# Patient Record
Sex: FEMALE | Race: BLACK OR AFRICAN AMERICAN | NOT HISPANIC OR LATINO | Employment: FULL TIME | URBAN - METROPOLITAN AREA
[De-identification: names, ages, dates, MRNs, and addresses within clinical notes are randomized per-mention and may not be internally consistent; named-entity substitution may affect disease eponyms.]

---
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
## 2019-12-02 ENCOUNTER — OFFICE VISIT (OUTPATIENT)
Dept: FAMILY MEDICINE | Facility: CLINIC | Age: 19
End: 2019-12-02
Payer: COMMERCIAL

## 2019-12-02 VITALS
TEMPERATURE: 97.9 F | WEIGHT: 165.8 LBS | RESPIRATION RATE: 16 BRPM | SYSTOLIC BLOOD PRESSURE: 136 MMHG | HEART RATE: 68 BPM | BODY MASS INDEX: 25.13 KG/M2 | OXYGEN SATURATION: 100 % | DIASTOLIC BLOOD PRESSURE: 81 MMHG | HEIGHT: 68 IN

## 2019-12-02 DIAGNOSIS — N92.6 IRREGULAR PERIODS: ICD-10-CM

## 2019-12-02 DIAGNOSIS — G43.009 MIGRAINE WITHOUT AURA AND WITHOUT STATUS MIGRAINOSUS, NOT INTRACTABLE: ICD-10-CM

## 2019-12-02 DIAGNOSIS — Z00.00 ROUTINE GENERAL MEDICAL EXAMINATION AT A HEALTH CARE FACILITY: Primary | ICD-10-CM

## 2019-12-02 PROCEDURE — 99213 OFFICE O/P EST LOW 20 MIN: CPT | Mod: 25 | Performed by: FAMILY MEDICINE

## 2019-12-02 PROCEDURE — 90472 IMMUNIZATION ADMIN EACH ADD: CPT | Performed by: FAMILY MEDICINE

## 2019-12-02 PROCEDURE — 58301 REMOVE INTRAUTERINE DEVICE: CPT | Performed by: FAMILY MEDICINE

## 2019-12-02 PROCEDURE — 99385 PREV VISIT NEW AGE 18-39: CPT | Mod: 25 | Performed by: FAMILY MEDICINE

## 2019-12-02 PROCEDURE — 90734 MENACWYD/MENACWYCRM VACC IM: CPT | Performed by: FAMILY MEDICINE

## 2019-12-02 PROCEDURE — 90686 IIV4 VACC NO PRSV 0.5 ML IM: CPT | Performed by: FAMILY MEDICINE

## 2019-12-02 PROCEDURE — 90471 IMMUNIZATION ADMIN: CPT | Performed by: FAMILY MEDICINE

## 2019-12-02 RX ORDER — SUMATRIPTAN 50 MG/1
50-100 TABLET, FILM COATED ORAL
Qty: 18 TABLET | Refills: 3 | Status: SHIPPED | OUTPATIENT
Start: 2019-12-02 | End: 2021-02-26

## 2019-12-02 ASSESSMENT — PATIENT HEALTH QUESTIONNAIRE - PHQ9
SUM OF ALL RESPONSES TO PHQ QUESTIONS 1-9: 10
SUM OF ALL RESPONSES TO PHQ QUESTIONS 1-9: 10
10. IF YOU CHECKED OFF ANY PROBLEMS, HOW DIFFICULT HAVE THESE PROBLEMS MADE IT FOR YOU TO DO YOUR WORK, TAKE CARE OF THINGS AT HOME, OR GET ALONG WITH OTHER PEOPLE: SOMEWHAT DIFFICULT

## 2019-12-02 ASSESSMENT — MIFFLIN-ST. JEOR: SCORE: 1576.91

## 2019-12-02 NOTE — PATIENT INSTRUCTIONS
See your dentist for possible bite guard  Try amitriptyline at night to help avoid daily weekly headaches  Use Sumatriptan for rescue  Avoid regular use of rescue medications  See the headache clinic  Eat and drink water regularly and sleep at least 8-9 hours each night  Use your glasses!          Preventive Health Recommendations  Female Ages 18 to 20     Yearly exam:     See your health care provider every year in order to  o Review health changes.   o Discuss preventive care.    o Review your medicines if your doctor has prescribed any.      You should be tested each year for STDs (sexually transmitted diseases).       After age 20, talk to your provider about how often you should have cholesterol testing.      If you are at risk for diabetes, you should have a diabetes test (fasting glucose).     Shots:     Get a flu shot each year.     Get a tetanus shot every 10 years.     Consider getting the shot (vaccine) that prevents cervical cancer (Gardasil).    Nutrition:     Eat at least 5 servings of fruits and vegetables each day.    Eat whole-grain bread, whole-wheat pasta and brown rice instead of white grains and rice.    Get adequate Calcium and Vitamin D.     Lifestyle    Exercise at least 150 minutes a week each week (30 minutes a day, 5 days a week). This will help you control your weight and prevent disease.    No smoking.     Wear sunscreen to prevent skin cancer.    See your dentist every six months for an exam and cleaning.

## 2019-12-02 NOTE — PROGRESS NOTES
SUBJECTIVE:   CC: Maria Alejandra Betts is an 19 year old woman who presents for preventive health visit.     Healthy Habits:     Getting at least 3 servings of Calcium per day:  Yes    Bi-annual eye exam:  Yes    Dental care twice a year:  Yes    Sleep apnea or symptoms of sleep apnea:  None    Diet:  Regular (no restrictions)    Frequency of exercise:  2-3 days/week    Taking medications regularly:  Not Applicable    Barriers to taking medications:  Not applicable    Medication side effects:  Not applicable    PHQ-2 Total Score: 4    Additional concerns today:  No     Answers for HPI/ROS submitted by the patient on 12/2/2019   Annual Exam:  If you checked off any problems, how difficult have these problems made it for you to do your work, take care of things at home, or get along with other people?: Somewhat difficult  PHQ9 TOTAL SCORE: 10          (Z00.00) Routine general medical examination at a health care facility  (primary encounter diagnosis)  Comment:   Plan:     (G43.009) Migraine without aura and without status migrainosus, not intractable  Comment: ongoing headaches dissiculty with near daily ones  Taking a lot of nsaids  Is aware of some triggers  Does feel there is some grinding clenching causing some headaches  Plan: NEUROLOGY ADULT REFERRAL, SUMAtriptan (IMITREX)        50 MG tablet, amitriptyline (ELAVIL) 25 MG         tablet            (N92.6) Irregular periods  Comment: would like IUD removed  Plan: REMOVE INTRAUTERINE DEVICE        Plans to use condoms for now       Today's PHQ-2 Score:   PHQ-2 ( 1999 Pfizer) 12/2/2019   Q1: Little interest or pleasure in doing things 2   Q2: Feeling down, depressed or hopeless 2   PHQ-2 Score 4   Q1: Little interest or pleasure in doing things More than half the days   Q2: Feeling down, depressed or hopeless More than half the days   PHQ-2 Score 4       Abuse: Current or Past(Physical, Sexual or Emotional)- No  Do you feel safe in your environment? Yes        Social  History     Tobacco Use     Smoking status: Not on file   Substance Use Topics     Alcohol use: Not on file     If you drink alcohol do you typically have >3 drinks per day or >7 drinks per week? No    Alcohol Use 12/2/2019   Prescreen: >3 drinks/day or >7 drinks/week? No   No flowsheet data found.    Reviewed orders with patient.  Reviewed health maintenance and updated orders accordingly - Yes  Lab work is in process  Labs reviewed in EPIC  There is no problem list on file for this patient.    No past surgical history on file.    Social History     Tobacco Use     Smoking status: Never Smoker     Smokeless tobacco: Never Used   Substance Use Topics     Alcohol use: Not on file     Family History   Problem Relation Age of Onset     Allergies Mother      Back Pain Mother      Asthma Mother      Heart block Maternal Grandmother      Heart Disease Maternal Grandmother      Allergies Maternal Grandmother      Asthma Maternal Grandmother      Heart Disease Paternal Grandmother      Diabetes Paternal Grandmother      Hypertension Paternal Grandmother      Allergies Sister          Current Outpatient Medications   Medication Sig Dispense Refill     amitriptyline (ELAVIL) 25 MG tablet Take 1 tablet (25 mg) by mouth At Bedtime For avoiding clenching and headache prevention (Patient not taking: Reported on 12/4/2019) 30 tablet 1     SUMAtriptan (IMITREX) 50 MG tablet Take 1-2 tablets ( mg) by mouth at onset of headache for migraine May repeat in 2 hours. Max 4 tablets/24 hours. 18 tablet 3       Mammogram not appropriate for this patient based on age.    Pertinent mammograms are reviewed under the imaging tab.  History of abnormal Pap smear: NO - age 21-29 PAP every 3 years recommended     Reviewed and updated as needed this visit by clinical staff         Reviewed and updated as needed this visit by Provider        No past medical history on file.   No past surgical history on file.    Review of  "Systems  CONSTITUTIONAL: NEGATIVE for fever, chills, change in weight  INTEGUMENTARU/SKIN: NEGATIVE for worrisome rashes, moles or lesions  EYES: NEGATIVE for vision changes or irritation  ENT: NEGATIVE for ear, mouth and throat problems  RESP: NEGATIVE for significant cough or SOB  BREAST: NEGATIVE for masses, tenderness or discharge  CV: NEGATIVE for chest pain, palpitations or peripheral edema  GI: NEGATIVE for nausea, abdominal pain, heartburn, or change in bowel habits  : NEGATIVE for unusual urinary or vaginal symptoms. Periods are regular.  MUSCULOSKELETAL: NEGATIVE for significant arthralgias or myalgia  NEURO: NEGATIVE for weakness, dizziness or paresthesias  PSYCHIATRIC: NEGATIVE for changes in mood or affect     OBJECTIVE:   /81   Pulse 68   Temp 97.9  F (36.6  C) (Oral)   Resp 16   Ht 1.737 m (5' 8.4\")   Wt 75.2 kg (165 lb 12.8 oz)   LMP 11/27/2019   SpO2 100%   BMI 24.92 kg/m    Physical Exam  GENERAL: healthy, alert and no distress  EYES: Eyes grossly normal to inspection, PERRL and conjunctivae and sclerae normal  HENT: ear canals and TM's normal, nose and mouth without ulcers or lesions  NECK: no adenopathy, no asymmetry, masses, or scars and thyroid normal to palpation  RESP: lungs clear to auscultation - no rales, rhonchi or wheezes  CV: regular rate and rhythm, normal S1 S2, no S3 or S4, no murmur, click or rub, no peripheral edema and peripheral pulses strong  ABDOMEN: soft, nontender, no hepatosplenomegaly, no masses and bowel sounds normal  MS: no gross musculoskeletal defects noted, no edema  SKIN: no suspicious lesions or rashes  NEURO: Normal strength and tone, mentation intact and speech normal  PSYCH: mentation appears normal, affect normal/bright  Diagnostic Test Results:  Labs reviewed in Epic    ASSESSMENT/PLAN:   1. Routine general medical examination at a health care facility   See your dentist for possible bite guard  Try amitriptyline at night to help avoid daily " "weekly headaches  Use Sumatriptan for rescue  Avoid regular use of rescue medications  See the headache clinic  Eat and drink water regularly and sleep at least 8-9 hours each night  Use your glasses!      2. Migraine without aura and without status migrainosus, not intractable    - NEUROLOGY ADULT REFERRAL  - SUMAtriptan (IMITREX) 50 MG tablet; Take 1-2 tablets ( mg) by mouth at onset of headache for migraine May repeat in 2 hours. Max 4 tablets/24 hours.  Dispense: 18 tablet; Refill: 3  - amitriptyline (ELAVIL) 25 MG tablet; Take 1 tablet (25 mg) by mouth At Bedtime For avoiding clenching and headache prevention (Patient not taking: Reported on 12/4/2019)  Dispense: 30 tablet; Refill: 1    3. Irregular periods    - REMOVE INTRAUTERINE DEVICE    COUNSELING:  Reviewed preventive health counseling, as reflected in patient instructions       Regular exercise       Healthy diet/nutrition       Safe sex practices/STD prevention    Estimated body mass index is 20.67 kg/m  as calculated from the following:    Height as of 4/27/17: 1.753 m (5' 9\").    Weight as of 4/27/17: 63.5 kg (140 lb).         has no history on file for tobacco.      Counseling Resources:  ATP IV Guidelines  Pooled Cohorts Equation Calculator  Breast Cancer Risk Calculator  FRAX Risk Assessment  ICSI Preventive Guidelines  Dietary Guidelines for Americans, 2010  USDA's MyPlate  ASA Prophylaxis  Lung CA Screening    Kenzie Walker MD  [facility]  "

## 2019-12-02 NOTE — Clinical Note
I asked her to call back to review her symptoms since her last visitPlease let me know if she calls backSavanna

## 2019-12-02 NOTE — NURSING NOTE
"Chief Complaint   Patient presents with     Physical     /81   Pulse 68   Temp 97.9  F (36.6  C) (Oral)   Resp 16   Ht 1.737 m (5' 8.4\")   Wt 75.2 kg (165 lb 12.8 oz)   LMP 11/27/2019   SpO2 100%   BMI 24.92 kg/m   Estimated body mass index is 24.92 kg/m  as calculated from the following:    Height as of this encounter: 1.737 m (5' 8.4\").    Weight as of this encounter: 75.2 kg (165 lb 12.8 oz).  bp completed using cuff size: regular      Health Maintenance addressed:  NONE    n/a    Cassi Piedra MA    "

## 2019-12-04 ENCOUNTER — OFFICE VISIT (OUTPATIENT)
Dept: FAMILY MEDICINE | Facility: CLINIC | Age: 19
End: 2019-12-04
Payer: COMMERCIAL

## 2019-12-04 VITALS — SYSTOLIC BLOOD PRESSURE: 127 MMHG | DIASTOLIC BLOOD PRESSURE: 81 MMHG | TEMPERATURE: 98.9 F

## 2019-12-04 DIAGNOSIS — Z71.84 TRAVEL ADVICE ENCOUNTER: Primary | ICD-10-CM

## 2019-12-04 PROCEDURE — 90472 IMMUNIZATION ADMIN EACH ADD: CPT | Mod: GA | Performed by: NURSE PRACTITIONER

## 2019-12-04 PROCEDURE — 90471 IMMUNIZATION ADMIN: CPT | Mod: GA | Performed by: NURSE PRACTITIONER

## 2019-12-04 PROCEDURE — 90691 TYPHOID VACCINE IM: CPT | Mod: GA | Performed by: NURSE PRACTITIONER

## 2019-12-04 PROCEDURE — 90715 TDAP VACCINE 7 YRS/> IM: CPT | Mod: GA | Performed by: NURSE PRACTITIONER

## 2019-12-04 PROCEDURE — 99402 PREV MED CNSL INDIV APPRX 30: CPT | Mod: 25 | Performed by: NURSE PRACTITIONER

## 2019-12-04 RX ORDER — AZITHROMYCIN 500 MG/1
500 TABLET, FILM COATED ORAL DAILY
Qty: 3 TABLET | Refills: 0 | Status: SHIPPED | OUTPATIENT
Start: 2019-12-04 | End: 2019-12-07

## 2019-12-04 NOTE — NURSING NOTE
"Chief Complaint   Patient presents with     [facility]     initial /81 (BP Location: Left arm, Cuff Size: Adult Regular)   Temp 98.9  F (37.2  C) (Oral)   LMP 11/27/2019  Estimated body mass index is 24.92 kg/m  as calculated from the following:    Height as of 12/2/19: 1.737 m (5' 8.4\").    Weight as of 12/2/19: 75.2 kg (165 lb 12.8 oz).  BP completed using cuff size: regular.  L   arm      Health Maintenance that is potentially due pending provider review:  NONE    n/a    Miles Hoyos ma  "

## 2019-12-04 NOTE — PATIENT INSTRUCTIONS
Today December 4, 2019 you received the    Tetanus (Tdap) Vaccine    Typhoid - injectable. This vaccine is valid for two years.   .    These appointments can be made as a NURSE ONLY visit.    **It is very important for the vaccinations to be given on the scheduled day(s), this helps ensure you receive the full effectiveness of the vaccine.**    Please call [facility] with any questions [phone]    Thank you for visiting [facility]

## 2019-12-04 NOTE — PROGRESS NOTES
Nurse Note      Itinerary:  [place]      Departure Date: 1/10/20      Return Date: 1/25/20      Length of Trip 2 weeks      Reason for Travel: Study abroad           Urban or rural: both      Accommodations: Private dwelling        IMMUNIZATION HISTORY  Have you received any immunizations within the past 4 weeks?  No  Have you ever fainted from having your blood drawn or from an injection?  No  Have you ever had a fever reaction to vaccination?  No  Have you ever had any bad reaction or side effect from any vaccination?  No  Have you ever had hepatitis A or B vaccine?  Yes  Do you live (or work closely) with anyone who has AIDS, an AIDS-like condition, any other immune disorder or who is on chemotherapy for cancer?  No  Do you have a family history of immunodeficiency?  No  Have you received any injection of immune globulin or any blood products during the past 12 months?  No    Patient roomed by Miles Pop  Maria Alejandra Betts is a 19 year old female seen today with friends  for counsultation for international travel to the stated countries.   Patient will be departing in  5weeks    Patient itinerary :  will be in the [facility] [place] which presents risk for Dengue Fever and food borne illnesses. exposure.      Patient's activities will include vision trip with contact with animals.    Patient's country of birth is [place]    Special medical concerns: migrains  Pre-travel questionnaire was completed by patient and reviewed by provider.     Vitals: /81 (BP Location: Left arm, Cuff Size: Adult Regular)   Temp 98.9  F (37.2  C) (Oral)   LMP 11/27/2019   BMI= There is no height or weight on file to calculate BMI.    EXAM:  General:  Well-nourished, well-developed in no acute distress.  Appears to be stated age, interacts appropriately and expresses understanding of information given to patient.    Current Outpatient Medications   Medication Sig Dispense Refill     SUMAtriptan  (IMITREX) 50 MG tablet Take 1-2 tablets ( mg) by mouth at onset of headache for migraine May repeat in 2 hours. Max 4 tablets/24 hours. 18 tablet 3     amitriptyline (ELAVIL) 25 MG tablet Take 1 tablet (25 mg) by mouth At Bedtime For avoiding clenching and headache prevention (Patient not taking: Reported on 12/4/2019) 30 tablet 1     There is no problem list on file for this patient.    Allergies   Allergen Reactions     Seasonal Allergies          Immunizations discussed include:   Hepatitis A:  Up to date  Hepatitis B: Up to date  Influenza: Up to date  Typhoid: Ordered/given today, risks, benefits and side effects reviewed  Rabies: Declined  Not concerned about risk of disease  Patient states that school contacted the facility and they state that animals are vaccinated  Yellow Fever: Not indicated  Japanese Encephalitis: Not indicated  Meningococcus: Up to date  Tetanus/Diphtheria: Ordered/given today, risks, benefits and side effects reviewed  Measles/Mumps/Rubella: Up to date  Cholera: Not needed  Polio: Up to date  Pneumococcal: Under age of 65  Varicella: Up to date  Zostavax:  Not indicated  Shingrix: Not indicated  HPV:  deferred  TB:  na    Altitude Exposure on this trip: no  Past tolerance to Altitude: na    ASSESSMENT/PLAN:    ICD-10-CM    1. Travel advice encounter Z71.84 azithromycin (ZITHROMAX) 500 MG tablet     I have reviewed general recommendations for safe travel   including: food/water precautions, insect precautions, safer sex   practices given high prevalence of Zika, HIV and other STDs,   roadway safety. Educational materials and [employer] report provided.    Malaraia prophylaxis recommended: none  Symptomatic treatment for traveler's diarrhea: azithromycin  Altitude illness prevention and treatment: na      Evacuation insurance advised and resources were provided to patient.    Total visit time 30 minutes  with over 50% of time spent counseling patient as detailed above.    Luz Maria Chilel  CNP

## 2019-12-12 ENCOUNTER — TELEPHONE (OUTPATIENT)
Dept: FAMILY MEDICINE | Facility: CLINIC | Age: 19
End: 2019-12-12

## 2019-12-12 NOTE — TELEPHONE ENCOUNTER
Message from SN in CC'd chart:    Kenzie Walker MD  [facility] T[facility]             I asked her to call back to review her symptoms since her last visit     Please let me know if she calls back   Thanks     SN

## 2021-03-26 ENCOUNTER — OFFICE VISIT (OUTPATIENT)
Dept: URGENT CARE | Facility: URGENT CARE | Age: 21
End: 2021-03-26
Payer: COMMERCIAL

## 2021-03-26 ENCOUNTER — TELEPHONE (OUTPATIENT)
Dept: FAMILY MEDICINE | Facility: CLINIC | Age: 21
End: 2021-03-26

## 2021-03-26 VITALS
DIASTOLIC BLOOD PRESSURE: 79 MMHG | WEIGHT: 160 LBS | BODY MASS INDEX: 24.04 KG/M2 | OXYGEN SATURATION: 100 % | HEART RATE: 73 BPM | SYSTOLIC BLOOD PRESSURE: 118 MMHG | TEMPERATURE: 97.7 F

## 2021-03-26 DIAGNOSIS — H60.501 ACUTE OTITIS EXTERNA OF RIGHT EAR, UNSPECIFIED TYPE: Primary | ICD-10-CM

## 2021-03-26 PROCEDURE — 99213 OFFICE O/P EST LOW 20 MIN: CPT | Performed by: FAMILY MEDICINE

## 2021-03-26 RX ORDER — CITALOPRAM HYDROBROMIDE 20 MG/1
TABLET ORAL
COMMUNITY
Start: 2020-12-28 | End: 2021-11-05

## 2021-03-26 RX ORDER — CIPROFLOXACIN AND DEXAMETHASONE 3; 1 MG/ML; MG/ML
4 SUSPENSION/ DROPS AURICULAR (OTIC) 2 TIMES DAILY
Qty: 2.8 ML | Refills: 0 | Status: SHIPPED | OUTPATIENT
Start: 2021-03-26 | End: 2021-04-02

## 2021-03-26 NOTE — TELEPHONE ENCOUNTER
Patient states she has intermittent tingly sensation to right ear  Had concussion to right side of head a couple years ago   Can hear out of ear just fine   Uses Qtips in ears - tried to clean ears last night - that didn't help   No respiratory s/s  No fever   Has headache as well   Offered appt - pt works until 6pm  Advised TI CARMICHAEL RN

## 2021-03-27 NOTE — PATIENT INSTRUCTIONS
Patient Education     External Ear Infection (Adult)    External otitis (also called  swimmer s ear ) is an infection in the ear canal. It's often caused by bacteria or fungus. It can occur a few days after water gets trapped in the ear canal (from swimming or bathing). It can also occur after cleaning too deeply in the ear canal with a cotton swab or other object. Sometimes, hair care products get into the ear canal and cause this problem.   Symptoms can include pain, fever, itching, redness, drainage, or swelling of the ear canal. Temporary hearing loss may also occur.   Home care    Don't try to clean the ear canal. This can push pus and bacteria deeper into the canal.    Use prescribed ear drops as directed. These help reduce swelling and fight the infection. If an ear wick was placed in the ear canal, apply drops right onto the end of the wick. The wick will draw the medicine into the ear canal even if it's swollen closed.    A cotton ball may be loosely placed in the outer ear to absorb any drainage.    You may use over-the-counter medicines to control pain as directed by the healthcare provider, unless another medicine was prescribed. Talk with your provider before using these medicines if you have chronic liver or kidney disease or ever had a stomach ulcer or digestive tract bleeding.    Don't allow water to get into your ear when bathing. Also don't swim until the infection has cleared.    Prevention    Keep your ears dry. This helps lower the risk of infection. Dry your ears with a towel or hair dryer after getting wet. Also, use ear plugs when swimming.    Don't stick any objects in the ear to remove wax.    Talk with your provider about using ear drops to prevent swimmer's ear in case you feel water trapped in your ear canal. You can get these drops over the counter at most drugstores. They work by removing water from the ear canal.    Follow-up care  Follow up with your healthcare provider in 1 week,  or as advised.   When to seek medical advice  Call your healthcare provider right away if any of these occur:     Ear pain becomes worse or doesn t improve after 3 days of treatment    Redness or swelling of the outer ear occurs or gets worse    Headache    Fever of 100.4 F (38 C) or higher, or as directed by your healthcare provider  Call 911  Call 911 or get immediate medical care if any of the following occur:     Seizure    Unusual drowsiness or confusion    Unusual painful or stiff neck    Radha last reviewed this educational content on 8/1/2020 2000-2020 [employer]. All rights reserved. This information is not intended as a substitute for professional medical care. Always follow your healthcare professional's instructions.

## 2021-03-28 NOTE — PROGRESS NOTES
SUBJECTIVE:  Maria Alejandra Betts, a 21 year old female scheduled an appointment to discuss the following issues:  Acute otitis externa of right ear, unspecified type    Medical, social, surgical, and family histories reviewed.     Urgent Care   Ear Problem (c/o ear pain for 1 day)  Pt felt right ear tingling and irritated for the last 1-2 days.  No drainage or actual pain.  Mild headache.  No hx of ear infection.  No eye or sinus symptoms.  Slight runny nose this week.  No sore throat.  No COVID-19 exposure; no loss of sense of smell or taste.     ROS:  See HPI.  No nausea/vomiting.  No fever/chills.  No chest pain/SOB.  No BM/urine problems.  Mild lightheadedness but no syncope.      OBJECTIVE:  /79   Pulse 73   Temp 97.7  F (36.5  C) (Tympanic)   Wt 72.6 kg (160 lb)   LMP 03/12/2021   SpO2 100%   BMI 24.04 kg/m    EXAM:  GENERAL APPEARANCE: alert and midl distress; afebrile; no cyanosis or accessory muscle use, no meningeal signs  EYES: Eyes grossly normal to inspection, PERRL and conjunctivae and sclerae normal  HENT: left ear canal and TM normal; right ear canal showed waxy debris and erythema at inferior aspect of the canal wall; normal right TM; nose and mouth without ulcers or lesions; no sinus or mastoid tenderness  NECK: no adenopathy, no asymmetry, masses, or scars and neck normal to palpation  RESP: lungs clear to auscultation - no rales, rhonchi or wheezes  CV: regular rates and rhythm, normal S1 S2, no S3 or S4 and no murmur, click or rub  LYMPHATICS: no cervical, preauricular or post auricular adenopathy  MS: extremities normal- no gross deformities noted  SKIN: no suspicious lesions or rashes  NEURO: Normal strength and tone, mentation intact and speech normal      ASSESSMENT/PLAN:  (H60.501) Acute otitis externa of right ear, unspecified type  (primary encounter diagnosis)  Comment: right ear irrigation attempted but pt did not tolerate it; small amount of wet waxy debris removed with curette;  no bleeding  Plan: ciprofloxacin-dexamethasone (CIPRODEX) 0.3-0.1         % otic suspension  Care instructions given; avoid Q-tips.     Pt to f/up PCP within 1 week if no improvement or worsening.  Warning signs and symptoms explained.

## 2021-07-08 NOTE — PROGRESS NOTES
SUBJECTIVE:   CC: Maria Alejandra Betts is an 21 year old woman who presents for preventive health visit.       Patient has been advised of split billing requirements and indicates understanding: Yes  Healthy Habits:     Getting at least 3 servings of Calcium per day:  NO    Bi-annual eye exam:  NO    Dental care twice a year:  Yes    Sleep apnea or symptoms of sleep apnea:  None    Diet:  Regular (no restrictions)    Frequency of exercise:  None    Taking medications regularly:  Yes    Medication side effects:  None    PHQ-2 Total Score: 0    Additional concerns today:  No      (Z00.00) Routine general medical examination at a health care facility  (primary encounter diagnosis)  Comment:   Plan: NEISSERIA GONORRHOEA PCR, CHLAMYDIA TRACHOMATIS        PCR            (G43.009) Migraine without aura and without status migrainosus, not intractable  Comment:   Plan: SUMAtriptan (IMITREX) 50 MG tablet            (Z12.4) Cervical cancer screening  Comment:   Plan: CANCELED: Pap imaged thin layer screen with HPV        - recommended age 30 - 65 years (select HPV         order below)            (Z12.4) Screening for cervical cancer  Comment:   Plan: Pap imaged thin layer screen only - recommended        age 21 - 24 years            (G43.109) Migraine with aura and without status migrainosus, not intractable  Comment:   Plan:     (R55) Syncope, unspecified syncope type  Comment:   Plan: EKG 12-lead complete w/read - Clinics, TSH with        free T4 reflex, Comprehensive metabolic panel         (BMP + Alb, Alk Phos, ALT, AST, Total. Bili,         TP), CBC with platelets               Answers for HPI/ROS submitted by the patient on 7/12/2021  If you checked off any problems, how difficult have these problems made it for you to do your work, take care of things at home, or get along with other people?: Not difficult at all  PHQ9 TOTAL SCORE: 1        Today's PHQ-2 Score:   PHQ-2 ( 1999 Pf[employer]) 12/2/2019   Q1: Little interest or  pleasure in doing things 2   Q2: Feeling down, depressed or hopeless 2   PHQ-2 Score 4   Q1: Little interest or pleasure in doing things More than half the days   Q2: Feeling down, depressed or hopeless More than half the days   PHQ-2 Score 4       Abuse: Current or Past (Physical, Sexual or Emotional) - No  Do you feel safe in your environment? Yes    Have you ever done Advance Care Planning? (For example, a Health Directive, POLST, or a discussion with a medical provider or your loved ones about your wishes): No, advance care planning information given to patient to review.  Advanced care planning was discussed at today's visit.    Social History     Tobacco Use     Smoking status: Never Smoker     Smokeless tobacco: Never Used   Substance Use Topics     Alcohol use: Not on file     If you drink alcohol do you typically have >3 drinks per day or >7 drinks per week? No    Alcohol Use 12/2/2019   Prescreen: >3 drinks/day or >7 drinks/week? No   Prescreen: >3 drinks/day or >7 drinks/week? -   No flowsheet data found.    Reviewed orders with patient.  Reviewed health maintenance and updated orders accordingly - Yes  Lab work is in process    Breast Cancer Screening:        History of abnormal Pap smear: NO - age 21-29 PAP every 3 years recommended     Reviewed and updated as needed this visit by clinical staff                 Reviewed and updated as needed this visit by Provider                No past medical history on file.   No past surgical history on file.    Review of Systems   Constitutional: Negative for chills and fever.   HENT: Negative for congestion, ear pain, hearing loss and sore throat.    Eyes: Negative for pain and visual disturbance.   Respiratory: Negative for cough and shortness of breath.    Cardiovascular: Negative for chest pain, palpitations and peripheral edema.   Gastrointestinal: Negative for abdominal pain, constipation, diarrhea, heartburn, hematochezia and nausea.   Genitourinary:  Negative for dysuria, frequency, genital sores, hematuria and urgency.   Musculoskeletal: Negative for arthralgias, joint swelling and myalgias.   Skin: Negative for rash.   Neurological: Positive for headaches. Negative for dizziness, weakness and paresthesias.   Psychiatric/Behavioral: Negative for mood changes. The patient is not nervous/anxious.           OBJECTIVE:   There were no vitals taken for this visit.  Physical Exam  GENERAL: healthy, alert and no distress  EYES: Eyes grossly normal to inspection, PERRL and conjunctivae and sclerae normal  HENT: ear canals and TM's normal, nose and mouth without ulcers or lesions  NECK: no adenopathy, no asymmetry, masses, or scars and thyroid normal to palpation  RESP: lungs clear to auscultation - no rales, rhonchi or wheezes  BREAST: normal without masses, tenderness or nipple discharge and no palpable axillary masses or adenopathy  CV: regular rate and rhythm, normal S1 S2, no S3 or S4, no murmur, click or rub, no peripheral edema and peripheral pulses strong  ABDOMEN: soft, nontender, no hepatosplenomegaly, no masses and bowel sounds normal  MS: no gross musculoskeletal defects noted, no edema  SKIN: no suspicious lesions or rashes  NEURO: Normal strength and tone, mentation intact and speech normal  PSYCH: mentation appears normal, affect normal/bright    Diagnostic Test Results:  Labs reviewed in Epic    ASSESSMENT/PLAN:   1. Routine general medical examination at a health care facility  See avs  - NEISSERIA GONORRHOEA PCR  - CHLAMYDIA TRACHOMATIS PCR    2. Migraine without aura and without status migrainosus, not intractable  reviewed triggers and control measures  discussed meds for rescue and keeping headache diary  - SUMAtriptan (IMITREX) 50 MG tablet; TAKE 1-2 TABLETS BY MOUTH AT ONSET OF HEADACHE FOR MIGRAINE MAY REPEAT IN 2 HOURS. MAX 4 TABS/24 HOURS.  Dispense: 18 tablet; Refill: 11    3. Screening for cervical cancer    - Pap imaged thin layer screen  "only - recommended age 21 - 24 years    4. Syncope, unspecified syncope type   EKG showed no prolonged QT or P wave abnormality  Additional eval as noted below  ECHO ordered  - EKG 12-lead complete w/read - Clinics  - TSH with free T4 reflex; Future  - Comprehensive metabolic panel (BMP + Alb, Alk Phos, ALT, AST, Total. Bili, TP); Future  - CBC with platelets; Future  - CBC with platelets  - Comprehensive metabolic panel (BMP + Alb, Alk Phos, ALT, AST, Total. Bili, TP)  - TSH with free T4 reflex    Patient has been advised of split billing requirements and indicates understanding: Yes  COUNSELING:  Reviewed preventive health counseling, as reflected in patient instructions       Regular exercise       Healthy diet/nutrition    Estimated body mass index is 24.04 kg/m  as calculated from the following:    Height as of 12/2/19: 1.737 m (5' 8.4\").    Weight as of 3/26/21: 72.6 kg (160 lb).  In addition to the preventive visit, 25 minutes was spent face to face with (>50%) counseling and/or coordination of care regarding addition concerns and symptoms.      She reports that she has never smoked. She has never used smokeless tobacco.      Counseling Resources:  ATP IV Guidelines  Pooled Cohorts Equation Calculator  Breast Cancer Risk Calculator  BRCA-Related Cancer Risk Assessment: FHS-7 Tool  FRAX Risk Assessment  ICSI Preventive Guidelines  Dietary Guidelines for Americans, 2010  USDA's MyPlate  ASA Prophylaxis  Lung CA Screening    Kenzie Walker MD  [facility]  "

## 2021-07-12 ENCOUNTER — OFFICE VISIT (OUTPATIENT)
Dept: FAMILY MEDICINE | Facility: CLINIC | Age: 21
End: 2021-07-12
Payer: COMMERCIAL

## 2021-07-12 VITALS
HEIGHT: 68 IN | TEMPERATURE: 97.3 F | SYSTOLIC BLOOD PRESSURE: 127 MMHG | DIASTOLIC BLOOD PRESSURE: 79 MMHG | RESPIRATION RATE: 16 BRPM | HEART RATE: 69 BPM | BODY MASS INDEX: 23.19 KG/M2 | OXYGEN SATURATION: 100 % | WEIGHT: 153 LBS

## 2021-07-12 DIAGNOSIS — Z12.4 CERVICAL CANCER SCREENING: ICD-10-CM

## 2021-07-12 DIAGNOSIS — R55 SYNCOPE, UNSPECIFIED SYNCOPE TYPE: ICD-10-CM

## 2021-07-12 DIAGNOSIS — G43.009 MIGRAINE WITHOUT AURA AND WITHOUT STATUS MIGRAINOSUS, NOT INTRACTABLE: ICD-10-CM

## 2021-07-12 DIAGNOSIS — Z00.00 ROUTINE GENERAL MEDICAL EXAMINATION AT A HEALTH CARE FACILITY: Primary | ICD-10-CM

## 2021-07-12 PROBLEM — G43.109 MIGRAINE WITH AURA AND WITHOUT STATUS MIGRAINOSUS, NOT INTRACTABLE: Status: ACTIVE | Noted: 2021-07-12

## 2021-07-12 LAB
ALBUMIN SERPL-MCNC: 4 G/DL (ref 3.4–5)
ALP SERPL-CCNC: 89 U/L (ref 40–150)
ALT SERPL W P-5'-P-CCNC: 34 U/L (ref 0–50)
ANION GAP SERPL CALCULATED.3IONS-SCNC: 3 MMOL/L (ref 3–14)
AST SERPL W P-5'-P-CCNC: 20 U/L (ref 0–45)
BILIRUB SERPL-MCNC: 0.3 MG/DL (ref 0.2–1.3)
BUN SERPL-MCNC: 9 MG/DL (ref 7–30)
CALCIUM SERPL-MCNC: 8.7 MG/DL (ref 8.5–10.1)
CHLORIDE BLD-SCNC: 108 MMOL/L (ref 94–109)
CO2 SERPL-SCNC: 27 MMOL/L (ref 20–32)
CREAT SERPL-MCNC: 0.76 MG/DL (ref 0.52–1.04)
ERYTHROCYTE [DISTWIDTH] IN BLOOD BY AUTOMATED COUNT: 13.6 % (ref 10–15)
GFR SERPL CREATININE-BSD FRML MDRD: >90 ML/MIN/1.73M2
GLUCOSE BLD-MCNC: 85 MG/DL (ref 70–99)
HCT VFR BLD AUTO: 42.1 % (ref 35–47)
HGB BLD-MCNC: 13.3 G/DL (ref 11.7–15.7)
MCH RBC QN AUTO: 27.9 PG (ref 26.5–33)
MCHC RBC AUTO-ENTMCNC: 31.6 G/DL (ref 31.5–36.5)
MCV RBC AUTO: 88 FL (ref 78–100)
PLATELET # BLD AUTO: 360 10E3/UL (ref 150–450)
POTASSIUM BLD-SCNC: 4.3 MMOL/L (ref 3.4–5.3)
PROT SERPL-MCNC: 7.2 G/DL (ref 6.8–8.8)
RBC # BLD AUTO: 4.77 10E6/UL (ref 3.8–5.2)
SODIUM SERPL-SCNC: 138 MMOL/L (ref 133–144)
TSH SERPL DL<=0.005 MIU/L-ACNC: 0.42 MU/L (ref 0.4–4)
WBC # BLD AUTO: 5.4 10E3/UL (ref 4–11)

## 2021-07-12 PROCEDURE — G0145 SCR C/V CYTO,THINLAYER,RESCR: HCPCS | Performed by: FAMILY MEDICINE

## 2021-07-12 PROCEDURE — 87491 CHLMYD TRACH DNA AMP PROBE: CPT | Performed by: FAMILY MEDICINE

## 2021-07-12 PROCEDURE — 36415 COLL VENOUS BLD VENIPUNCTURE: CPT | Performed by: FAMILY MEDICINE

## 2021-07-12 PROCEDURE — 84443 ASSAY THYROID STIM HORMONE: CPT | Performed by: FAMILY MEDICINE

## 2021-07-12 PROCEDURE — 93000 ELECTROCARDIOGRAM COMPLETE: CPT | Performed by: FAMILY MEDICINE

## 2021-07-12 PROCEDURE — 99214 OFFICE O/P EST MOD 30 MIN: CPT | Mod: 25 | Performed by: FAMILY MEDICINE

## 2021-07-12 PROCEDURE — 87591 N.GONORRHOEAE DNA AMP PROB: CPT | Performed by: FAMILY MEDICINE

## 2021-07-12 PROCEDURE — 85027 COMPLETE CBC AUTOMATED: CPT | Performed by: FAMILY MEDICINE

## 2021-07-12 PROCEDURE — 99395 PREV VISIT EST AGE 18-39: CPT | Performed by: FAMILY MEDICINE

## 2021-07-12 PROCEDURE — 80053 COMPREHEN METABOLIC PANEL: CPT | Performed by: FAMILY MEDICINE

## 2021-07-12 RX ORDER — SUMATRIPTAN 50 MG/1
TABLET, FILM COATED ORAL
Qty: 18 TABLET | Refills: 11 | Status: SHIPPED | OUTPATIENT
Start: 2021-07-12 | End: 2023-01-26

## 2021-07-12 ASSESSMENT — ENCOUNTER SYMPTOMS
CONSTIPATION: 0
PALPITATIONS: 0
NAUSEA: 0
DIZZINESS: 0
SORE THROAT: 0
HEARTBURN: 0
JOINT SWELLING: 0
PARESTHESIAS: 0
NERVOUS/ANXIOUS: 0
FREQUENCY: 0
FEVER: 0
EYE PAIN: 0
ARTHRALGIAS: 0
COUGH: 0
MYALGIAS: 0
ABDOMINAL PAIN: 0
CHILLS: 0
WEAKNESS: 0
DIARRHEA: 0
SHORTNESS OF BREATH: 0
HEADACHES: 1
HEMATURIA: 0
HEMATOCHEZIA: 0
DYSURIA: 0

## 2021-07-12 ASSESSMENT — PATIENT HEALTH QUESTIONNAIRE - PHQ9
SUM OF ALL RESPONSES TO PHQ QUESTIONS 1-9: 1
10. IF YOU CHECKED OFF ANY PROBLEMS, HOW DIFFICULT HAVE THESE PROBLEMS MADE IT FOR YOU TO DO YOUR WORK, TAKE CARE OF THINGS AT HOME, OR GET ALONG WITH OTHER PEOPLE: NOT DIFFICULT AT ALL
SUM OF ALL RESPONSES TO PHQ QUESTIONS 1-9: 1

## 2021-07-12 ASSESSMENT — MIFFLIN-ST. JEOR: SCORE: 1502.5

## 2021-07-12 NOTE — LETTER
July 20, 2021      Maria Alejandra Betts  [address] [place] [id number]        Dear ,    We are writing to inform you of your test results.    Your test results fall within the expected range(s) or remain unchanged from previous results.  Please continue with current treatment plan.    Resulted Orders   NEISSERIA GONORRHOEA PCR   Result Value Ref Range    Neisseria gonorrhoeae Negative Negative      Comment:      Negative for N. gonorrhoeae rRNA by transcription mediated amplification. A negative result by transcription mediated amplification does not preclude the presence of C. trachomatis infection because results are dependent on proper and adequate collection, absence of inhibitors and sufficient rRNA to be detected.    Narrative    The method performance specifications of this test have not been established or approved by the FDA for this specimen type. The test result must be integrated into clinical context for interpretation. This lab is regulated under CLIA as qualified to perform high-complexity clinical testing.   CHLAMYDIA TRACHOMATIS PCR   Result Value Ref Range    Chlamydia trachomatis Negative Negative    Narrative    The method performance specifications of this test have not been established or approved by the FDA for this specimen type. The test result must be integrated into clinical context for interpretation. This lab is regulated under CLIA as qualified to perform high-complexity clinical testing.   CBC with platelets   Result Value Ref Range    WBC Count 5.4 4.0 - 11.0 10e3/uL    RBC Count 4.77 3.80 - 5.20 10e6/uL    Hemoglobin 13.3 11.7 - 15.7 g/dL    Hematocrit 42.1 35.0 - 47.0 %    MCV 88 78 - 100 fL    MCH 27.9 26.5 - 33.0 pg    MCHC 31.6 31.5 - 36.5 g/dL    RDW 13.6 10.0 - 15.0 %    Platelet Count 360 150 - 450 10e3/uL   Comprehensive metabolic panel (BMP + Alb, Alk Phos, ALT, AST, Total. Bili, TP)   Result Value Ref Range    Sodium 138 133 - 144 mmol/L    Potassium 4.3 3.4 -  5.3 mmol/L    Chloride 108 94 - 109 mmol/L    Carbon Dioxide (CO2) 27 20 - 32 mmol/L    Anion Gap 3 3 - 14 mmol/L    Urea Nitrogen 9 7 - 30 mg/dL    Creatinine 0.76 0.52 - 1.04 mg/dL    Calcium 8.7 8.5 - 10.1 mg/dL    Glucose 85 70 - 99 mg/dL    Alkaline Phosphatase 89 40 - 150 U/L    AST 20 0 - 45 U/L    ALT 34 0 - 50 U/L    Protein Total 7.2 6.8 - 8.8 g/dL    Albumin 4.0 3.4 - 5.0 g/dL    Bilirubin Total 0.3 0.2 - 1.3 mg/dL    GFR Estimate >90 >60 mL/min/1.73m2      Comment:      As of July 11, 2021, eGFR is calculated by the CKD-EPI creatinine equation, without race adjustment. eGFR can be influenced by muscle mass, exercise, and diet. The reported eGFR is an estimation only and is only applicable if the renal function is stable.   TSH with free T4 reflex   Result Value Ref Range    TSH 0.42 0.40 - 4.00 mU/L       If you have any questions or concerns, please call the clinic at the number listed above.       Sincerely,      Kenzie Walker MD/ms

## 2021-07-13 LAB
C TRACH DNA SPEC QL NAA+PROBE: NEGATIVE
N GONORRHOEA DNA SPEC QL NAA+PROBE: NEGATIVE

## 2021-07-15 LAB
BKR LAB AP GYN ADEQUACY: NORMAL
BKR LAB AP GYN INTERPRETATION: NORMAL
BKR LAB AP HPV REFLEX: NO
BKR LAB AP LMP: NORMAL
BKR LAB AP PREVIOUS ABNORMAL: NORMAL
PATH REPORT.COMMENTS IMP SPEC: NORMAL
PATH REPORT.COMMENTS IMP SPEC: NORMAL
PATH REPORT.RELEVANT HX SPEC: NORMAL

## 2021-07-25 PROBLEM — G43.009 MIGRAINE WITHOUT AURA AND WITHOUT STATUS MIGRAINOSUS, NOT INTRACTABLE: Status: ACTIVE | Noted: 2021-07-25

## 2021-07-26 ENCOUNTER — TELEPHONE (OUTPATIENT)
Dept: FAMILY MEDICINE | Facility: CLINIC | Age: 21
End: 2021-07-26

## 2021-07-26 NOTE — TELEPHONE ENCOUNTER
----- Message from Kenzie Walker MD sent at 7/25/2021  6:54 PM CDT -----  Regarding: needs follow up  Please let her know I ordered an ECHO to evaluate the fainting episodes she had.  Please give her the number to set this up    Thanks    SN

## 2021-07-26 NOTE — TELEPHONE ENCOUNTER
Left message for pt to call.    She can call [facility] [phone] to get Echo scheduled.    Guadalupe Douglas RN

## 2021-11-05 DIAGNOSIS — F32.A DEPRESSION, UNSPECIFIED DEPRESSION TYPE: Primary | ICD-10-CM

## 2021-11-05 NOTE — TELEPHONE ENCOUNTER
Reason for Call:  Medication or medication refill:    Do you use a [facility]?  Name of the pharmacy and phone number for the current request:  [facility] [address] [facility]     Name of the medication requested: citalopram (CELEXA) 20 MG tablet    Other request: NA    Can we leave a detailed message on this number? YES    Phone number patient can be reached at: Home number on file [phone] (home)    Best Time: any    Call taken on 11/5/2021 at 2:37 PM by Li Serrato

## 2021-11-08 RX ORDER — CITALOPRAM HYDROBROMIDE 20 MG/1
TABLET ORAL
Qty: 90 TABLET | Refills: 3 | Status: SHIPPED | OUTPATIENT
Start: 2021-11-08 | End: 2023-01-26

## 2021-11-08 NOTE — TELEPHONE ENCOUNTER
Routing refill request to provider for review/approval because:  Medication is reported/historical  Ngoc CARMICHAEL RN

## 2022-01-31 ENCOUNTER — HOSPITAL ENCOUNTER (EMERGENCY)
Facility: CLINIC | Age: 22
Discharge: HOME | End: 2022-01-31
Attending: PSYCHIATRY & NEUROLOGY | Admitting: PSYCHIATRY & NEUROLOGY
Payer: COMMERCIAL

## 2022-01-31 VITALS
OXYGEN SATURATION: 100 % | TEMPERATURE: 99.1 F | RESPIRATION RATE: 16 BRPM | DIASTOLIC BLOOD PRESSURE: 77 MMHG | SYSTOLIC BLOOD PRESSURE: 122 MMHG | HEART RATE: 55 BPM

## 2022-01-31 DIAGNOSIS — F43.25 ADJUSTMENT DISORDER WITH MIXED DISTURBANCE OF EMOTIONS AND CONDUCT: ICD-10-CM

## 2022-01-31 DIAGNOSIS — Z72.89 SELF-INJURIOUS BEHAVIOR: ICD-10-CM

## 2022-01-31 LAB
AMPHETAMINES UR QL SCN: ABNORMAL
BARBITURATES UR QL: ABNORMAL
BENZODIAZ UR QL: ABNORMAL
CANNABINOIDS UR QL SCN: ABNORMAL
COCAINE UR QL: ABNORMAL
HCG UR QL: NEGATIVE
OPIATES UR QL SCN: ABNORMAL

## 2022-01-31 PROCEDURE — 90791 PSYCH DIAGNOSTIC EVALUATION: CPT

## 2022-01-31 PROCEDURE — 99284 EMERGENCY DEPT VISIT MOD MDM: CPT | Performed by: PSYCHIATRY & NEUROLOGY

## 2022-01-31 PROCEDURE — 81025 URINE PREGNANCY TEST: CPT | Performed by: PSYCHIATRY & NEUROLOGY

## 2022-01-31 PROCEDURE — 99285 EMERGENCY DEPT VISIT HI MDM: CPT | Mod: 25 | Performed by: PSYCHIATRY & NEUROLOGY

## 2022-01-31 PROCEDURE — 80307 DRUG TEST PRSMV CHEM ANLYZR: CPT | Performed by: PSYCHIATRY & NEUROLOGY

## 2022-01-31 ASSESSMENT — ENCOUNTER SYMPTOMS
HALLUCINATIONS: 0
CONSTITUTIONAL NEGATIVE: 1
MUSCULOSKELETAL NEGATIVE: 1
DECREASED CONCENTRATION: 1
NEUROLOGICAL NEGATIVE: 1
ENDOCRINE NEGATIVE: 1
HYPERACTIVE: 0
RESPIRATORY NEGATIVE: 1
GASTROINTESTINAL NEGATIVE: 1
CARDIOVASCULAR NEGATIVE: 1
EYES NEGATIVE: 1

## 2022-02-01 NOTE — ED PROVIDER NOTES
ED Provider Note  [facility]      History     Chief Complaint   Patient presents with     Suicidal     increasing SI over the past 3 days, reports intrusive thoughts but does not want to act on them     HPI  Maria Alejandra Betts is a 21 year old female who is here accompanied by partner due to feeling overwhelmed and stress in her life. Patient has been more irritable and tends to injure herself by hitting her face and head. She has been leaving bruises. She currently is studying at [employer] and sees a therapist there. She is prescribed citalopram which she is taking. She has no history of psychiatric hospitalization. She admits to passive SI but has been too stressed to feel she can be in control of herself. She did not feel she can be safe being in the community. She is open to being admitted. Patient denies acute medical concerns. She has been vaccinated. She admits to occasional alcohol and THC use.    Please see DEC Crisis Assessment on 1/31/22 in Epic for further details.    PERSONAL MEDICAL HISTORY  History reviewed. No pertinent past medical history.  PAST SURGICAL HISTORY  History reviewed. No pertinent surgical history.  FAMILY HISTORY  Family History   Problem Relation Age of Onset     Allergies Mother      Back Pain Mother      Asthma Mother      Heart block Maternal Grandmother      Heart Disease Maternal Grandmother      Allergies Maternal Grandmother      Asthma Maternal Grandmother      Heart Disease Paternal Grandmother      Diabetes Paternal Grandmother      Hypertension Paternal Grandmother      Allergies Sister      SOCIAL HISTORY  Social History     Tobacco Use     Smoking status: Current Every Day Smoker     Types: Vaping Device     Smokeless tobacco: Never Used   Substance Use Topics     Alcohol use: Yes     Comment: rarely     MEDICATIONS  No current facility-administered medications for this encounter.     Current Outpatient Medications   Medication      citalopram (CELEXA) 20 MG tablet     SUMAtriptan (IMITREX) 50 MG tablet     ALLERGIES  Allergies   Allergen Reactions     Seasonal Allergies           Review of Systems   Constitutional: Negative.    HENT: Negative.    Eyes: Negative.    Respiratory: Negative.    Cardiovascular: Negative.    Gastrointestinal: Negative.    Endocrine: Negative.    Genitourinary: Negative.    Musculoskeletal: Negative.    Skin: Negative.    Neurological: Negative.    Psychiatric/Behavioral: Positive for decreased concentration, self-injury and suicidal ideas. Negative for hallucinations. The patient is not hyperactive.    All other systems reviewed and are negative.        Physical Exam   BP: 126/86  Pulse: 74  Temp: 98.6  F (37  C)  Resp: 14  SpO2: 95 %  Physical Exam  Vitals and nursing note reviewed.   HENT:      Head: Normocephalic.   Eyes:      Pupils: Pupils are equal, round, and reactive to light.   Pulmonary:      Effort: Pulmonary effort is normal.   Musculoskeletal:         General: Normal range of motion.      Cervical back: Normal range of motion.   Neurological:      General: No focal deficit present.      Mental Status: She is alert.   Psychiatric:         Attention and Perception: Attention and perception normal. She does not perceive auditory or visual hallucinations.         Mood and Affect: Affect normal. Mood is anxious and depressed.         Speech: Speech normal.         Behavior: Behavior normal. Behavior is not agitated, aggressive, hyperactive or combative. Behavior is cooperative.         Thought Content: Thought content is not paranoid or delusional. Thought content does not include homicidal ideation.         Cognition and Memory: Cognition and memory normal.         Judgment: Judgment is impulsive.         ED Course      Procedures       Mental Health Risk Assessment      PSS-3    Date and Time Over the past 2 weeks have you felt down, depressed, or hopeless? Over the past 2 weeks have you had thoughts of  killing yourself? Have you ever attempted to kill yourself? When did this last happen? User   01/31/22 1827 yes yes no -- GABINO      C-SSRS ([place])    Date and Time Q1 Wished to be Dead (Past Month) Q2 Suicidal Thoughts (Past Month) Q3 Suicidal Thought Method Q4 Suicidal Intent without Specific Plan Q5 Suicide Intent with Specific Plan Q6 Suicide Behavior (Lifetime) Within the Past 3 Months? RETIRED: Level of Risk per Screen Screening Not Complete User   01/31/22 1827 yes yes no no no no -- -- -- [facility] [place] [facility]              Suicide assessment completed by mental health (D.E.C., LCSW, etc.)       Results for orders placed or performed during the hospital encounter of 01/31/22   HCG qualitative urine     Status: Normal   Result Value Ref Range    hCG Urine Qualitative Negative Negative   Drug abuse screen 1 urine (ED)     Status: Abnormal   Result Value Ref Range    Amphetamines Urine Screen Negative Screen Negative    Barbiturates Urine Screen Negative Screen Negative    Benzodiazepines Urine Screen Negative Screen Negative    Cannabinoids Urine Screen Positive (A) Screen Negative    Cocaine Urine Screen Negative Screen Negative    Opiates Urine Screen Negative Screen Negative   Urine Drugs of Abuse Screen     Status: Abnormal    Narrative    The following orders were created for panel order Urine Drugs of Abuse Screen.  Procedure                               Abnormality         Status                     ---------                               -----------         ------                     Drug abuse screen 1 urin...[[id number]]  Abnormal            Final result                 Please view results for these tests on the individual orders.     Medications - No data to display     Assessments & Plan (with Medical Decision Making)   Patient with history of depression and anxiety who has been feeling overwhelmed and resorting to hitting herself in the face. She came in as she felt too impulsive and unsafe to be in the community.  She was offered an admission which she initially accepted. She later changed her mind and wanted to go home, citing that she can be safe. She is open to an outpatient PHP. [facility] made this referral. I do not feel she needs to be held. She can be discharged.    I have reviewed the nursing notes. I have reviewed the findings, diagnosis, plan and need for follow up with the patient.    Discharge Medication List as of 1/31/2022  9:24 PM          Final diagnoses:   Self-injurious behavior   Adjustment disorder with mixed disturbance of emotions and conduct       --  Gregg Callahan MD  [facility] EMERGENCY DEPARTMENT  1/31/2022     Gregg Callahan MD  01/31/22 2130

## 2022-02-01 NOTE — DISCHARGE INSTRUCTIONS
Follow-up [facility]-referred Adult Partial Hospitalization for intensive support and programming and treatment. A consultant psychiatrist through the program will look into further medication adjustment consideration.  Follow-up established care and services.

## 2022-02-01 NOTE — ED NOTES
Maria Alejandra Betts  January 31, 2022  SAFE Note    Critical Safety Issues:      Current Suicidal Ideation/Self-Injurious Concerns/Methods: Hitting/Punching Self      Current or Historical Inappropriate Sexual Behavior: No      Current or Historical Aggression/Homicidal Ideation: Impaired Self-Control      Triggers: unknown    Updated care team: Yes:     For additional details see full LMHP assessment.       Nirali Brown MSW
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
## 2022-02-04 ENCOUNTER — TELEPHONE (OUTPATIENT)
Dept: BEHAVIORAL HEALTH | Facility: CLINIC | Age: 22
End: 2022-02-04
Payer: COMMERCIAL

## 2022-02-09 ENCOUNTER — DOCUMENTATION ONLY (OUTPATIENT)
Dept: BEHAVIORAL HEALTH | Facility: CLINIC | Age: 22
End: 2022-02-09
Payer: COMMERCIAL

## 2022-02-09 NOTE — PROGRESS NOTES
[facility] reviewed chart per BPA protocol. Pt has been in regular contact with providers and has a safety plan in place. [facility] messaged provider to inquire about any further assistance she can offer.     Karissa ONEILL [facility]

## 2022-03-01 ENCOUNTER — E-VISIT (OUTPATIENT)
Dept: FAMILY MEDICINE | Facility: CLINIC | Age: 22
End: 2022-03-01
Payer: COMMERCIAL

## 2022-03-01 DIAGNOSIS — K90.49 FOOD INTOLERANCE: Primary | ICD-10-CM

## 2022-03-01 PROCEDURE — 99207 PR NO CHARGE LOS: CPT | Performed by: FAMILY MEDICINE

## 2022-03-13 ENCOUNTER — HEALTH MAINTENANCE LETTER (OUTPATIENT)
Age: 22
End: 2022-03-13

## 2022-08-28 ENCOUNTER — HEALTH MAINTENANCE LETTER (OUTPATIENT)
Age: 22
End: 2022-08-28

## 2022-09-27 ENCOUNTER — OFFICE VISIT (OUTPATIENT)
Dept: URGENT CARE | Facility: URGENT CARE | Age: 22
End: 2022-09-27
Payer: COMMERCIAL

## 2022-09-27 VITALS
WEIGHT: 153 LBS | BODY MASS INDEX: 23.26 KG/M2 | TEMPERATURE: 98.1 F | DIASTOLIC BLOOD PRESSURE: 77 MMHG | HEART RATE: 73 BPM | SYSTOLIC BLOOD PRESSURE: 125 MMHG | OXYGEN SATURATION: 100 %

## 2022-09-27 DIAGNOSIS — B97.89 SORE THROAT (VIRAL): Primary | ICD-10-CM

## 2022-09-27 DIAGNOSIS — J02.8 SORE THROAT (VIRAL): Primary | ICD-10-CM

## 2022-09-27 LAB
DEPRECATED S PYO AG THROAT QL EIA: NEGATIVE
GROUP A STREP BY PCR: NOT DETECTED

## 2022-09-27 PROCEDURE — 87651 STREP A DNA AMP PROBE: CPT | Performed by: PHYSICIAN ASSISTANT

## 2022-09-27 PROCEDURE — 99213 OFFICE O/P EST LOW 20 MIN: CPT | Performed by: PHYSICIAN ASSISTANT

## 2022-09-27 RX ORDER — IBUPROFEN 600 MG/1
600 TABLET, FILM COATED ORAL EVERY 6 HOURS PRN
Qty: 30 TABLET | Refills: 0 | Status: SHIPPED | OUTPATIENT
Start: 2022-09-27 | End: 2024-06-13

## 2022-09-27 NOTE — PROGRESS NOTES
Assessment & Plan     Sore throat (viral)    You or your child have a sore throat (pharyngitis). This infection is caused by a virus. It can cause throat pain that is worse when swallowing, aching all over, headache, and fever. The infection may be spread by coughing, kissing, or touching others after touching your mouth or nose. Antibiotic medicines don't work against viruses. They are not used for treating this illness.       Motrin for sore throat  Magic mouthwash prn  - Streptococcus A Rapid Screen w/Reflex to PCR - Clinic Collect  - Group A Streptococcus PCR Throat Swab  - ibuprofen (ADVIL/MOTRIN) 600 MG tablet; Take 1 tablet (600 mg) by mouth every 6 hours as needed for moderate pain  - magic mouthwash (ENTER INGREDIENTS IN COMMENTS) suspension; Swish and spit 5-10 mLs in mouth every 6 hours as needed 30 ml of Benadryl (12.5 mg/5 ml), 60 ml Maalox, 30 ml Viscous Lidocaine     Nicotine/Tobacco Cessation:  She reports that she has been smoking vaping device. She has never used smokeless tobacco.  Nicotine/Tobacco Cessation Plan:         At today's visit with Maria Alejandra Betts , we discussed results, diagnosis, medications and formulated a plan.  We also discussed red flags for immediate return to clinic/ER, as well as indications for follow up with PCP if not improved in 3 days. Patient understood and agreed to plan. Maria Alejandra Betts was discharged with stable vitals and has no further questions.       No follow-ups on file.    Joo Vasquez, [facility], PA-C  [facility] [place]    Kiesha Bess is a 22 year old presenting for the following health issues:  Urgent Care and Abnormal Cardiac Function Study (C//O sore throat and congestion for 2 days)      HPI Review of Systems   Constitutional, HEENT, cardiovascular, pulmonary, gi and gu systems are negative, except as otherwise noted.      Objective    /77   Pulse 73   Temp 98.1  F (36.7  C) (Tympanic)   Wt 69.4 kg (153 lb)   LMP  09/13/2022   SpO2 100%   BMI 23.26 kg/m    Body mass index is 23.26 kg/m .  Physical Exam   GENERAL: healthy, alert and no distress  EYES: Eyes grossly normal to inspection, PERRL and conjunctivae and sclerae normal  HENT: ear canals and TM's normal, nose and mouth without ulcers or lesions  NECK: no adenopathy, no asymmetry, masses, or scars and thyroid normal to palpation  RESP: lungs clear to auscultation - no rales, rhonchi or wheezes  CV: regular rate and rhythm, normal S1 S2, no S3 or S4, no murmur, click or rub, no peripheral edema and peripheral pulses strong  MS: no gross musculoskeletal defects noted, no edema  SKIN: no suspicious lesions or rashes  NEURO: Normal strength and tone, mentation intact and speech normal  PSYCH: mentation appears normal, affect normal/bright      Results for orders placed or performed in visit on 09/27/22   Streptococcus A Rapid Screen w/Reflex to PCR - Clinic Collect     Status: Normal    Specimen: Throat; Swab   Result Value Ref Range    Group A Strep antigen Negative Negative

## 2023-01-26 ENCOUNTER — OFFICE VISIT (OUTPATIENT)
Dept: FAMILY MEDICINE | Facility: CLINIC | Age: 23
End: 2023-01-26
Payer: COMMERCIAL

## 2023-01-26 VITALS
HEIGHT: 68 IN | SYSTOLIC BLOOD PRESSURE: 125 MMHG | TEMPERATURE: 98.6 F | WEIGHT: 162.2 LBS | HEART RATE: 72 BPM | OXYGEN SATURATION: 100 % | BODY MASS INDEX: 24.58 KG/M2 | RESPIRATION RATE: 16 BRPM | DIASTOLIC BLOOD PRESSURE: 86 MMHG

## 2023-01-26 DIAGNOSIS — E55.9 VITAMIN D DEFICIENCY: ICD-10-CM

## 2023-01-26 DIAGNOSIS — F32.A DEPRESSION, UNSPECIFIED DEPRESSION TYPE: ICD-10-CM

## 2023-01-26 DIAGNOSIS — K29.50 CHRONIC GASTRITIS WITHOUT BLEEDING, UNSPECIFIED GASTRITIS TYPE: ICD-10-CM

## 2023-01-26 DIAGNOSIS — G43.009 MIGRAINE WITHOUT AURA AND WITHOUT STATUS MIGRAINOSUS, NOT INTRACTABLE: ICD-10-CM

## 2023-01-26 DIAGNOSIS — R41.840 CONCENTRATION DEFICIT: ICD-10-CM

## 2023-01-26 DIAGNOSIS — F41.9 ANXIETY: ICD-10-CM

## 2023-01-26 DIAGNOSIS — R19.5 LOOSE STOOLS: ICD-10-CM

## 2023-01-26 DIAGNOSIS — Z11.3 SCREENING FOR STDS (SEXUALLY TRANSMITTED DISEASES): ICD-10-CM

## 2023-01-26 DIAGNOSIS — N92.1 MENORRHAGIA WITH IRREGULAR CYCLE: ICD-10-CM

## 2023-01-26 DIAGNOSIS — Z00.00 ROUTINE GENERAL MEDICAL EXAMINATION AT A HEALTH CARE FACILITY: Primary | ICD-10-CM

## 2023-01-26 DIAGNOSIS — Z71.6 ENCOUNTER FOR SMOKING CESSATION COUNSELING: ICD-10-CM

## 2023-01-26 DIAGNOSIS — F60.3 BORDERLINE PERSONALITY DISORDER (H): ICD-10-CM

## 2023-01-26 DIAGNOSIS — S06.9X9S TRAUMATIC BRAIN INJURY WITH LOSS OF CONSCIOUSNESS, SEQUELA (H): Primary | ICD-10-CM

## 2023-01-26 LAB
ERYTHROCYTE [DISTWIDTH] IN BLOOD BY AUTOMATED COUNT: 13 % (ref 10–15)
HCT VFR BLD AUTO: 42.9 % (ref 35–47)
HGB BLD-MCNC: 13.6 G/DL (ref 11.7–15.7)
MCH RBC QN AUTO: 28 PG (ref 26.5–33)
MCHC RBC AUTO-ENTMCNC: 31.7 G/DL (ref 31.5–36.5)
MCV RBC AUTO: 88 FL (ref 78–100)
PLATELET # BLD AUTO: 329 10E3/UL (ref 150–450)
RBC # BLD AUTO: 4.86 10E6/UL (ref 3.8–5.2)
WBC # BLD AUTO: 6.2 10E3/UL (ref 4–11)

## 2023-01-26 PROCEDURE — 99214 OFFICE O/P EST MOD 30 MIN: CPT | Mod: 25 | Performed by: FAMILY MEDICINE

## 2023-01-26 PROCEDURE — 0134A COVID-19 VACCINE BIVALENT BOOSTER 18+ (MODERNA): CPT | Performed by: FAMILY MEDICINE

## 2023-01-26 PROCEDURE — 87389 HIV-1 AG W/HIV-1&-2 AB AG IA: CPT | Performed by: FAMILY MEDICINE

## 2023-01-26 PROCEDURE — 90472 IMMUNIZATION ADMIN EACH ADD: CPT | Performed by: FAMILY MEDICINE

## 2023-01-26 PROCEDURE — 82728 ASSAY OF FERRITIN: CPT | Performed by: FAMILY MEDICINE

## 2023-01-26 PROCEDURE — 90471 IMMUNIZATION ADMIN: CPT | Performed by: FAMILY MEDICINE

## 2023-01-26 PROCEDURE — 86780 TREPONEMA PALLIDUM: CPT | Performed by: FAMILY MEDICINE

## 2023-01-26 PROCEDURE — 83540 ASSAY OF IRON: CPT | Performed by: FAMILY MEDICINE

## 2023-01-26 PROCEDURE — 91313 COVID-19 VACCINE BIVALENT BOOSTER 18+ (MODERNA): CPT | Performed by: FAMILY MEDICINE

## 2023-01-26 PROCEDURE — 85027 COMPLETE CBC AUTOMATED: CPT | Performed by: FAMILY MEDICINE

## 2023-01-26 PROCEDURE — 87491 CHLMYD TRACH DNA AMP PROBE: CPT | Performed by: FAMILY MEDICINE

## 2023-01-26 PROCEDURE — 84443 ASSAY THYROID STIM HORMONE: CPT | Performed by: FAMILY MEDICINE

## 2023-01-26 PROCEDURE — 87591 N.GONORRHOEAE DNA AMP PROB: CPT | Performed by: FAMILY MEDICINE

## 2023-01-26 PROCEDURE — 83550 IRON BINDING TEST: CPT | Performed by: FAMILY MEDICINE

## 2023-01-26 PROCEDURE — 90686 IIV4 VACC NO PRSV 0.5 ML IM: CPT | Performed by: FAMILY MEDICINE

## 2023-01-26 PROCEDURE — 90677 PCV20 VACCINE IM: CPT | Performed by: FAMILY MEDICINE

## 2023-01-26 PROCEDURE — 82306 VITAMIN D 25 HYDROXY: CPT | Performed by: FAMILY MEDICINE

## 2023-01-26 PROCEDURE — 90651 9VHPV VACCINE 2/3 DOSE IM: CPT | Performed by: FAMILY MEDICINE

## 2023-01-26 PROCEDURE — 99395 PREV VISIT EST AGE 18-39: CPT | Mod: 25 | Performed by: FAMILY MEDICINE

## 2023-01-26 PROCEDURE — 36415 COLL VENOUS BLD VENIPUNCTURE: CPT | Performed by: FAMILY MEDICINE

## 2023-01-26 RX ORDER — SUMATRIPTAN 50 MG/1
TABLET, FILM COATED ORAL
Qty: 18 TABLET | Refills: 11 | Status: SHIPPED | OUTPATIENT
Start: 2023-01-26 | End: 2023-11-27

## 2023-01-26 RX ORDER — CITALOPRAM HYDROBROMIDE 20 MG/1
20 TABLET ORAL DAILY
Qty: 90 TABLET | Refills: 1 | Status: SHIPPED | OUTPATIENT
Start: 2023-01-26 | End: 2023-12-20

## 2023-01-26 ASSESSMENT — ENCOUNTER SYMPTOMS
HEMATURIA: 0
PALPITATIONS: 1
DIZZINESS: 0
FEVER: 0
CONSTIPATION: 1
SHORTNESS OF BREATH: 1
ARTHRALGIAS: 0
PARESTHESIAS: 1
SORE THROAT: 0
NERVOUS/ANXIOUS: 1
DYSURIA: 0
HEADACHES: 1
HEARTBURN: 0
DIARRHEA: 0
EYE PAIN: 0
FREQUENCY: 0
CHILLS: 0
JOINT SWELLING: 0
HEMATOCHEZIA: 0
ABDOMINAL PAIN: 0
NAUSEA: 0
MYALGIAS: 1
WEAKNESS: 0
COUGH: 0

## 2023-01-26 ASSESSMENT — PATIENT HEALTH QUESTIONNAIRE - PHQ9
SUM OF ALL RESPONSES TO PHQ QUESTIONS 1-9: 16
SUM OF ALL RESPONSES TO PHQ QUESTIONS 1-9: 16
10. IF YOU CHECKED OFF ANY PROBLEMS, HOW DIFFICULT HAVE THESE PROBLEMS MADE IT FOR YOU TO DO YOUR WORK, TAKE CARE OF THINGS AT HOME, OR GET ALONG WITH OTHER PEOPLE: VERY DIFFICULT

## 2023-01-26 ASSESSMENT — PAIN SCALES - GENERAL: PAINLEVEL: EXTREME PAIN (8)

## 2023-01-26 NOTE — PROGRESS NOTES
SUBJECTIVE:   CC: Maria Alejandra is an 22 year old who presents for preventive health visit.   Patient has been advised of split billing requirements and indicates understanding: Yes  Healthy Habits:     Getting at least 3 servings of Calcium per day:  NO    Bi-annual eye exam:  NO    Dental care twice a year:  NO    Sleep apnea or symptoms of sleep apnea:  Daytime drowsiness    Diet:  Regular (no restrictions)    Frequency of exercise:  None    Taking medications regularly:  Yes    Medication side effects:  None    PHQ-2 Total Score: 4    Additional concerns today:  Yes    Answers for HPI/ROS submitted by the patient on 1/26/2023  If you checked off any problems, how difficult have these problems made it for you to do your work, take care of things at home, or get along with other people?: Very difficult  PHQ9 TOTAL SCORE: 16    1)  Well adult  UTD on vaccines, pap smear UTD    2)  vaping since 2023  Has cravings  Has tried to quit  Lasted 2 months      3)  Borderline personality  Was seen with an former therapist    4) upset stomach:  Several months  Wonders about intolearnce  No rash  Loose stools noted  They avoid dairy  Family history of this is not clear  Sometimes heartburn    Chest tightness  Heavy pounding heart  Hair loss - not noted  some weight gain  Noted at rest  Resolves on it's own  Ice is calming  Has had anxiety  celexa worked for a long time - stopped using this regularly then it stopped working  They felt not moooy more energy and more excitement  Felt cold like symptoms. Initially   Has tried prozac - only took for a few days - felt bad      Help regulating emotions    Is in school - resumed this fall  Has noted  Sounds have been irritating  Sensitive to sounds and frustration and affecting sleep  Has sound machine - this helps  Marrero had this in the past but not as bad as now  fami hs is not noted that they know of      Heavy periods  Regular cycles but heavy  Has Ibuprifen about 15 times per  day  Used for migraines    Migraines:  Has had headaches since TBI  Had them prior but worsened about injury  Worse with stress bright lights  Has not tried   Not very helpful  Headaches are noted in mom and younger sister              Today's PHQ-2 Score:   PHQ-2 ( 2000 [employer]) 1/26/2023   Q1: Little interest or pleasure in doing things 2   Q2: Feeling down, depressed or hopeless 2   PHQ-2 Score 4   PHQ-2 Total Score (12-17 Years)- Positive if 3 or more points; Administer PHQ-A if positive -   Q1: Little interest or pleasure in doing things More than half the days   Q2: Feeling down, depressed or hopeless More than half the days   PHQ-2 Score 4   Some encounter information is confidential and restricted. Go to Review Flowsheets activity to see all data.           Social History     Tobacco Use     Smoking status: Every Day     Types: Vaping Device     Smokeless tobacco: Never   Substance Use Topics     Alcohol use: Yes     Comment: rarely     If you drink alcohol do you typically have >3 drinks per day or >7 drinks per week? No    Alcohol Use 1/26/2023   Prescreen: >3 drinks/day or >7 drinks/week? No   Prescreen: >3 drinks/day or >7 drinks/week? -   No flowsheet data found.    Reviewed orders with patient.  Reviewed health maintenance and updated orders accordingly - Yes  Lab work is in process    Breast Cancer Screening:        History of abnormal Pap smear: NO - age 21-29 PAP every 3 years recommended  PAP / HPV Latest Ref Rng & Units 7/12/2021   PAP   Negative for Intraepithelial Lesion or Malignancy (NILM)     Reviewed and updated as needed this visit by clinical staff                  Reviewed and updated as needed this visit by Provider                 No past medical history on file.   No past surgical history on file.    Review of Systems   Constitutional: Negative for chills and fever.   HENT: Negative for congestion, ear pain, hearing loss and sore throat.    Eyes: Negative for pain and visual  "disturbance.   Respiratory: Positive for shortness of breath. Negative for cough.    Cardiovascular: Positive for chest pain and palpitations. Negative for peripheral edema.   Gastrointestinal: Positive for constipation. Negative for abdominal pain, diarrhea, heartburn, hematochezia and nausea.   Genitourinary: Negative for dysuria, frequency, genital sores, hematuria and urgency.   Musculoskeletal: Positive for myalgias. Negative for arthralgias and joint swelling.   Skin: Negative for rash.   Neurological: Positive for headaches and paresthesias. Negative for dizziness and weakness.   Psychiatric/Behavioral: Negative for mood changes. The patient is nervous/anxious.           OBJECTIVE:   /86   Pulse 72   Temp 98.6  F (37  C) (Temporal)   Resp 16   Ht 1.727 m (5' 8\")   Wt 73.6 kg (162 lb 3.2 oz)   LMP 01/14/2023 (Approximate)   SpO2 100%   BMI 24.66 kg/m    Physical Exam  GENERAL: healthy, alert and no distress  EYES: Eyes grossly normal to inspection, PERRL and conjunctivae and sclerae normal  HENT: ear canals and TM's normal, nose and mouth without ulcers or lesions  NECK: no adenopathy, no asymmetry, masses, or scars and thyroid normal to palpation  RESP: lungs clear to auscultation - no rales, rhonchi or wheezes  CV: regular rate and rhythm, normal S1 S2, no S3 or S4, no murmur, click or rub, no peripheral edema and peripheral pulses strong  ABDOMEN: soft, nontender, no hepatosplenomegaly, no masses and bowel sounds normal  MS: no gross musculoskeletal defects noted, no edema  SKIN: no suspicious lesions or rashes  NEURO: Normal strength and tone, mentation intact and speech normal  PSYCH: mentation appears normal, affect normal/bright    Diagnostic Test Results:  Labs reviewed in Epic    ASSESSMENT/PLAN:   (Z00.00) Routine general medical examination at a health care facility  (primary encounter diagnosis)  Comment:   Plan:     (F60.3) Borderline personality disorder (H)  Comment: referrals for " therapist, DBT?  Restarting medication  Plan: Adult Mental Health  Referral,         citalopram (CELEXA) 20 MG tablet, Adult Mental         Health  Referral            (F41.9) Anxiety  Comment:   Plan: citalopram (CELEXA) 20 MG tablet        Recheck in 4-6 weeks    (F32.A) Depression, unspecified depression type  Comment: as noted above  Plan: citalopram (CELEXA) 20 MG tablet, TSH with free        T4 reflex            (N92.1) Menorrhagia with irregular cycle  Comment: discussed options for heavy periods - IUD, nexplanon, etc  Plan: TSH with free T4 reflex, CBC with platelets,         Iron and iron binding capacity, Ferritin,         Vitamin D Deficiency            (R19.5) Loose stools  Comment: labs to evaluate - possible IBS  Plan: Helicobacter pylori Antigen Stool        Consider elimination diet    (K29.50) Chronic gastritis without bleeding, unspecified gastritis type  Comment: discussed foods to avoid  Plan:     (Z71.6) Encounter for smoking cessation counseling  Comment:   Plan: Med Therapy Management Referral        vaping has caused some dyspnea    (R41.840) Concentration deficit  Comment: concern for possible ADHD.  Difficulty organizing  Plan: Adult Mental Health  Referral, Adult         Mental Health  Referral            (G43.009) Migraine without aura and without status migrainosus, not intractable  Comment: discussed trying emgality if affordable  Plan: SUMAtriptan (IMITREX) 50 MG tablet,         DISCONTINUED: galcanezumab-gnlm (EMGALITY) 120         MG/ML injection            (E55.9) Vitamin D deficiency  Comment:   Plan: vitamin D2 (ERGOCALCIFEROL) 00501 units (1250         mcg) capsule, Vitamin D Deficiency            (Z11.3) Screening for STDs (sexually transmitted diseases)  Comment:   Plan: NEISSERIA GONORRHOEA PCR, CHLAMYDIA TRACHOMATIS        PCR, HIV Antigen Antibody Combo, Treponema Abs         w Reflex to RPR and Titer              Patient has been advised  of split billing requirements and indicates understanding: Yes      COUNSELING:  Reviewed preventive health counseling, as reflected in patient instructions        She reports that she has been smoking vaping device. She has never used smokeless tobacco.  Nicotine/Tobacco Cessation Plan:   Medication Therapy Management  (Referral to MTM)      Kenzie Walker MD  [facility]

## 2023-01-26 NOTE — LETTER
January 26, 2023      Maria Alejandra Betts  [address] [place] [id number]              To Whom it May Concern,    This patient was seen for acute and chronic medical issues and might need to miss work for up to 3 days per month for the next few months to attend follow up visits and referrals.  Please contact me with any concerns      Sincerely,      Kenzie Walker MD     18-Nov-2021 00:50

## 2023-01-26 NOTE — PATIENT INSTRUCTIONS
"Stomach Issues:  Can try Omeprazole/Prilosec 20 mg OTC for this 2 week course    Elimination diets    The Whole 30 - book library      FODMAP diet  F Fermentable   O Oligosaccharides Fructans, galacto-oligosaccharides Wheat, barley, rye, onion, carmen, white part of spring onion, garlic, shallots, artichokes, beetroot, fennel, peas, chicory, pistachio, cashews, legumes, lentils, and chickpeas   D Disaccharides Lactose Milk, custard, ice cream, and yogurt   M Monosaccharides \"Free fructose\" (fructose in excess of glucose) Apples, pears, mangoes, cherries, watermelon, asparagus, sugar snap peas, honey, high-fructose corn syrup   A And   P Polyols Sorbitol, mannitol, maltitol, and xylitol Apples, pears, apricots, cherries, nectarines, peaches, plums, watermelon, mushrooms, cauliflower, artificially sweetened chewing gum and confectionery           I think counseling would be very helpful for you.  The best way to find out if a counselor is covered under your insurance is to call the mental health line on the back of your card and see where they cover.  Here are some other good people or clinics that I've worked with:    There is the [facility], which is free,  It's run by volunteers - both people who are fully licensed and people who are still under supervision.    [place] [email or web address]  [address] [place] [address]  [phone]    [email or web address] - this is a search engine to look for bios on therapists    [facility]  [phone]  Daron Lowe  Love both sides consulting POC focus    [facility]  [email or web address]   and POC talk therapy  Several sites  [phone]    Janlele Schofield:  Roe Teran:  Teens  [employer]      [facility] consulting      Tiarra Garcia  Transracial and international adoption    Bibi Bonner:  POC interest    [facility] -  " "therapist group    [facility]  African American therapist      Centered  Care:  [facility]    Dr. Bharat Yeager  \"Chemistry of Laisha\"  [facility]  Does not take insurance    Nella Gaines:  [phone] - no insurance  [phone]    Janelle Mendoza -- Art of Counseling:  [facility]  Takes insurance  Sees teens    Julissa Teo:  Psych recovery  [address]  [address]  [place], [place]  [phone]      Santa Angulo PhD, LP  Licensed Psychologist  [phone]  [email or web address]    Luz Maria Ordonez MA  [facility] Therapist  [address]  [place] [address] [phone]    [facility]  [phone]  Guillermina Dixon or others    [facility]  [address]  [place]   [phone] (answered 24 hours)      Priscilla Arriaga  Specializes in Eating Disorders  [address] [facility]  [phone]    Cheryl Edwards   EMDR provider      Mark Davila  [place] [facility]  [address] [place] [address] [phone]    [facility]  Alexus Mcdonough  OR Carly [facility])  [phone]  They are very good and have therapists who focus on grief, anxiety and adjustment    Ada Kelly M.A., OSMAN  Specialties:  Adjustment Problems/Stress, Anxiety, Death and Dying, Depression, Grief and Loss, Physical/Chronic Health Issues, Posttraumatic Stress Disorder, Self Esteem, Trauma, Work Issues  Services:  Couples Psychotherapy, EMDR, Group Therapy, Individual psychotherapy  Ages Served:  Adolescent (age 13-17), Adult (age 18-64), Elder (age 65+)    Private Practice  [address] [address]  [place] [facility]  [address] [address]    Marilin CHRIS  Individual and Group Psychotherapy  Dialectic Behavior Therapy  Teens, Adults  [facility]  [phone]  [email or web address]  People " Incorporated  [facility]  [place] [phone]    [place]  [email or web address]  [phone]  Dr. Jesi Holland  Therapist  [facility]  [phone]    Ada Lewiselmer DiazDevin: [phone]    Camila Holguin: [phone]    Rachel Hogan:  [phone]    [facility] psychology:  [phone]    Dr. Deya Aaron  Child psychologist (0-8+)  [phone]  [address] [place] [address]    Rolando Gaffney , [place]  [phone]  Peds Psychiatry     [facility]/Eating disorders  Lelo Espinal  [phone]  [email or web address]  General number  [phone] SLP    Mental health emergency:    [facility] mobile crisis teams:  Adults - COPE [phone]    CHildren 17 and under - [phone]    Preventive Health Recommendations  Female Ages 21 to 25     Yearly exam:   See your health care provider every year in order to  Review health changes.   Discuss preventive care.    Review your medicines if your doctor has prescribed any.    You should be tested each year for STDs (sexually transmitted diseases).     Talk to your provider about how often you should have cholesterol testing.    Get a Pap test every three years. If you have an abnormal result, your doctor may have you test more often.    If you are at risk for diabetes, you should have a diabetes test (fasting glucose).     Shots:   Get a flu shot each year.   Get a tetanus shot every 10 years.   Consider getting the shot (vaccine) that prevents cervical cancer (Gardasil).    Nutrition:   Eat at least 5 servings of fruits and vegetables each day.  Eat whole-grain bread, whole-wheat pasta and brown rice instead of white grains and rice.  Get adequate Calcium and Vitamin D.     Lifestyle  Exercise at least 150 minutes a week each week (30 minutes a day, 5 days a week). This will help you control your weight and prevent disease.  Limit alcohol to one drink per day.  No smoking.   Wear sunscreen to prevent  skin cancer.  See your dentist every six months for an exam and cleaning.

## 2023-01-26 NOTE — LETTER
January 26, 2023      Maria Alejandra Betts  [address] [place] [id number]        To Whom It May Concern:    Maria Alejandra Betts was seen in our clinic. She may need 3-4 days off of work for follow medical appointments.       If you have any questions please feel free to reach out to me. [phone]  Sincerely,      Kenzie Walker MD

## 2023-01-27 ENCOUNTER — TELEPHONE (OUTPATIENT)
Dept: FAMILY MEDICINE | Facility: CLINIC | Age: 23
End: 2023-01-27

## 2023-01-27 LAB
DEPRECATED CALCIDIOL+CALCIFEROL SERPL-MC: 6 UG/L (ref 20–75)
FERRITIN SERPL-MCNC: 54 NG/ML (ref 6–175)
HIV 1+2 AB+HIV1 P24 AG SERPL QL IA: NONREACTIVE
IRON BINDING CAPACITY (ROCHE): 432 UG/DL (ref 240–430)
IRON SATN MFR SERPL: 33 % (ref 15–46)
IRON SERPL-MCNC: 142 UG/DL (ref 37–145)
T PALLIDUM AB SER QL: NONREACTIVE
TSH SERPL DL<=0.005 MIU/L-ACNC: 0.92 UIU/ML (ref 0.3–4.2)

## 2023-01-27 RX ORDER — GALCANEZUMAB 120 MG/ML
120 INJECTION, SOLUTION SUBCUTANEOUS ONCE
Qty: 1 ML | Refills: 0 | Status: SHIPPED | OUTPATIENT
Start: 2023-01-27 | End: 2023-01-27

## 2023-01-27 NOTE — TELEPHONE ENCOUNTER
Prior Authorization Retail Medication Request    Medication/Dose: galcanezumab-gnlm (EMGALITY) 120 MG/ML injection  ICD code (if different than what is on RX):  Migraine without aura and without status migrainosus, not intractable [G43.009]   Previously Tried and Failed:  unknown  Rationale:  unknown    Insurance Name:  [facility]  Insurance ID:  [id number]

## 2023-01-27 NOTE — TELEPHONE ENCOUNTER
Can you see if the insurance was not covering the loading dose of 240 mg?  If so we can skip this.  I signed the maintenance dose instead    If they need a different CGRP agent I can do this too    Pt has tried and failed many other meds    SN

## 2023-01-27 NOTE — TELEPHONE ENCOUNTER
Routing refill request to provider for review/approval because:  Alternative being requested by pharmacy or pa.  Jillian DUNBAR RN

## 2023-01-27 NOTE — TELEPHONE ENCOUNTER
SN,  Spoke with pharmacy staff.  Insurance does not cover medication in general.  Can start PA or can try other medication.    TCs:   Please initiate PA for Emgality.  Thank you,  Jillian VELASCO RN

## 2023-01-28 LAB
C TRACH DNA SPEC QL NAA+PROBE: NEGATIVE
N GONORRHOEA DNA SPEC QL NAA+PROBE: NEGATIVE

## 2023-01-29 ENCOUNTER — MYC MEDICAL ADVICE (OUTPATIENT)
Dept: FAMILY MEDICINE | Facility: CLINIC | Age: 23
End: 2023-01-29
Payer: COMMERCIAL

## 2023-01-29 NOTE — LETTER
February 13, 2023      Maria Alejandra Betts  [address] [place] [phone]              To whom it may concern,    Please excuse this patient from missing work this week.  They are being scheduled for an urgent assessment.           Sincerely,      Kenzie Walker MD

## 2023-01-30 ENCOUNTER — MYC MEDICAL ADVICE (OUTPATIENT)
Dept: FAMILY MEDICINE | Facility: CLINIC | Age: 23
End: 2023-01-30
Payer: COMMERCIAL

## 2023-01-31 ENCOUNTER — TELEPHONE (OUTPATIENT)
Dept: FAMILY MEDICINE | Facility: CLINIC | Age: 23
End: 2023-01-31
Payer: COMMERCIAL

## 2023-01-31 NOTE — TELEPHONE ENCOUNTER
MTM referral from: [place] clinic visit (referral by provider) smoking cessation    MTM referral outreach attempt #2 on January 31, 2023 at 12:11 PM      Outcome: Patient not reachable after several attempts, will route to MTM Pharmacist/Provider as an FYI.  MTM scheduling number is [phone].  Thank you for the referral.    Florecita Anders, MTM     Patient has Eli ELIZONDO-needs full MTM coverage

## 2023-02-06 PROBLEM — F32.A DEPRESSION, UNSPECIFIED DEPRESSION TYPE: Status: ACTIVE | Noted: 2023-02-06

## 2023-02-06 PROBLEM — F41.9 ANXIETY: Status: ACTIVE | Noted: 2023-02-06

## 2023-02-06 PROBLEM — N92.1 MENORRHAGIA WITH IRREGULAR CYCLE: Status: ACTIVE | Noted: 2023-02-06

## 2023-02-06 RX ORDER — ERGOCALCIFEROL 1.25 MG/1
50000 CAPSULE, LIQUID FILLED ORAL WEEKLY
Qty: 8 CAPSULE | Refills: 0 | Status: SHIPPED | OUTPATIENT
Start: 2023-02-06 | End: 2023-03-06

## 2023-02-06 NOTE — RESULT ENCOUNTER NOTE
Hello,    No signs gonorrhea chlamydia syphilis HIV or hepatitis C excellent.  The vitamin D was very low I will send in high-dose vitamin D for you to take once a week and then we can recheck this level.  The iron levels are within normal limits and the ferritin is also within normal limits it does not seem like iron deficiency is causing the complete blood count was also normal.  Lets see if you feel better with vitamin D replacement    Kenzie Walker MD

## 2023-02-13 NOTE — TELEPHONE ENCOUNTER
TC,      Please see message below from SN.  Can someone please call and schedule patient with SN?  DB ok.      Thank you,  Ana Jorgensen RN

## 2023-02-14 NOTE — TELEPHONE ENCOUNTER
Called patient and LVM To call back to get Dbl Bk'd this week per   [facility] Unit Coordinator

## 2023-02-14 NOTE — TELEPHONE ENCOUNTER
Signed letter at [facility] Triage desk  Appears may have already been sent via [employer]  Send signed copy to patient once appointment has been compelted  Roxana RIDLEY RN

## 2023-02-26 DIAGNOSIS — S06.9X9S TRAUMATIC BRAIN INJURY WITH LOSS OF CONSCIOUSNESS, SEQUELA (H): ICD-10-CM

## 2023-02-26 DIAGNOSIS — G43.009 MIGRAINE WITHOUT AURA AND WITHOUT STATUS MIGRAINOSUS, NOT INTRACTABLE: ICD-10-CM

## 2023-02-27 NOTE — TELEPHONE ENCOUNTER
Routing refill request to provider for review/approval because:  Drug not on the [facility] refill protocol   Please advise    Thank you,  Ben VENEGAS RN

## 2023-03-03 DIAGNOSIS — E55.9 VITAMIN D DEFICIENCY: ICD-10-CM

## 2023-03-06 RX ORDER — ERGOCALCIFEROL 1.25 MG/1
50000 CAPSULE, LIQUID FILLED ORAL WEEKLY
Qty: 4 CAPSULE | Refills: 1 | Status: SHIPPED | OUTPATIENT
Start: 2023-03-06 | End: 2023-05-05

## 2023-05-19 ENCOUNTER — OFFICE VISIT (OUTPATIENT)
Dept: URGENT CARE | Facility: URGENT CARE | Age: 23
End: 2023-05-19
Payer: COMMERCIAL

## 2023-05-19 VITALS
OXYGEN SATURATION: 100 % | RESPIRATION RATE: 20 BRPM | BODY MASS INDEX: 25.42 KG/M2 | HEART RATE: 70 BPM | SYSTOLIC BLOOD PRESSURE: 124 MMHG | DIASTOLIC BLOOD PRESSURE: 82 MMHG | TEMPERATURE: 98.2 F | WEIGHT: 167.2 LBS

## 2023-05-19 DIAGNOSIS — Z72.0 TOBACCO ABUSE: ICD-10-CM

## 2023-05-19 DIAGNOSIS — J01.90 ACUTE SINUSITIS WITH SYMPTOMS > 10 DAYS: Primary | ICD-10-CM

## 2023-05-19 PROCEDURE — 99213 OFFICE O/P EST LOW 20 MIN: CPT | Performed by: FAMILY MEDICINE

## 2023-05-19 NOTE — PROGRESS NOTES
SUBJECTIVE: Maria Alejandra Betts is a 23 year old female here with concerns about sinus infection.  She states onset  of symptoms were greater than 10 days with sinus pressure and drainage.  Course of illness is worsening.    Severity: moderate  Current and Associated symptoms: cold symptoms  Predisposing factors include none.   Recent treatment has included: OTC's  Allergic symptoms include: yes    No past medical history on file.  Allergies   Allergen Reactions     Seasonal Allergies      Social History     Tobacco Use     Smoking status: Every Day     Types: Vaping Device     Smokeless tobacco: Never   Vaping Use     Vaping status: Every Day     Substances: Nicotine     Devices: Disposable, Pre-filled or refillable cartridge   Substance Use Topics     Alcohol use: Yes     Comment: rarely       ROS:   no rash  no vomiting    OBJECTIVE:  /82 (BP Location: Right arm, Patient Position: Sitting, Cuff Size: Adult Regular)   Pulse 70   Temp 98.2  F (36.8  C) (Oral)   Resp 20   Wt 75.8 kg (167 lb 3.2 oz)   SpO2 100%   BMI 25.42 kg/m    NAD  EYES: clear, no mattering  EARS: TM's clear, no redness/buldging, canals clear, no swelling/redness  NOSE: discolored discharge samina. Nares  THROAT: clear, no erythema, no exudate  SINUS: maxillary tenderness with palpation  LUNGS: CTAB, no rhonchi/wheeze/rales      ICD-10-CM    1. Acute sinusitis with symptoms > 10 days  J01.90 amoxicillin-clavulanate (AUGMENTIN) 875-125 MG tablet      2. Tobacco abuse  Z72.0           Follow up with primary clinic if not improving

## 2023-09-08 ENCOUNTER — OFFICE VISIT (OUTPATIENT)
Dept: URGENT CARE | Facility: URGENT CARE | Age: 23
End: 2023-09-08
Payer: COMMERCIAL

## 2023-09-08 VITALS
SYSTOLIC BLOOD PRESSURE: 131 MMHG | TEMPERATURE: 96.9 F | WEIGHT: 144.2 LBS | BODY MASS INDEX: 21.93 KG/M2 | OXYGEN SATURATION: 99 % | RESPIRATION RATE: 24 BRPM | DIASTOLIC BLOOD PRESSURE: 88 MMHG | HEART RATE: 86 BPM

## 2023-09-08 DIAGNOSIS — A08.4 VIRAL GASTROENTERITIS: Primary | ICD-10-CM

## 2023-09-08 PROCEDURE — 99213 OFFICE O/P EST LOW 20 MIN: CPT | Performed by: PHYSICIAN ASSISTANT

## 2023-09-08 RX ORDER — LOPERAMIDE HYDROCHLORIDE 2 MG/1
2 TABLET ORAL 4 TIMES DAILY PRN
Qty: 20 TABLET | Refills: 0 | Status: SHIPPED | OUTPATIENT
Start: 2023-09-08 | End: 2023-11-13

## 2023-09-08 ASSESSMENT — ENCOUNTER SYMPTOMS
LIGHT-HEADEDNESS: 0
ALLERGIC/IMMUNOLOGIC NEGATIVE: 1
EYES NEGATIVE: 1
NAUSEA: 0
RHINORRHEA: 0
NECK PAIN: 0
NECK STIFFNESS: 0
BACK PAIN: 0
JOINT SWELLING: 0
PALPITATIONS: 0
HEADACHES: 0
SORE THROAT: 0
MUSCULOSKELETAL NEGATIVE: 1
MYALGIAS: 0
WOUND: 0
CARDIOVASCULAR NEGATIVE: 1
COUGH: 0
DIARRHEA: 1
SHORTNESS OF BREATH: 0
DIZZINESS: 0
WEAKNESS: 0
VOMITING: 0
RESPIRATORY NEGATIVE: 1
ENDOCRINE NEGATIVE: 1
ARTHRALGIAS: 0
FEVER: 0
CHILLS: 0

## 2023-09-08 NOTE — PROGRESS NOTES
Chief Complaint:    Chief Complaint   Patient presents with    Urgent Care    Abdominal Pain     Pain and diarrhea for a week (started last Thursday) and throw up     Vomiting    Diarrhea       Medical Decision Making:    Vital signs reviewed by Heraclio Reagan PA-C  /88   Pulse 86   Temp 96.9  F (36.1  C) (Tympanic)   Resp 24   Wt 65.4 kg (144 lb 3.2 oz)   SpO2 99%   BMI 21.93 kg/m      Differential Diagnosis:  Abdominal Pain: Viral Gastroenteritis      ASSESSMENT:     1. Viral gastroenteritis           PLAN:     Patient is in no acute distress.  She is afebrile with stable vital signs.  Abdominal exam was benign.  Push fluids.  BRAT diet discussed.  Rx for Imodium.  Patient instructed to follow up with PCP in 1 week if symptoms are not improving.  Sooner if symptoms worsen.  Worrisome symptoms discussed with instructions to go to the ED.  Patient verbalized understanding and agreed with this plan.    Labs:     No results found for any visits on 09/08/23.    Current Meds:    Current Outpatient Medications:     loperamide (IMODIUM A-D) 2 MG tablet, Take 1 tablet (2 mg) by mouth 4 times daily as needed for diarrhea, Disp: 20 tablet, Rfl: 0    amitriptyline (ELAVIL) 25 MG tablet, TAKE 1 TABLET (25 MG) BY MOUTH AT BEDTIME FOR 1 WEEK THEN INCREASE TO 2 TABLETS (50MG) NIGHTLY FOR HEADACHES. CAN INCREASE FURTHER TO 3 TABLETS (75MG) IF NEEDED (Patient not taking: Reported on 9/8/2023), Disp: 270 tablet, Rfl: 1    citalopram (CELEXA) 20 MG tablet, Take 1 tablet (20 mg) by mouth daily Start with half tablet daily for 1-2 weeks then whole tablet (Patient not taking: Reported on 9/8/2023), Disp: 90 tablet, Rfl: 1    ibuprofen (ADVIL/MOTRIN) 600 MG tablet, Take 1 tablet (600 mg) by mouth every 6 hours as needed for moderate pain (Patient not taking: Reported on 9/8/2023), Disp: 30 tablet, Rfl: 0    magic mouthwash (ENTER INGREDIENTS IN COMMENTS) suspension, Swish and spit 5-10 mLs in mouth every 6 hours as needed  30 ml of Benadryl (12.5 mg/5 ml), 60 ml Maalox, 30 ml Viscous Lidocaine (Patient not taking: Reported on 1/26/2023), Disp: 120 mL, Rfl: 0    SUMAtriptan (IMITREX) 50 MG tablet, TAKE 1-2 TABLETS BY MOUTH AT ONSET OF HEADACHE FOR MIGRAINE MAY REPEAT IN 2 HOURS. MAX 4 TABS/24 HOURS. (Patient not taking: Reported on 9/8/2023), Disp: 18 tablet, Rfl: 11    Allergies:  Allergies   Allergen Reactions    Seasonal Allergies        SUBJECTIVE    HPI: Maria Alejandra Betts is an 23 year old female who presents for evaluation and treatment of nausea, vomiting, diarrhea, and abdominal pain.  Symptoms started 1 week ago and have improved.  The abdominal pain is more generalized in nature and comes on with bowel movements.  She does still have some diarrhea.  She works at a  and several children have similar symptoms.  No recent travel.  No fever, or chills. No black tarry stool or blood in her stool.      ROS:      Review of Systems   Constitutional:  Negative for chills and fever.   HENT:  Negative for congestion, ear pain, rhinorrhea and sore throat.    Eyes: Negative.    Respiratory: Negative.  Negative for cough and shortness of breath.    Cardiovascular: Negative.  Negative for chest pain and palpitations.   Gastrointestinal:  Positive for diarrhea. Negative for nausea and vomiting.   Endocrine: Negative.    Genitourinary: Negative.    Musculoskeletal: Negative.  Negative for arthralgias, back pain, joint swelling, myalgias, neck pain and neck stiffness.   Skin: Negative.  Negative for rash and wound.   Allergic/Immunologic: Negative.  Negative for immunocompromised state.   Neurological:  Negative for dizziness, weakness, light-headedness and headaches.        Family History   Family History   Problem Relation Age of Onset    Allergies Mother     Back Pain Mother     Asthma Mother     Heart block Maternal Grandmother     Heart Disease Maternal Grandmother     Allergies Maternal Grandmother     Asthma Maternal Grandmother      Heart Disease Paternal Grandmother     Diabetes Paternal Grandmother     Hypertension Paternal Grandmother     Allergies Sister        Social History  Social History     Socioeconomic History    Marital status: Single     Spouse name: Not on file    Number of children: Not on file    Years of education: Not on file    Highest education level: Not on file   Occupational History    Not on file   Tobacco Use    Smoking status: Every Day     Types: Vaping Device    Smokeless tobacco: Never   Vaping Use    Vaping Use: Every day    Substances: Nicotine    Devices: Disposable, Pre-filled or refillable cartridge   Substance and Sexual Activity    Alcohol use: Yes     Comment: rarely    Drug use: Not Currently    Sexual activity: Yes     Partners: Female   Other Topics Concern    Not on file   Social History Narrative    Not on file     Social Determinants of Health     Financial Resource Strain: Not on file   Food Insecurity: Not on file   Transportation Needs: Not on file   Physical Activity: Not on file   Stress: Not on file   Social Connections: Not on file   Intimate Partner Violence: Not on file   Housing Stability: Not on file        Surgical History:  No past surgical history on file.     Problem List:  Patient Active Problem List   Diagnosis    Migraine with aura and without status migrainosus, not intractable    Migraine without aura and without status migrainosus, not intractable    Borderline personality disorder (H)    Menorrhagia with irregular cycle    Anxiety    Depression, unspecified depression type           OBJECTIVE:     Vital signs noted and reviewed by Heraclio Reagan PA-C  /88   Pulse 86   Temp 96.9  F (36.1  C) (Tympanic)   Resp 24   Wt 65.4 kg (144 lb 3.2 oz)   SpO2 99%   BMI 21.93 kg/m       PEFR:    Physical Exam  Vitals and nursing note reviewed.   Constitutional:       General: She is not in acute distress.     Appearance: She is well-developed. She is not ill-appearing,  toxic-appearing or diaphoretic.   HENT:      Head: Normocephalic and atraumatic.      Right Ear: Tympanic membrane and external ear normal. No drainage, swelling or tenderness. Tympanic membrane is not perforated, erythematous, retracted or bulging.      Left Ear: Tympanic membrane and external ear normal. No drainage, swelling or tenderness. Tympanic membrane is not perforated, erythematous, retracted or bulging.      Nose: No mucosal edema, congestion or rhinorrhea.      Right Sinus: No maxillary sinus tenderness or frontal sinus tenderness.      Left Sinus: No maxillary sinus tenderness or frontal sinus tenderness.      Mouth/Throat:      Pharynx: No pharyngeal swelling, oropharyngeal exudate, posterior oropharyngeal erythema or uvula swelling.      Tonsils: No tonsillar abscesses.   Eyes:      Pupils: Pupils are equal, round, and reactive to light.   Neck:      Trachea: Trachea normal.   Cardiovascular:      Rate and Rhythm: Normal rate and regular rhythm.      Heart sounds: Normal heart sounds, S1 normal and S2 normal. No murmur heard.     No friction rub. No gallop.   Pulmonary:      Effort: Pulmonary effort is normal. No respiratory distress.      Breath sounds: Normal breath sounds. No decreased breath sounds, wheezing, rhonchi or rales.   Abdominal:      General: Bowel sounds are normal. There is no distension.      Palpations: Abdomen is soft. Abdomen is not rigid. There is no mass.      Tenderness: There is no abdominal tenderness. There is no right CVA tenderness, left CVA tenderness, guarding or rebound. Negative signs include Navarro's sign, Rovsing's sign and McBurney's sign.   Musculoskeletal:      Cervical back: Full passive range of motion without pain, normal range of motion and neck supple.   Lymphadenopathy:      Cervical: No cervical adenopathy.   Skin:     General: Skin is warm and dry.   Neurological:      Mental Status: She is alert and oriented to person, place, and time.      Cranial  Nerves: No cranial nerve deficit.      Deep Tendon Reflexes: Reflexes are normal and symmetric.   Psychiatric:         Behavior: Behavior normal. Behavior is cooperative.         Thought Content: Thought content normal.         Judgment: Judgment normal.             Heraclio Reagan PA-C  9/8/2023, 11:53 AM

## 2023-09-08 NOTE — LETTER
September 8, 2023      Maria Alejandra Betst  [address]  [facility] [address]        To Whom It May Concern:    Maria Alejandra Betts  was seen on 9/8/2023.  Please excuse her  until fever free due to illness.        Sincerely,        Heraclio Reagan PA-C

## 2023-10-05 ENCOUNTER — TRANSFERRED RECORDS (OUTPATIENT)
Dept: HEALTH INFORMATION MANAGEMENT | Facility: CLINIC | Age: 23
End: 2023-10-05

## 2023-10-08 ENCOUNTER — E-VISIT (OUTPATIENT)
Dept: FAMILY MEDICINE | Facility: CLINIC | Age: 23
End: 2023-10-08
Payer: COMMERCIAL

## 2023-10-08 DIAGNOSIS — R19.7 DIARRHEA OF PRESUMED INFECTIOUS ORIGIN: Primary | ICD-10-CM

## 2023-10-08 PROCEDURE — 99207 PR NON-BILLABLE SERV PER CHARTING: CPT | Performed by: FAMILY MEDICINE

## 2023-10-10 ENCOUNTER — LAB (OUTPATIENT)
Dept: LAB | Facility: CLINIC | Age: 23
End: 2023-10-10
Payer: COMMERCIAL

## 2023-10-10 DIAGNOSIS — R19.7 DIARRHEA OF PRESUMED INFECTIOUS ORIGIN: ICD-10-CM

## 2023-10-11 LAB
ADV 40+41 DNA STL QL NAA+NON-PROBE: NEGATIVE
ASTRO TYP 1-8 RNA STL QL NAA+NON-PROBE: NEGATIVE
C CAYETANENSIS DNA STL QL NAA+NON-PROBE: NEGATIVE
CAMPYLOBACTER DNA SPEC NAA+PROBE: NEGATIVE
CRYPTOSP DNA STL QL NAA+NON-PROBE: NEGATIVE
E COLI O157 DNA STL QL NAA+NON-PROBE: NORMAL
E HISTOLYT DNA STL QL NAA+NON-PROBE: NEGATIVE
EAEC ASTA GENE ISLT QL NAA+PROBE: NEGATIVE
EC STX1+STX2 GENES STL QL NAA+NON-PROBE: NEGATIVE
EPEC EAE GENE STL QL NAA+NON-PROBE: NEGATIVE
ETEC LTA+ST1A+ST1B TOX ST NAA+NON-PROBE: NEGATIVE
G LAMBLIA DNA STL QL NAA+NON-PROBE: NEGATIVE
NOROVIRUS GI+II RNA STL QL NAA+NON-PROBE: NEGATIVE
P SHIGELLOIDES DNA STL QL NAA+NON-PROBE: NEGATIVE
RVA RNA STL QL NAA+NON-PROBE: NEGATIVE
SALMONELLA SP RPOD STL QL NAA+PROBE: NEGATIVE
SAPO I+II+IV+V RNA STL QL NAA+NON-PROBE: NEGATIVE
SHIGELLA SP+EIEC IPAH ST NAA+NON-PROBE: NEGATIVE
V CHOLERAE DNA SPEC QL NAA+PROBE: NEGATIVE
VIBRIO DNA SPEC NAA+PROBE: NEGATIVE
Y ENTEROCOL DNA STL QL NAA+PROBE: NEGATIVE

## 2023-10-11 PROCEDURE — 87507 IADNA-DNA/RNA PROBE TQ 12-25: CPT

## 2023-11-03 NOTE — RESULT ENCOUNTER NOTE
Hello,    The stool tests are all normal.  This is tough to figure out.  You might have inflammatory bowel syndrome or irritable bowel syndrome.    I think we should have you see GI for a more in depth evaluation.  I'll place a referral to see them.    In the meantime I would try to eliminate irritating foods - dairy, gluten, spicy sour acidic foods are good ones to avoid  - and try taking a probiotic like Culturelle which is over the counter at [employer].  You can try using imodium short term as well.    There are some other labs I think would be good to run.  Could you set up a lab only for this as well as a follow up visit with me to talk about results and plan?    Let me know    Kenzie Walker MD

## 2023-11-08 ENCOUNTER — BEH TREATMENT PLAN (OUTPATIENT)
Dept: BEHAVIORAL HEALTH | Facility: CLINIC | Age: 23
End: 2023-11-08
Attending: PSYCHIATRY & NEUROLOGY

## 2023-11-08 ENCOUNTER — TELEPHONE (OUTPATIENT)
Dept: BEHAVIORAL HEALTH | Facility: CLINIC | Age: 23
End: 2023-11-08
Payer: COMMERCIAL

## 2023-11-08 DIAGNOSIS — F41.9 ANXIETY: Primary | ICD-10-CM

## 2023-11-08 DIAGNOSIS — F60.3 BORDERLINE PERSONALITY DISORDER (H): ICD-10-CM

## 2023-11-08 DIAGNOSIS — F32.A DEPRESSION, UNSPECIFIED DEPRESSION TYPE: ICD-10-CM

## 2023-11-08 NOTE — TELEPHONE ENCOUNTER
----- Message from Alicia Mai sent at 11/8/2023  3:25 PM CST -----  Regarding: Referral for [facility]  Adult Mental Health Programmatic Care Schedule Request    Patient Name: Maria Alejandra Betts  Location of programming: [facility]  Start Date: 11/13/2023      Adult Program Group: Adult Program Group: PHP 2 Track [[id number]]  Schedule:  M-F 9am-3pm  Number of visits to be scheduled: 10 days    Attending Provider (MD):  Dr Igor Persaud.  Visit Type:  In-Person    Accommodations Needed: No  Alerts Identified/Substantiation: No  Consulted with Supervisor: No

## 2023-11-08 NOTE — TELEPHONE ENCOUNTER
Reviewed External Referral sent from [facility]. They are recommending a higher level of care due to increase in symptoms. Current DA was sent as well as progress notes with recommendation documented of PHP level of care.     Patient will remain in IOP at [facility] until able to start PHP. Per documentation they have given her crisis resources to utilize if needed.       LOCUS Worksheet     Name: Maria Alejandra Betts MRN: [id number]    : 2000      Gender:  female    PMI:     Provider Name: Corinne Romitti   Provider NPI:      Actual level of Care Provided:  Currently in IOP    Service(s) receiving or referred to:  PHP    Reason for Variance: N/A      Rating completed by: Corinne Romitti      I. Risk of Harm:   3      Moderate Risk of Harm    II. Functional Status:   4      Serious Impairment    III. Co-Morbidity:   2      Minor Co-Morbidity    IV - A. Recovery Environment - Level of Stress:   3      Moderately Stress Environment    IV - B. Recovery Environment - Level of Support:   3      Limited Support in Environment    V. Treatment and Recovery History:   4      Poor Response to Treatment and Recovery Management    VI. Engagement and Recovery Project:   2      Positive Engagement and Recovery       21 Composite Score    Level of Care Recommendation:   20 to 22       Medically Monitored Non-Residential Services

## 2023-11-08 NOTE — TELEPHONE ENCOUNTER
Summary of Patient Care Communication Handoff to Patient Navigator Coordinator    PATIENT'S NAME: Maria Alejandra Betts  MRN:   [id number]  :   2000    DATE OF SERVICE: 23    Adult Mental Health Referral    Level of Care Recommended:  Partial Hospitalization Program (PHP)    Specialty Track Recommendations:  MH Specialty Tracks: Trauma / Personality Disorder and Mood Disorder    Schedule Preferences: Schedule Preference:  No schedule preference (Mornings or Afternoons)    Are there any potential barriers for entrance into programmatic care? None known    Specialty Care Coordinator Referral Needed:  No    Mental Health Referral Needed: [facility]    Release of Information Needed:  No    Faxing Needed: No    Follow up Requests:  None    Comments: Referral to PHP    Corinne Romitti, LICSW        Patient Navigator Coordinator Contact Information  Pool Message: [email or web address] ([phone])   Phone:  [phone]  Fax:  [phone]  Email:  [email or web address]

## 2023-11-08 NOTE — TELEPHONE ENCOUNTER
**Patient Navigator Follow Up**    11/08/2023;    Referral for PHP    I called patient and left VM for patient to call back if interested to go over start date for PHP.    I added patient to the PHP wait list and program will follow up with patient accordingly.       11/08/2023; Patient returned my call to discuss PHP- she was not ready to commit yet. I offered to send her Welcome Letter through her Sibaritushart so she can review and call back accordingly with questions or to go over start date. Welcome Letter with TBD date was sent through her Sibaritushart.      Patient called me back direct this afternoon and looked over the welcome letter and wants to start PHP.  She is all set to start PHP on Monday, 11/13.    I added her to the census list (updated it) and sent in basket message to program since she has a start date now.    I sent her new Welcome Letter now with start date.      Thank you,    Alicia CHI  Patient Navigator

## 2023-11-10 ENCOUNTER — TELEPHONE (OUTPATIENT)
Dept: EMERGENCY MEDICINE | Facility: CLINIC | Age: 23
End: 2023-11-10
Payer: COMMERCIAL

## 2023-11-10 NOTE — PROGRESS NOTES
"    Admission SBAR NOTE  Adult  Outpatient Programs          SITUATION:     Admission Date: 11/10/2023    Provider verified identity through the following two step process.  Patient provided: verbal spelling of full first and last name and Patient     Patient name:  Maria Alejandra Betts  Preferred name: Maria Alejandra she/they 23 year old  Diagnosis/-es (copy from DA, including ICD-10):   Major depressive disorder, recurrent moderate    Assigned Program/Track: PHP-2    Reviewed patient's schedule and informed them of any variation due to holidays. Yes    Does the patient have any planned absences and/or barriers to admission/treatment? No  NOTE: impact of transportation, technology, childcare, work, or housing concerns.    Insurance: Payor: [facility] / Plan: [employer] [facility] / Product Type: HMO /  Changes/Concerns: No    Does patient need an appointment with the program provider?No  NOTE: If yes, please confirm/schedule provider visit.      BACKGROUND:     Patient's stated goal/reason for treatment: \"Be able to function despite mental illness. \"      ASSESSMENT:     Please consult  if any of the following concerns may impact admission/participation in program:     PHQ, BRUCE and PROMIS done within 7 days OR send upon admission if over 7 days.      [place] Suicide Severity Rating (select Lifetime/Recent): [place] Suicide Severity Rating Scale (Lifetime/Recent)      2022     6:27 PM   [place] Suicide Severity Rating (Lifetime/Recent)   Q1 Wished to be Dead (Past Month) yes   Q2 Suicidal Thoughts (Past Month) yes   Q3 Suicidal Thought Method no   Q4 Suicidal Intent without Specific Plan no   Q5 Suicide Intent with Specific Plan no   Q6 Suicide Behavior (Lifetime) no   Level of Risk per Screen low risk       LOCUS completed for recommended level of care? yes  IOP: 17-19; PHP: 20-22     Copy/Paste current Safety Plan to the BEH TX PLAN ENCOUNTER. no patient did not have a safety plan completed prior to admission, " however, this was completed by program staff today 11/13    Safety status/concerns: None at this time     Substance use concerns: yes, patient reports using nicotine, and would like to stop  NOTE: if no substance use concerns, OK to delete below:    Pertinent Medical/Nutritional concerns: Lack of appetite    Confirm Emergency Contact listed in the SnapShot/Demographics with patient and notify OBC if an update is required. Confirmed with patient. Verbalized that, in the event he cannot be reached by staff, we will reach out to his emergency contact.      Paper or Docusign requirements for ROIs, e-MARTHA, emergency contact, etc have been completed? No  If not, do upon admission.     Does patient have FMLA or Short-Term Disability requests/plans? No  NOTE: Whenever possible, FMLA or Short-Term Disability paperwork needs to be managed/completed by the patient's community provider.   Exceptions: Patient does not have a community provider AND request is specific to mental health and time off for the duration of the program participation.    Notify RN Triage as soon as possible.     Care Providers/Medication Management Needs:     Does patient have a current community or other [facility] provider prescribing medications for mental health? No, requested therapy and psychiatry referrals  NOTE: Delete below if not applicable:    RECOMMENDATIONS:     Patient Admission Completed: yes    Care Team, referrals made/needed: yes  PCP: Kenzie Walker  NOTE: Notify RN, as needed, to make internal referrals.                                                             Completed by: NINO Alamo

## 2023-11-10 NOTE — TELEPHONE ENCOUNTER
Reviewed new records received from [facility] on 11/10.     Patient was assessed and discharged by Haily BULLARD on 11/9 to [facility] level of care related to increasing panic with self injury and worsening depressed mood. Of note, patient is described as not at imminent risk for self-harm, does not meet criteria for a 72-hour hold, but was recommended for a higher level of care. The patient was provided with an after care plan including crisis recommendations and instructions to call 911 or the nearest ED if life threatening or urgent symptoms present. This patient was not personally examined or contacted by this clinician.     Contact with patient was attempted by the Navigation Hub team at 1600 on 11/10 related to confirmation and potential change of PHP start date. Patient was not able to be reached. Patient is anticipating PHP start on 11/13 per prior contact and [facility] discharge summary. Patient anticipated to start 11/13.    NINO Peacock, LICSW

## 2023-11-11 ENCOUNTER — TELEPHONE (OUTPATIENT)
Dept: BEHAVIORAL HEALTH | Facility: CLINIC | Age: 23
End: 2023-11-11
Payer: COMMERCIAL

## 2023-11-11 NOTE — TELEPHONE ENCOUNTER
Called pt/left a VM to inquire re: start date in PHP for Monday, 11/13/2023.  Provided writer's name and direct call back phone # for pt follow-up.    Luci ONEILL, LICSW

## 2023-11-13 ENCOUNTER — TELEPHONE (OUTPATIENT)
Dept: BEHAVIORAL HEALTH | Facility: CLINIC | Age: 23
End: 2023-11-13
Payer: COMMERCIAL

## 2023-11-13 ENCOUNTER — HOSPITAL ENCOUNTER (OUTPATIENT)
Dept: BEHAVIORAL HEALTH | Facility: CLINIC | Age: 23
Discharge: HOME | End: 2023-11-13
Attending: PSYCHIATRY & NEUROLOGY | Admitting: PSYCHIATRY & NEUROLOGY
Payer: COMMERCIAL

## 2023-11-13 ENCOUNTER — HOSPITAL ENCOUNTER (OUTPATIENT)
Dept: BEHAVIORAL HEALTH | Facility: CLINIC | Age: 23
Discharge: HOME | End: 2023-11-13
Attending: PSYCHIATRY & NEUROLOGY
Payer: COMMERCIAL

## 2023-11-13 VITALS
RESPIRATION RATE: 17 BRPM | BODY MASS INDEX: 21.67 KG/M2 | WEIGHT: 143 LBS | OXYGEN SATURATION: 100 % | HEIGHT: 68 IN | TEMPERATURE: 97.8 F | DIASTOLIC BLOOD PRESSURE: 83 MMHG | SYSTOLIC BLOOD PRESSURE: 126 MMHG | HEART RATE: 67 BPM

## 2023-11-13 DIAGNOSIS — F41.1 GENERALIZED ANXIETY DISORDER: ICD-10-CM

## 2023-11-13 DIAGNOSIS — F33.1 MAJOR DEPRESSIVE DISORDER, RECURRENT EPISODE, MODERATE (H): Primary | ICD-10-CM

## 2023-11-13 PROCEDURE — 99205 OFFICE O/P NEW HI 60 MIN: CPT | Performed by: PSYCHIATRY & NEUROLOGY

## 2023-11-13 PROCEDURE — H0035 MH PARTIAL HOSP TX UNDER 24H: HCPCS | Performed by: OCCUPATIONAL THERAPIST

## 2023-11-13 PROCEDURE — H0035 MH PARTIAL HOSP TX UNDER 24H: HCPCS | Performed by: COUNSELOR

## 2023-11-13 PROCEDURE — 99417 PROLNG OP E/M EACH 15 MIN: CPT | Performed by: PSYCHIATRY & NEUROLOGY

## 2023-11-13 RX ORDER — PROPRANOLOL HYDROCHLORIDE 10 MG/1
10 TABLET ORAL 2 TIMES DAILY PRN
Qty: 10 TABLET | Refills: 0 | Status: SHIPPED | OUTPATIENT
Start: 2023-11-13

## 2023-11-13 ASSESSMENT — ANXIETY QUESTIONNAIRES
1. FEELING NERVOUS, ANXIOUS, OR ON EDGE: MORE THAN HALF THE DAYS
7. FEELING AFRAID AS IF SOMETHING AWFUL MIGHT HAPPEN: SEVERAL DAYS
GAD7 TOTAL SCORE: 11
GAD7 TOTAL SCORE: 11
IF YOU CHECKED OFF ANY PROBLEMS ON THIS QUESTIONNAIRE, HOW DIFFICULT HAVE THESE PROBLEMS MADE IT FOR YOU TO DO YOUR WORK, TAKE CARE OF THINGS AT HOME, OR GET ALONG WITH OTHER PEOPLE: VERY DIFFICULT
5. BEING SO RESTLESS THAT IT IS HARD TO SIT STILL: MORE THAN HALF THE DAYS
6. BECOMING EASILY ANNOYED OR IRRITABLE: SEVERAL DAYS
2. NOT BEING ABLE TO STOP OR CONTROL WORRYING: MORE THAN HALF THE DAYS
3. WORRYING TOO MUCH ABOUT DIFFERENT THINGS: MORE THAN HALF THE DAYS

## 2023-11-13 ASSESSMENT — PATIENT HEALTH QUESTIONNAIRE - PHQ9
SUM OF ALL RESPONSES TO PHQ QUESTIONS 1-9: 21
5. POOR APPETITE OR OVEREATING: SEVERAL DAYS

## 2023-11-13 ASSESSMENT — PAIN SCALES - GENERAL: PAINLEVEL: NO PAIN (0)

## 2023-11-13 NOTE — PROGRESS NOTES
"RN Review of Medical History / Physical Health Screen  Outpatient Mental Health Programs - [facility] Adult Partial Hospitalization Program    PATIENT'S NAME: Maria Alejandra Betts  Preferred name: Maria Alejandra   23 year old  MRN:   [id number]  :   2000  ACCT. NUMBER: [id number]  CURRENT AGE:  23 year old    DATE OF DIAGNOSTIC ASSESSMENT: 2023  DATE OF ADMISSION: 2023     Please see Diagnostic Assessment for additional Medical History.     General Health:   Have you had any exposure to any communicable disease in the past 2-3 weeks? no     Do you have a history of seizures?     If so, do you have a seizure plan? Known triggers?     Notify patient that we will call 911 (if virtual) or a code (if in-person), if we were to witness seizure during group. no    no  no    yes     Nutrition:    Are you on a special diet? If yes, please explain:  no   Do you have any concerns regarding your nutritional status? If yes, please explain:  no   Have you had any appetite changes in the last 3 months?  Yes, lack of appetite     Have you had any weight loss or weight gain in the last 3 months?  Yes, how much? Patient has lost over 20 pounds.            Height/Weight Review:  Patient reported height:  5'8\"      Patient reports weight:  Date last checked:  143 lb   23       Patient height and weight recorded by RN in epic flowsheet: No; Unable to measure  Programmatic Care currently provided via telehealth. All pt weights and heights will be collected through patient self-report and recorded in physical health screening progress note upon admission to the program.      BMI Review:  Was the patient informed of BMI? No.     Findings Normal, No Intervention         Fall Risk:   Have you had any falls in the past 3 months? no     Do you currently use any assistive devices for mobility?   no      Does the patient have medication concerns? no     Was an MTM referral placed? no      Does the patient have any acute " or chronic pain concerns that might impact participation in the program? no       Additional Comments/Assessment: Bright/euthymic    Per completion of the Medical History / Physical Health Screen, is there a recommendation to see / follow up with a primary care physician/clinic or dentist?    Yes, Recommendations:   physical exam          Amena Negrete RN  11/13/2023
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
## 2023-11-13 NOTE — GROUP NOTE
Process Group Note    PATIENT'S NAME: Maria Alejandra Betts  MRN:   [id number]  :   2000  ACCT. NUMBER: [id number]  DATE OF SERVICE: 23  START TIME:  9:00 AM  END TIME:  9:50 AM  FACILITATOR: Josh So LMFT  TOPIC:  Process Group    Diagnoses:  Major depressive disorder, recurrent moderate     [facility] Adult Partial Hospitalization Program  TRACK: PHP1 and PHP2 combined    NUMBER OF PARTICIPANTS: 7        Data:    Session content: At the start of this group, patients were invited to check in by identifying themselves, describing their current emotional status, and identifying issues to address in this group.   Area(s) of treatment focus addressed in this session included Symptom Management, Personal Safety, and Community Resources/Discharge Planning.  Patient reported feeling curious, and a little bit hopeless and discouraged.   Patient discussed working toward manage emotions better to maintain relationships.   For skills they will use to address their goal(s), patient identified getting comfortable setting boundaries.   A barrier to working toward their goal(s) and/or addressing mental health symptoms the patient identified was negative self-talk.    Patient reported passive SI  coming and going.  Si is passive and she is committed to safety.  She will be making safety plan today.  Sometimes she listens to music to get through the SI.  Patient reported marijuana and alcohol use. Patient reported they are taking their medications as prescribed.   Patient reported feeling proud/grateful for showing up to this group.    Therapeutic Interventions/Treatment Strategies:  Psychotherapist offered support, feedback and validation. Treatment modalities used include Cognitive Behavioral Therapy. Interventions include Coping Skills: Facilitated understanding of what factors may contribute to symptom relapse and skills plan to manage symptom relapse and Symptoms Management: Promoted understanding of  their diagnoses and how it impacts their functioning.    Assessment:    Patient response:   Patient responded to session by accepting feedback, listening, focusing on goals, being attentive and accepting support    Possible barriers to participation / learning include:  Negative self-talk    Health Issues:   None reported       Substance Use Review:   Substance Use: Patient reported marijuana and alcohol use.     Mental Status/Behavioral Observations  Appearance:   Appropriate   Eye Contact:   Good   Psychomotor Behavior: Normal   Attitude:   Cooperative   Orientation:   All  Speech   Rate / Production: Normal    Volume:  Normal   Mood:    Anxious Depressed  Affect:    Subdued  Thought Content:   Clear and Safety reports recent presence of suicidal ideation passive suicidal ideation  Thought Form:  Coherent  Logical     Insight:    Good     Plan:   Safety Plan: Committed to safety.  She will meet with PHP staff today to complete her safety plan.  Recommended that patient call 911 or go to the local ED should there be a change in any of these risk factors.  Barriers to treatment: None identified  Patient Contracts (see media tab):  None  Substance Use: Provided encouragement towards sobriety   Continue or Discharge: Patient will continue in Adult Partial Hospitalization Program (PHP)  as planned. Patient is likely to benefit from learning and using skills as they work toward the goals identified in their treatment plan.      Josh So, BRIGIDA  November 13, 2023

## 2023-11-13 NOTE — GROUP NOTE
Psychoeducation Group Note    PATIENT'S NAME: Maria Alejandra Betts  MRN:   [id number]  :   2000  ACCT. NUMBER: [id number]  DATE OF SERVICE: 23  START TIME: 11:00 AM  END TIME: 11:50 AM  FACILITATOR: Bushra Wilcox OTR  TOPIC: [facility] OT Group: Lifestyle Balance and Structure  [facility] Adult Partial Hospitalization Program  TRACK: PHP 1 & 2     NUMBER OF PARTICIPANTS: 6    Summary of Group / Topics Discussed:  Lifestyle Balance and Structure (Self-care):  Facilitated collaborative discussion about self-care including why it is important, barriers to engaging in it, and examples of current self-care practice. Brainstormed with the group to develop a list of self-care activities that meet the needs of one's mind, body, or spirit.  Educated on the importance of approaching self-care with self-compassion and moe.  Discussed possible benefits of engaging in self-care as well as potential barriers.   Instructed group members to select one or two self-care activities they would like to explore in the coming days and provided an opportunity to brainstorm strategies for success.      Patient Session Goals / Objectives:  Reflected on self-care benefits and barriers through a group brainstorming process.   Identified areas of self-care that are a current priority and set realistic goals for these areas.  Identify strategies and skills to meet specific needs and address barriers.  Reflected on their current self-care engagement and shared insight about their progress.      Patient Participation / Response:  Fully participated with the group by sharing personal reflections / insights.  Affect was appropriate to situation.  Demonstrated understanding of content through structured skill practice and verbal participation in group discussion. Shared one self care activity in which they plan to engage in the coming days is going to the library.     Treatment Plan:  Patient has an initial individualized treatment  plan that was created as part of their diagnostic assessment / admission process.  A master individualized treatment plan is in the process of being developed with the patient and multi-disciplinary care team.    Bushra Wilcox, OTR

## 2023-11-13 NOTE — GROUP NOTE
Psychotherapy Group Note    PATIENT'S NAME: Maria Alejandra Betts  MRN:   [id number]  :   2000  ACCT. NUMBER: [id number]  DATE OF SERVICE: 23  START TIME:  2:00 PM  END TIME:  2:50 PM  FACILITATOR: Alicia Waters LPCC  TOPIC: MH EBP Group: Emotions Management  [facility] Adult Partial Hospitalization Program  TRACK: PHP1 and PHP2 merged    NUMBER OF PARTICIPANTS: 7    Summary of Group / Topics Discussed:  Emotions Management: Opposite to Emotion: Patients discussed past and present struggles with knowing how to make changes in their lives due to difficult emotional experiences.  Explored desires to experience and feel less anger, sadness, guilt, and fear.  Reviewed the therapeutic skill of opposite action and patients explored opportunities to use their behaviors as a tool to reduce an emotion that they want to change.     Patient Session Goals / Objectives:  Review DBT concepts and focus on patient s experiences of distress and difficult emotional experiences.  Learn how to do the opposite of what an emotion makes us want to do in an effort to decrease an unwanted emotional experience.  Demonstrate understanding of the skill of opposite action by sharing experiences where the technique could be useful in past / present situations.      Patient Participation / Response:  Moderately participated, sharing some personal reflections / insights and adequately adequately received / provided feedback with other participants.    Demonstrated understanding of topics discussed through group discussion and participation, Expressed understanding of the relevance / importance of emotions management skills at distressing times in life, Self-aware of experiences with difficult emotions, and strategies to employ to manage them, Demonstrated knowledge of when to consider applying a variety of emotions management skills in daily life, Identified barriers to applying emotions management strategies, and Identified /  Expressed personal readiness to practice new emotions management skills    Treatment Plan:  Patient has an initial individualized treatment plan that was created as part of their diagnostic assessment / admission process.  A master individualized treatment plan is in the process of being developed with the patient and multi-disciplinary care team.    Alicia Waters, [facility]

## 2023-11-13 NOTE — PROGRESS NOTES
"[facility] Adult Partial Hospitalization Program                                       Maria Alejnadra Betts     SAFETY PLAN:  Step 1: Warning signs / cues (Thoughts, images, mood, situation, behavior) that a crisis may be developing:  Thoughts: \"I just want this to end\" and \"people don't understand me,\" feeling like a burden  Images: flashbacks  Thinking Processes: ruminations (can't stop thinking about my problems): not being good enough, things I've done that have negatively impacted people and highly critical and negative thoughts: being stuck on where relationships went wrong, being unable to 'forget' about it  Mood: worsening depression and loneliness, irritability, migraines  Behaviors: isolating/withdrawing , using drugs, and not taking care of my responsibilities  Situations:  conflict, feeling abandoned, not good enough, like a burden    Step 2: Coping strategies - Things I can do to take my mind off of my problems without contacting another person (relaxation technique, physical activity):  Distress Tolerance Strategies:  relaxation activities: singing, change body temperature (ice pack/cold water) , and listening to music  Physical Activities: go for a walk and get outside  Focus on helpful thoughts:   'be where your feet are, not where your mind is at,' 'worrying is misuse of the imagination,' 'what was good for me today doesn't have to be good for me tomorrow.' Remind myself that I am safe and it's okay to have feelings.   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, my baby sister  Step 5: When I am in crisis, I can ask these people to help me use my safety plan:   Name: " SisterZulema  Phone: In Phone   Name: Friend, Richard  Phone: In Phone  Step 6: Making the environment safe:   remove things I could use to hurt myself:   and be around others  Step 7: Professionals or agencies I can contact during a crisis:  Suicide Prevention Lifeline: Call or Text 949   Local Crisis Services:    [facility] COPE: In [facility]: [phone]   [place] [address] [address]   Text '[place]'  to [phone]     Call 911 or go to my nearest emergency department.   I helped develop this safety plan and agree to use it when needed.  I have been given a copy of this plan.      Client signature _________________________________________________________________  Today s date:  11/13/2023  Completed by Provider Name/ Credentials:  Alicia Waters [facility]  November 13, 2023  Adapted from Safety Plan Template 2008 Alka Pineda and Carson Beltran is reprinted with the express permission of the authors.  No portion of the Safety Plan Template may be reproduced without the express, written permission.  You can contact the authors at [email or web address] or [email or web address].

## 2023-11-13 NOTE — TELEPHONE ENCOUNTER
----- Message from Robles Evans sent at 11/13/2023  8:18 AM CST -----  Regarding: FW: PHP start  Please make appts for pt starting today.     Patient Name: Maria Alejandra Betts  Location of programming: [facility]  Start Date: 11/13/2023      Adult Program Group: Adult Program Group: PHP 2 Track [[id number]]  Schedule:  M-F 9am-3pm  Number of visits to be scheduled: 10 days    Attending Provider (MD):  Dr Igor Persaud.  Visit Type:  In-Person    ----- Message -----  From: Luci Hull MSW  Sent: 11/10/2023   3:02 PM CST  To: Alicia Waters [facility]; #  Subject: Delay in PHP start                               Hello!    Pt's chart has incomplete documentation prior to PHP start, so start is delayed at this time.  I will send another message when start date is determined.    Thank you!  Luci ONEILL [facility]    ----- Message -----  From: Luci Hull MSW  Sent: 11/10/2023  11:12 AM CST  To: [facility]; LOUIS Fox; #  Subject: PHP 2 start on 11/13                               ----- Message -----  From: Alicia Mai  Sent: 11/8/2023   3:27 PM CST  To: [facility]enie Marshall [facility]; Isabel Griffith [facility]; #  Subject: Referral for PHP                                 Adult Mental Health Programmatic Care Schedule Request    Patient Name: Maria Alejandra Betts  Location of programming: [facility]  Start Date: 11/13/2023      Adult Program Group: Adult Program Group: PHP 2 Track [[id number]]  Schedule:  M-F 9am-3pm  Number of visits to be scheduled: 10 days    Attending Provider (MD):  Dr Igor Persaud.  Visit Type:  In-Person    Accommodations Needed: No  Alerts Identified/Substantiation: No  Consulted with Supervisor: No

## 2023-11-14 ENCOUNTER — HOSPITAL ENCOUNTER (OUTPATIENT)
Dept: BEHAVIORAL HEALTH | Facility: CLINIC | Age: 23
Discharge: HOME | End: 2023-11-14
Attending: PSYCHIATRY & NEUROLOGY
Payer: COMMERCIAL

## 2023-11-14 PROCEDURE — H0035 MH PARTIAL HOSP TX UNDER 24H: HCPCS | Performed by: OCCUPATIONAL THERAPIST

## 2023-11-14 PROCEDURE — H0035 MH PARTIAL HOSP TX UNDER 24H: HCPCS | Performed by: COUNSELOR

## 2023-11-14 PROCEDURE — H0035 MH PARTIAL HOSP TX UNDER 24H: HCPCS

## 2023-11-14 NOTE — GROUP NOTE
Psychotherapy Group Note    PATIENT'S NAME: Maria Alejandra Betts  MRN:   [id number]  :   2000  ACCT. NUMBER: [id number]  DATE OF SERVICE: 23  START TIME: 10:00 AM  END TIME: 10:50 AM  FACILITATOR: Alicia Waters LPCC  TOPIC: MH EBP Group: Self-Awareness  [facility] Adult Partial Hospitalization Program  TRACK: PHP1 and PHP2 merged    NUMBER OF PARTICIPANTS: 6    Summary of Group / Topics Discussed:  Self-Awareness: Values: Patients identified personal values by examining development of their current values and how their values influence their daily functioning and life choices. Patients explored the impact of their values on their thoughts, feelings, and actions. Patients discussed definition of personal values and how they develop and change over time. The goal is to help patients reconcile value conflicts and achieve balance and flexibility to improve mood and daily functioning.     Patient Session Goals / Objectives:  Examined development of values and impact of values on functioning  Identified and prioritized important values related to current well-being   Identified strategies to change or enhance values to positively impact symptoms  Assisted patients to find ways to adapt functioning to better fit their values      Patient Participation / Response:  Fully participated with the group by sharing personal reflections / insights and openly received / provided feedback with other participants.    Demonstrated understanding of topics discussed through group discussion and participation, Demonstrated understanding of values, strengths, and challenges to learn about themselves, Verbalized understanding of ways to proactively manage illness, Identified plan to address barriers to practicing skills that promote self-awareness , and Practiced skills in session    Pt took process time to discuss events leading up to recent crisis, relationship with mother, and pattern of quickly entering  relationships in order to avoid discomfort of being alone. Pt was receptive to feedback and support from the group.     Treatment Plan:  Patient has an initial individualized treatment plan that was created as part of their diagnostic assessment / admission process.  A master individualized treatment plan is in the process of being developed with the patient and multi-disciplinary care team.    Alicia Waters [facility]

## 2023-11-14 NOTE — GROUP NOTE
Psychoeducation Group Note    PATIENT'S NAME: Maria Alejandra Betts  MRN:   [id number]  :   2000  ACCT. NUMBER: [id number]  DATE OF SERVICE: 23  START TIME:  1:00 PM  END TIME:  1:50 PM  FACILITATOR: Belén Herring RN  TOPIC:  Wellness Group: Mental Health Maintenance  [facility] Adult Partial Hospitalization Program  TRACK: [facility]     NUMBER OF PARTICIPANTS: 6    Summary of Group / Topics Discussed:  Mental Health Maintenance:  Assessments of Strengths: Patients completed a self-reflection on personal strengths worksheet. The concept of personal strength as it relates to resilience were explored. Patients shared responses with the group and participated in discussion.     Patient Session Goals / Objectives:  Patients identified 1-3 qualities they consider a personal strength.  Patients understood the concept of personal strengths and the connection it has to resiliency  Patients understood 3 core concepts of self compassion and completed an exercise/ meditation      Patient Participation / Response:  Fully participated with the group by sharing personal reflections / insights and openly received / provided feedback with other participants.    Identified / Expressed personal readiness to practice skills    Treatment Plan:  Patient has a current master individualized treatment plan.  See Epic treatment plan for more information.    Belén Herring RN

## 2023-11-14 NOTE — GROUP NOTE
Psychotherapy Group Note    PATIENT'S NAME: Maria Alejandra Betts  MRN:   [id number]  :   2000  ACCT. NUMBER: [id number]  DATE OF SERVICE: 23  START TIME:  2:00 PM  END TIME:  2:50 PM  FACILITATOR: Josh So LMFT; Alicia Waters LPCC  TOPIC: MH EBP Group: [facility] Adult Partial Hospitalization Program  TRACK: PHP1 and PHP2 merged    NUMBER OF PARTICIPANTS: 6    Summary of Group / Topics Discussed:  Education Support Network:  Patients and caregivers received an overview of diagnoses including physical, intellectual, and behavioral indicators of illness. Patients presented information created in the support network development group to engage caregivers in planning for help and support to manage mental health symptoms.  The goal is to help patients and caregivers create a plan for support and assistance after discharge.      Patient Session Goals / Objectives:  Understanding diagnoses and accompanying physical reactions, thoughts, and behaviors.    Explored options to support patients in their recovery   Formulated a plan with caregivers for assistance and support to aid in recovery   Problem solved barriers to follow through on plan      Patient Participation / Response:  Fully participated with the group by sharing personal reflections / insights and openly received / provided feedback with other participants.    Demonstrated understanding of topics discussed through group discussion and participation, Identified / Expressed readiness to act on skill suggestions discussed in topic, Verbalized understanding of ways to proactively manage illness, and Practiced skills in session  Mom joined by video  Treatment Plan:  Patient has an initial individualized treatment plan that was created as part of their diagnostic assessment / admission process.  A master individualized treatment plan is in the process of being developed with the patient and multi-disciplinary care  team.    Josh So LMFT

## 2023-11-14 NOTE — GROUP NOTE
OT Clinic Group Note    PATIENT'S NAME: Maria Alejandra Betts  MRN:   [id number]  :   2000  ACCT. NUMBER: [id number]  DATE OF SERVICE: 23  START TIME: 11:00 AM  END TIME: 11:50 AM  FACILITATOR: Bushra Wilcox OTR  TOPIC: MH PHP OT Group: Occupational Therapy Clinic  [facility] Adult Partial Hospitalization Program  TRACK: PHP 1 & 2    NUMBER OF PARTICIPANTS: 6    Summary of Group / Topics Discussed:  Occupational Therapy Clinic & Behavioral Activation:  Introduced the clinic group as a time to practice the skill of behavioral activation.  Discussions included reviewing the benefits and barriers to making behavioral changes, taking small steps, increasing motivation, and decreasing procrastination.  Provided opportunity for patients to independently choose and engage in a therapeutic or productive activity that supports progress towards their goals and psychiatric recovery. Provided a context for patients to monitor their symptoms, gain self-awareness, practice skills (behavior activation, self-regulation, mindfulness) and build a sense of self efficacy and mastery.  OT observed, assessed, and monitored performance skills and patterns in context with each group member. Patients were then prompted to identify how the skills practiced in group carry over into every day life.     Patient Session Goals / Objectives:  Independently identify a purposeful and meaningful therapeutic activity.  Identified which goal(s) they are intentionally working on during session.   Practiced the skill of behavior activation  Reflected on their performance and share insight about their progress.  Practiced and identified a way to generalize a skill to their everyday life      Patient Participation / Response:  Fully participated with the group by sharing personal reflections / insights.  Asked thoughtful questions regarding behavioral activation education.  Affect was appropriate to situation.  Demonstrated understanding of  content through structured skill practice and verbal participation in group discussion.  Utilized clinic time to engage in creative expression task.      Treatment Plan:  Patient has an initial individualized treatment plan that was created as part of their diagnostic assessment / admission process.  A master individualized treatment plan is in the process of being developed with the patient and multi-disciplinary care team.    Bushra Wilcox, OTR

## 2023-11-14 NOTE — GROUP NOTE
Process Group Note    PATIENT'S NAME: Maria Alejandra Betts  MRN:   [id number]  :   2000  ACCT. NUMBER: [id number]  DATE OF SERVICE: 23  START TIME:  9:00 AM  END TIME:  9:50 AM  FACILITATOR: Michael Victor LGSW  TOPIC:  Process Group    Diagnoses:  Major depressive disorder, recurrent moderate      [facility] Adult Partial Hospitalization Program  TRACK: PHP 1 and PHP 2 merged    NUMBER OF PARTICIPANTS: 6        Data:    Session content: At the start of this group, patients were invited to check in by identifying themselves, describing their current emotional status, and identifying issues to address in this group.   Area(s) of treatment focus addressed in this session included Symptom Management, Personal Safety, and Community Resources/Discharge Planning.    How are you feeling today? Patient reported feeling  really tired      What goals are you working on? Patient discussed working towards emotion regulation, setting boundaries in relationships, working towards employment     What skills will you use towards your goal? Pt identified the following skills: listening to music, affirmations, self-care (ADL s)     What barriers might you encounter towards those goal(s)? Pt identified the following barrier(s): lack of motivation     Since last check-in: safety concerns, SI, SIB, chemical use? Pt reported no safety concerns, no SI, and no SIB. Pt reported no substance use.      Are you taking medications? Pt reported they are taking their medications as prescribed.      What are you proud/grateful for? Pt reported feeling proud/grateful for being present at programming.     Process time? Pt discussed the following: Pt commented on being in-person at groups and the importance of accountability. Pt reflected on lack of motivation and how she s evaluating what strategies could help her in overcoming this. Pt spoke at length re: schooling and how she s reflecting on next steps in relation to this.      Therapeutic Interventions/Treatment Strategies:  Psychotherapist offered support, feedback and validation and reinforced use of skills. Treatment modalities used include Motivational Interviewing, Cognitive Behavioral Therapy, and Dialectical Behavioral Therapy. Interventions include Behavioral Activation: Explored how behaviors effect mood and interact with thoughts and feelings, Mindfulness: Facilitated discussion of when/how to use mindfulness skills to benefit general health, mental health symptoms, and stressors, Symptoms Management: Promoted understanding of their diagnoses and how it impacts their functioning, and Relationship Skills: Encouraged development and maintenance  of healthy boundaries.    Assessment:    Patient response:   Patient responded to session by accepting feedback, listening, focusing on goals, being attentive, accepting support, appearing alert, and verbalizing understanding    Possible barriers to participation / learning include: severity of symptoms    Health Issues:   None reported       Substance Use Review:   Substance Use: No active concerns identified.    Mental Status/Behavioral Observations  Appearance:   Appropriate   Eye Contact:   Good   Psychomotor Behavior: Normal   Attitude:   Cooperative  Friendly Pleasant  Orientation:   All  Speech   Rate / Production: Normal    Volume:  Normal   Mood:    Normal  Affect:    Appropriate  Lethargic   Thought Content:   Clear  Thought Form:  Coherent  Logical     Insight:    Good     Plan:   Safety Plan: No current safety concerns identified.  Recommended that patient call 911 or go to the local ED should there be a change in any of these risk factors.   Barriers to treatment: None identified  Patient Contracts (see media tab):  None  Substance Use: Not addressed in session   Continue or Discharge: Patient will continue in Adult Partial Hospitalization Program (PHP)  as planned. Patient is likely to benefit from learning and using  skills as they work toward the goals identified in their treatment plan.      Michael Victor, RADHASW  November 14, 2023

## 2023-11-14 NOTE — GROUP NOTE
Psychotherapy Group Note    PATIENT'S NAME: Maria Alejandra Betts  MRN:   [id number]  :   2000  ACCT. NUMBER: [id number]  DATE OF SERVICE: 23   START TIME:  1:00 PM  END TIME:  1:50 PM  FACILITATOR: Josh So LMFT  TOPIC:  EBP Group: Emotions Management  [facility] Adult Partial Hospitalization Program  TRACK: PHP1 and PHP2 merged    NUMBER OF PARTICIPANTS: 7    Summary of Group / Topics Discussed:  Emotions Management: Model of Emotions: Patients were introduced to the cyclical model of emotions.  Explored emotions are shaped by different events and one s interpretation of events.  Group discussed how emotions begin with an event, followed by one s interpretation, followed by associated feelings.  Discussion included a review of personal urges and actions that can/do follow an emotional experience in the patient s life, and the end results.    Patient Session Goals / Objectives:  Demonstrate understanding of types various emotions.  Identify and discuss specific emotions and when they occur; understand triggers.  Identify individual emotions and physical sensations that accompany them.  Discuss urges and actions, and how to influence the intensity of emotional reactions and disrupt the cycle.    Discuss barriers to emotional regulation.  Choose 1-2 strategies to assist with emotional response to potentially distressing situations.      Patient Participation / Response:  Fully participated with the group by sharing personal reflections / insights and openly received / provided feedback with other participants.    Demonstrated understanding of topics discussed through group discussion and participation, Expressed understanding of the relevance / importance of emotions management skills at distressing times in life, and Identified / Expressed personal readiness to practice new emotions management skills    Treatment Plan:  Patient has an initial individualized treatment plan that was created as  part of their diagnostic assessment / admission process.  A master individualized treatment plan is in the process of being developed with the patient and multi-disciplinary care team.    Josh So LMFT

## 2023-11-15 ENCOUNTER — HOSPITAL ENCOUNTER (OUTPATIENT)
Dept: BEHAVIORAL HEALTH | Facility: CLINIC | Age: 23
Discharge: HOME | End: 2023-11-15
Attending: PSYCHIATRY & NEUROLOGY
Payer: COMMERCIAL

## 2023-11-15 PROCEDURE — H0035 MH PARTIAL HOSP TX UNDER 24H: HCPCS | Performed by: COUNSELOR

## 2023-11-15 PROCEDURE — H0035 MH PARTIAL HOSP TX UNDER 24H: HCPCS

## 2023-11-15 PROCEDURE — H0035 MH PARTIAL HOSP TX UNDER 24H: HCPCS | Performed by: OCCUPATIONAL THERAPIST

## 2023-11-15 NOTE — GROUP NOTE
Psychoeducation Group Note    PATIENT'S NAME: Maria Alejandra Betts  MRN:   [id number]  :   2000  ACCT. NUMBER: [id number]  DATE OF SERVICE: 11/15/23  START TIME:  2:00 PM  END TIME:  2:50 PM  FACILITATOR: Amena Negrete RN  TOPIC: MH Life Skills Group: Resiliency Development  [facility] Partial Hospitalization Program  TRACK: PHP-2    NUMBER OF PARTICIPANTS: 4    Summary of Group / Topics Discussed:  Resiliency Development:  Coping Skills: Patients were taught how to identify stressors, signs of stress, coping skills, and prevention strategies for overall stress management.  Patients were given the opportunity to identify both ongoing and acute mental health symptoms and how to effectively manage these symptoms by developing an effective aftercare plan.  Patients increased awareness of community based resources.    Patient Session Goals / Objectives:  Identified how using coping skills can be used for symptom and stress management     Improved awareness of individualed symptoms and stressors and how to effectively cope   Established a relapse prevention plan to practice these skills in their own environments  Practiced and reflected on how to generalize taught skills to their everyday life      Patient Participation / Response:  Moderately participated, sharing some personal reflections / insights and adequately adequately received / provided feedback with other participants.    Verbalized understanding of content    Treatment Plan:  Patient has a current master individualized treatment plan.  See Epic treatment plan for more information.    Amena Negrete RN

## 2023-11-15 NOTE — PROGRESS NOTES
"   Partial Hospitalization Program  Occupational Therapy Evaluation     Patient: Maria Alejandra Betts  Preferred Name: Maria Alejandra  Pronouns:  she/they   MRN: [id number]  : 2000  Age: 23 year old  Date of Occupational Therapy Evaluation: 2023  Program start date: 2023  Anticipated discharge: 2023  Assessment format: Observation of patient, Form, Chart review, Consultation with team members      Medical and Therapy History  Mental Health Diagnosis:  (Per most recent H&P)   1. Major depressive disorder, recurrent episode, moderate (H)  F33.1         2. Generalized anxiety disorder  F41.1 propranolol (INDERAL) 10 MG tablet          By history, traumatic brain injury, borderline personality disorder  Have not ruled out 300.01 (F41.0) Panic Disorder  Safety, Substance Use, Trauma History: As noted below or in diagnostic assessment   Presenting Concern: (Per most recent H&P) \"I needed some kind of support.\"     Occupational Participation   Occupational Context   Occupations and roles in which patient currently feel successful:  \"How I have been able to show up for myself\"   Components of a good life from patient's perspective: \"Being happy. Fulfilling my passions. Making a change in the world in some aspect\"   Self-identified barriers to engagement in occupations:  \"No motivation, negative self talk/ruminating thoughts, feeling no purpose\"  Patient also identifies current difficulties with: motivation, concentration, memory, self-compassion, self-confidence, structure and routine, sensory aversions or preferences.   Considerations of personal values, interests, culture:  \"I like to participate in protests/community organized events\"         Self-identified Occupational Performance Inventory  Area of Occupation Level of Importance   1-10 Current Performance   1-10 Motivation to Change   1-10 Additional Details    Self-Care: nutrition, eating, hygiene, medication, movement, mobility, sexual activity, sleep, " rest, spiritual/Mandaeism practice 8 5 8 [Patient left blank]     Home Management: care of people and pets, meal planning, cooking, cleaning, laundry, finances, paperwork, home maintenance 7 10 NA [Patient left blank]     Community Management: transportation, shopping, appointments, being in public, community groups and clubs 10 1 7 [Patient left blank]     Employment and Education:  working, applying for jobs, volunteering, school, homework   8 1 5 [Patient left blank]     Leisure: hobbies, sports, identifying interests, finding affordable activities   10 3 9 [Patient left blank]     Social Participation: building and maintaining relationships, asserting needs, boundaries, socializing 8 4 9 [Patient left blank]       Plan of Care:   Please refer to multidisciplinary treatment plan for additional information regarding plan of care.     Clinical Occupational Summary:  Patient is a 23 year old who uses she/they pronouns and is diagnosed with depression and anxiety. Patient has been an active participant in occupational therapy group thus far. Patient reports highest levels of importance and motivation to change in the areas of self care, leisure, and social participation.  Based on the information patient provided, patient is currently demonstrating impairment in the occupation categories of ADLs, IADLs, leisure and social participation.  Patient would benefit from skilled occupational therapy services to progress functional status and increase participation in meaningful activities.  Treatment diagnosis: Ineffective coping and decline in functional status.   Occupational Therapy Goal(s):  Please refer to individualized treatment plan for multidisciplinary team goals.     Skilled Occupational Therapy Interventions: Including but not limited to:  Evaluation, goal setting, ongoing assessment, treatment planning, discharge planning.  Treatment groups: Facilitation and group cohesion development.  Psychoeducation and  Experiential groups focusing on: Self-awareness & self-expression, lifestyle health, neurosensory applications/interventions, mindfulness, motivation, energy management, meaningful & purposeful intervention activities, self-regulation skill development, self-compassion and resiliency development, therapeutic use of self, community resources.  Medical Necessity:  Low  complexity   CPT codes & descriptors Occupational Profile and  Medical/Therapy History Assessment of Occupational Performance Clinical   Decision Making   Overall Level       Low Complexity (52590) Brief history relating to presenting  Problem   Problem-focused. 1-3 performance  deficits relating to  physical, cognitive, psychosocial  limitations/restrictions Low complexity, limited  amount of treatment options,  no assessment modification,  no co-morbidities     X  X   x Moderate Complexity (06959)   Expanded review of therapy/  medical records. Additional  review of physical, cognitive,  psychosocial performance Detailed. 3-5 performance  deficits relating to physical,  cognitive, psychosocial limitations/  restrictions Moderate analytical complexity,  detailed assessments,  minimal to moderate modification  of assessments, may have  comorbidities.      X     High Complexity (03484)   Extensive review of physical,  cognitive, psychosocial performance Comprehensive, 5 or more  performance deficits relating  to physical, cognitive, and  psychosocial limitations/restrictions. High analytical complexity,  comprehensive assessments,  multiple treatment options,  significant modifications of  assessment, comorbidities  affecting performance.            Would patient benefit from skilled Occupational Therapy Intervention to work on the above goals?  Yes    Patients potential for improvement towards goals: good     Signature: Shelby Richter, INGACIAR

## 2023-11-15 NOTE — GROUP NOTE
Psychoeducation Group Note    PATIENT'S NAME: Maria Alejandra Betts  MRN:   [id number]  :   2000  ACCT. NUMBER: [id number]  DATE OF SERVICE: 11/15/23  START TIME: 10:00 AM  END TIME: 10:50 AM  FACILITATOR: Michael Victor LGSW  TOPIC:  Wellness Group: [facility] Adult Partial Hospitalization Program  TRACK: PHP 2    NUMBER OF PARTICIPANTS: 4    Summary of Group / Topics Discussed:  [facility]:  [facility]: Anatomy of Trust: Evaluated the Table of Trust and the four qualities associated with positive, trusting relationships.    The group took the beginning of group to process.    Patient Session Goals / Objectives:  Discussed the Table of Trust and how the four qualities of trust are present in relationships of participants  Participants discussed ways in which trust, or lack of trust impacts mental health recovery  Explored some ways trust is built/re-built        Patient Participation / Response:  Fully participated with the group by sharing personal reflections / insights and openly received / provided feedback with other participants.    Demonstrated understanding of topics discussed through group discussion and participation and Identified / Expressed personal readiness to practice skills    Pt participated and provided feedback to relevant to their experiences.    Treatment Plan:  Patient has an initial individualized treatment plan that was created as part of their diagnostic assessment / admission process.  A master individualized treatment plan is in the process of being developed with the patient and multi-disciplinary care team.    DELTA Gray

## 2023-11-15 NOTE — GROUP NOTE
Process Group Note    PATIENT'S NAME: Maria Alejandra Betts  MRN:   [id number]  :   2000  ACCT. NUMBER: [id number]  DATE OF SERVICE: 11/15/23  START TIME:  9:00 AM  END TIME:  9:50 AM  FACILITATOR: Michael Victor LGSW  TOPIC:  Process Group    Diagnoses:  Major depressive disorder, recurrent moderate       [facility] Adult Partial Hospitalization Program  TRACK: PHP 2    NUMBER OF PARTICIPANTS: 5        Data:    Session content: At the start of this group, patients were invited to check in by identifying themselves, describing their current emotional status, and identifying issues to address in this group.   Area(s) of treatment focus addressed in this session included Symptom Management, Personal Safety, and Community Resources/Discharge Planning.    How are you feeling today? Patient reported feeling  optimistic      What goals are you working on? Patient discussed working towards emotion regulation, stabilization     What skills will you use towards your goal? Pt identified the following skills: self-compassion     What barriers might you encounter towards those goal(s)? Pt identified the following barrier(s): lack of motivation, hopelessness     Since last check-in: safety concerns, SI, SIB, chemical use? Pt reported no safety concerns, no SI, and no SIB. Pt reported marijuana use.     Are you taking medications? Pt reported they are taking their medications as prescribed.      What are you proud/grateful for? Pt reported feeling proud/grateful for  group.     Process time? Pt discussed the following: Pt reflected on  group and how they re happy their mother was able to attend. Pt commented on her relationship w/their mother re: navigating difficulties. Pt reflected on personal insight and how it can create barriers in terms of addressing things that can feel overwhelming once identified.     Therapeutic Interventions/Treatment Strategies:  Psychotherapist offered support,  "feedback and validation and reinforced use of skills. Treatment modalities used include Motivational Interviewing, Cognitive Behavioral Therapy, and Dialectical Behavioral Therapy. Interventions include Mindfulness: Facilitated discussion of when/how to use mindfulness skills to benefit general health, mental health symptoms, and stressors, Symptoms Management: Promoted understanding of their diagnoses and how it impacts their functioning, and Relationship Skills: Assisted patients in implementing more effective communication skills in their relationships and Discussed strategies to promote healthier understanding of interpersonal relationships.    Assessment:    Patient response:   Patient responded to session by accepting feedback, giving feedback, listening, focusing on goals, being attentive, accepting support, appearing alert, and verbalizing understanding    Possible barriers to participation / learning include: severity of symptoms    Health Issues:   None reported       Substance Use Review:   Substance Use: No active concerns identified.    Mental Status/Behavioral Observations  Appearance:   Appropriate   Eye Contact:   Good   Psychomotor Behavior: Normal   Attitude:   Cooperative  Interested Friendly Pleasant Attentive  Orientation:   All  Speech   Rate / Production: Normal    Volume:  Normal   Mood:    Normal \"Optimistic\"  Affect:    Appropriate   Thought Content:   Clear  Thought Form:  Coherent  Logical     Insight:    Good     Plan:   Safety Plan: No current safety concerns identified.  Recommended that patient call 911 or go to the local ED should there be a change in any of these risk factors.   Barriers to treatment: None identified  Patient Contracts (see media tab):  None  Substance Use: Not addressed in session   Continue or Discharge: Patient will continue in Adult Partial Hospitalization Program (PHP)  as planned. Patient is likely to benefit from learning and using skills as they work toward " the goals identified in their treatment plan.      DELTA Gray  November 15, 2023

## 2023-11-15 NOTE — PROGRESS NOTES
"   Individualized Treatment Plan       PATIENT'S NAME: Maria Alejandra Betts   MRN:   [id number]  :   2000    LEVEL OF CARE/ PROGRAM PARTICIPATION:     Program Participation: Adult Partial Hospitalization Program.  Track: PHP 2. Frequency: 5 days per week, 5 hours per day. Anticipated duration: 2-3 weeks. Maria Alejandra Betts  would be at reasonable risk of requiring inpatient hospitalization in the absence of partial hospitalization.     Program Admission Date: 23     Program Anticipated Discharge Date: 23     Date of Treatment Plan: 23    Primary Diagnosis:Per H&P  1. Major depressive disorder, recurrent episode, moderate (H)  F33.1         2. Generalized anxiety disorder  F41.1 propranolol (INDERAL) 10 MG tablet          By history, traumatic brain injury, borderline personality disorder  Have not ruled out 300.01 (F41.0) Panic Disorder      Multidisciplinary Team Members: Igor Persaud MD; Josh So LMFT; Alicia Waters [facility]; Kimberlee Herring RN; Joyce Casiano RN, PHN; Bushra Wilcox, OTR/L; Argenis Richter, OTR/L; Massiel Nathan, OTR/L; Michael Victor, [facility]; Bruna Saleem, [facility], BC-DMT    Chief Complaint: The reason for seeking services at this time is: per H&P: \"I needed some kind of support.\"   Long Term Goal: Patients long-term stated goal for treatment: \"regulating my emotions and setting boundaries.\"     Patient Participation in Plan:   Patient did contribute to goals and plan. Patient does  agree with plan. Patient did receive a copy of treatment plan. The treatment team will not have contact with patient's family or other supports regarding treatment planning because the patient  does not want to include family or other supports in treatment planning at this time.    NOTE: Patient will need to sign Informed Consent / Release of Information prior to any contact.     Patient Strengths:   empathetic, goal-focused, good listener, has a previous history of therapy, insightful, intelligent, " "motivated, open to learning, open to suggestions / feedback, supportive, wants to learn, willing to ask questions, and willing to relate to others    Patient Limitations:  Suicidal Ideation   Anxiety  Depressive symptoms       Abuse Prevention Plan:   Treatment team will provide education regarding skill development to address symptom management, life skills, wellness, discharge planning, and personal safety.  Collaborate with patients internal and external providers to coordinate care.  Treatment will be provided in a safe, therapeutic environment.  Program provider will offer medication adjustment/management as required.      Discharge Criteria:  Patient will discharge from program when it is determined that factors leading to admission have been addressed to the extent that the patient can be safely transitioned to a less intensive level of care. See discharge goals/plan below.     Treatment Focus:    Problem Description: PER H&P:    Suicidal ideation: has passive suicidal thoughts described as \"not any specific plans or anything\"  Thoughts of non-suicidal self-injury: denies at time of interview   Recent self-injurious behavior: denied for past 2 months  Homicidal ideation: denied  Other safety concerns: denied    Area of Treatment Focus: Personal Safety  Patient Identified Goal: \"Feeling okay about safety. The past couple of weeks I've been having thoughts that if things ended it might be better\". Pt expressed feeling like their thoughts of SI come on \"out of nowhere\", however they are confident that they know when it is time to seek help. She is comfortable using her safety plan, and reaching out to staff in regards to safety as needed. Pt also expressed feeling like her support system, specifically her mom, doesn't take her mental health safety concerns seriously.   Measurable Goal: Patient will learn and apply coping strategies to manage suicidal or self-injurious behavior urges. Patient will use and/or " "develop their individualized coping plan for safety and notify staff of all safety concerns.  Goal Status: Stopped  Intervention Strategies: Staff will:   Assist in identifying and applying coping skills.  Assist in identifying and problem solving barriers.  Assist clients in establishing / strengthening support network.  Assist to identify treatment goals.  Assist with discharge planning.  Engage in safety planning when indicated.  Facilitate increased self-awareness.  Provide education regarding distress tolerance skills.  Goal Start Date: 11/16/2023  Target Dates: 11/22/2023, 11/27/2023     Review Date: 11/21/2023   Progress Update: Maria Alejandra reported some suicidal thoughts coming up over the weekend but reported \"It didn't feel as intense as it did on Thursday and Friday\" \"I was able to bring myself back quicker.\"  They has some urges to hit themselves in the head for self-injury but didn't act on it.  To get through the thoughts of suicide or self-harm, Maria Alejandra has been using music, cold water or a shower.  She thinks about the reasons she wants to be here, to get through the suicidal thoughts.  She continues to commit to safety with following her safety plan.  Goal continued.    Discharge Date: 11/27/2023  Progress Update: When asked about how Maria Alejandra feels about safety, they responded \"Good\". Patient reports that are comfortable talking to staff if safety concerns change. Patient has a copy of their safety plan and feels comfortable using it. Patient is committed to safety.     Problem Description: PER H&P  Sleep: \"A little better\"  Tracking it  Not waking up as much  Often wakes into panic symptoms   \"I still get those moments where I get anxious and it's hard to calm it down\"  When asked, will have panic-level symptoms sometimes without provocation and sometimes as a consequence of increasing anxiety over time   Area of Treatment Focus: Symptom Management  Patient Identified Goal: \"Ruminating thoughts is the " "main one, and negative self-talk. I've been trying to set a goal in my head of self-compassion and being more intentional with that.\"  Measurable Goal: Patient will practice skills to improve rumination and challenge negative self-talk as measured by self report and staff observation in groups daily.  Goal Status: Stopped   Intervention Strategies: Staff will: Assist clients in establishing / strengthening support network.  Assist to identify treatment goals.  Assist with discharge planning.  Engage in safety planning when indicated.  Facilitate increased self awareness.  Provide education regarding how to use group process, thoughts/behaviors/emotions management, emotional regulation, values identification, distorted thinking, grief and loss, shame, self compassion, self awareness, mindfulness, radical acceptance, distress tolerance skills, hope, problem solving. Communication/interpersonal effectiveness.   Goal Start Date: 11/16/2023  Target Dates: 11/22/2023, 11/27/2023     Review Date: 11/21/2023   Progress Update: For dealing with negative thoughts Maria Alejandra reported \"I'm trying to remind myself of 3 affirmations that I found.\"  \"I definitely want to dive into self-compassion and use different approaches.   Maria Alejandra is having more worry with getting closer to [facility] graduation.  Maria Alejandra will continue to learn new coping skills for ruminating and find more thing she can do on her own for self-care.  Goal continued.    Discharge Date: 11/27/2023  Progress Update: \"I am okay. I have been feeling a lot of different things.\" Patient reports they feel anxious/overwhelmed because of the last day in [facility]. Patient reports they feel \"positive and proud\" and how in the past they have needed validation from others, but today they feel proud of themselves in \"the first time in a long time\". Patient was tearful when thinking about their growth.        Problem Description: Per H&P   Hygiene: no concerns expressed " "today  Socialization: see above  Concerns related to work: see above  Area of Treatment Focus: Life Skills / Independent Living Skills  Patient Identified Goal: \"I haven't really been able to enjoy my hobbies as much. I like painting and writing but those don't really feel doable to me right now. Also applying to jobs, I want to start doing that too.\"  Measurable Goal: Patient will learn, practice, and apply at least 2 skills/strategies that support participation in meaningful activities with emphasis on leisure and work to improve mental health management after discharge as measured by self report and staff observation in groups.   Goal Status: Stopped   Intervention Strategies: Staff will: Assist to identify treatment goals.  Engage in safety planning when indicated.  Facilitate increased self awareness.  Complete OT assessment. Provide education regarding:balance/structure/routine, goal setting and integration, prioritizing and planning, leisure values, behavior activation, motivation, energy management, stress management, neuroscience of change, sensory modulation, window of tolerance, ANS and vagus nerve activation, self awareness, sensory enhanced mindfulness, sensory/body based grounding skills.  Goal Start Date: 11/16/2023  Target Dates: 11/22/2023, 11/27/2023     Review Date: 11/21/2023   Progress Update:  For working on independent living skills, Maria Alejandra reported \"I've been pushing myself to do some art when I get home.\"  She has been looking for different ideas for art work she can do.  She is thinking about what kind of job setting will be the best for them.  Goal continued.    Discharge Date: 11/27/2023  Progress Update: Maria Alejandra reported feeling proud to finish an art project recently. They reported they are starting to feel more polly out of tasks now. Patient used the skill of breaking things up to complete the task and wants to continue that with other areas of her life.  Patient reports feeling more " "intentional with their time. Patient wants to reflect on their future regarding school and work.       Problem Description: Current Outpatient Psychiatric Provider: Maria Alejandra will reach out to [facility] to reconnect with previous psychiatrist for medication follow up.  Current Outpatient Individual Psychotherapist: Ami Hopkins at [facility]  Next Visit:  Ami is on Maternity leave.  Maria Alejandra would like to look into a therapist with an EMDR specialist.  A referral has been made for you for an individual therapist.  [facility] will call you to coordinate your care as prescribed by your provider. If you don't hear from a representative within 2 business days, please call [phone].   Primary Care Provider: Kenzie Walker      Area of Treatment Focus: Community Resources/Discharge Plan  Patient Identified Goal: \"I'm not really sure what would be helpful yet. I do want to start working again, but I am also thinking about starting another program. I want to make sure I'm feeling good enough before I go back to working. Maybe a less intense DBT program that doesn't take up as much of my time.\". Pt expressed interest in IOP after discharging from [facility]. They also expressed an interest in a referral for an EMDR therapist to address trauma history.   Measurable Goal: Patient will develop an aftercare / transition plan by discharge.  Patient will improve wellness related behaviors by attending wellness sessions and ask questions as they come up.  Patient will increase effective use of support / increase ability to ask for help. Patient will invite someone to the Support Network Education group to discuss support needs as able and appropriate.   Goal Status: Stopped   Intervention Strategies: Staff will: Assist to identify treatment goals.  Assist with discharge planning.  Engage in safety planning when indicated.  Facilitate increased self-awareness.  Provide education regarding: eight " "dimensions of wellness, sleep hygiene, medication education and management, stigma, nutrition, signs and symptoms, community resources,  support network education.   Goal Start Date: 11/16/2023  Target Dates: 11/22/2023, 11/27/2023     Review Date: 11/21/2023   Progress Update: Maria Alejandra would like to start in an IOP group as soon as possible after completing PHP.  Maria Alejandra prefers AM IOP but is open to an afternoon group if needed.  Maria Alejandra is thinking about working part-time while doing IOP.    Discharge Date: 11/27/2023  Progress Update: Patient is joining IOP-DT 5 starting 11/28. Patient is meeting with an art therapist every Friday if available. Will continue to discuss discharge needs when joining IOP.       Review Date: Does Maria Alejandra Betts continue to meet criteria to participate in the program?   11/16/23 yes   11/21/23 Yes  BRIGIDA Velazquez on 11/21/2023 at 2:28 PM   11/27/23 yes LOUIS Smith/BENIGNO on 11/27/2023 at 1:40 PM               Acknowledgement of Current Treatment Plan       I have reviewed my treatment plan with my treatment team member (s) on 11/21/23.   I agree with the plan as it is written in the electronic health record.     Name:                   Signature:                                                          Date:  Maria Alejandra Betts       Igor Persaud MD  Psychiatrist/Medical Director         NOTE: Patient signatures are completed manually and scanned into the electronic medical record. See \"Media\" tab in epic.      I have reviewed the patient's Individualized Treatment Plan and agree with the current goals, interventions and level of care.     Igor Persaud MD  11/16/2023, 11/22/2023, 11/27/2023  "

## 2023-11-15 NOTE — GROUP NOTE
Psychoeducation Group Note    PATIENT'S NAME: Maria Alejandra Betts  MRN:   [id number]  :   2000  ACCT. NUMBER: [id number]  DATE OF SERVICE: 11/15/23  START TIME: 11:00 AM  END TIME: 11:50 AM  FACILITATOR: Bushra Wilcox OTR  TOPIC: MH PHP OT Group: Self- Regulation Skills  [facility] Partial Hospitalization Program  TRACK: PHP2    NUMBER OF PARTICIPANTS: 4    Summary of Group / Topics Discussed:  Self-Regulation Skills: Coping Through the Senses Introduction: Patients were educated on neurosensory skills to promote effective self-regulation.  Discussed the eight sensory systems (external and internal) and how they can be used for coping with mental health symptoms and stressors.  Patients were provided with an experiential opportunity to explore sensory tools with the goal of increasing self-awareness of helpful strategies and personal sensory preferences.  Patients were instructed on how to create supportive environments that encourage use of these skills.     Patient Session Goals / Objectives:  Review/learn the 8 sensory systems and how they relate to self-regulation  Identified specific and individualized neurosensory skills to help when distressed    Identified skills learned and how this applies to current daily life  Established a plan for practice of these skills in their own environments      Patient Participation / Response:  Fully participated with the group by sharing personal reflections / insights and openly received / provided feedback with other participants.  Affect was appropriate to situation.  Demonstrated understanding of content through structured skill practice and verbal participation in group discussion.  Shared they have been using oral motor strategies such as jolly ranchers with success recently.      Treatment Plan:  Patient has an initial individualized treatment plan that was created as part of their diagnostic assessment / admission process.  A master individualized  treatment plan is in the process of being developed with the patient and multi-disciplinary care team.    Bushra Wilcox, IGNACIAR

## 2023-11-16 ENCOUNTER — HOSPITAL ENCOUNTER (OUTPATIENT)
Dept: BEHAVIORAL HEALTH | Facility: CLINIC | Age: 23
Discharge: HOME | End: 2023-11-16
Attending: PSYCHIATRY & NEUROLOGY
Payer: COMMERCIAL

## 2023-11-16 PROCEDURE — H0035 MH PARTIAL HOSP TX UNDER 24H: HCPCS

## 2023-11-16 PROCEDURE — H0035 MH PARTIAL HOSP TX UNDER 24H: HCPCS | Performed by: COUNSELOR

## 2023-11-16 NOTE — GROUP NOTE
Process Group Note    PATIENT'S NAME: Maria Alejandra Betts  MRN:   [id number]  :   2000  ACCT. NUMBER: [id number]  DATE OF SERVICE: 23  START TIME:  9:00 AM  END TIME:  9:50 AM  FACILITATOR: Michael Victor LGSW  TOPIC:  Process Group    Diagnoses:  Major depressive disorder, recurrent moderate        [facility] Adult Partial Hospitalization Program  TRACK: PHP 2    NUMBER OF PARTICIPANTS: 4        Data:    Session content: At the start of this group, patients were invited to check in by identifying themselves, describing their current emotional status, and identifying issues to address in this group.   Area(s) of treatment focus addressed in this session included Symptom Management, Personal Safety, and Community Resources/Discharge Planning.    How are you feeling today? Patient reported feeling  tired      What goals are you working on? Patient discussed working towards emotion regulation, boundary setting. Intentionality in actions/behaviors.     What skills will you use towards your goal? Pt identified the following skills: self-compassion, affirmations.     What barriers might you encounter towards those goal(s)? Pt identified the following barrier(s): Inaction towards goals.     Since last check-in: safety concerns, SI, SIB, chemical use? Pt reported no safety concerns, no SI, and no SIB. Pt reported nicotine and marijuana use.      Are you taking medications? Pt reported they are taking their medications as prescribed.      What are you proud/grateful for? Pt reported feeling proud/grateful for being present at programming.     Process time? Pt discussed the following: Pt stated that they have the onset of a migraine today. Pt mentioned that they experience struggles re: taking medications b/c of forgetfulness. Pt stated an openness to medication management. Pt mentioned that she s focused on not engaging in social media and other distractions that typically allow Pt to avoid goal oriented  "behavior.     Therapeutic Interventions/Treatment Strategies:  Psychotherapist offered support, feedback and validation and reinforced use of skills. Treatment modalities used include Motivational Interviewing, Cognitive Behavioral Therapy, and Dialectical Behavioral Therapy. Interventions include Behavioral Activation: Reinforced benefits/challenges of change process through applying skills to replace unwanted behaviors and Explored how behaviors effect mood and interact with thoughts and feelings, Mindfulness: Facilitated discussion of when/how to use mindfulness skills to benefit general health, mental health symptoms, and stressors, and Emotions Management:  Discussed barriers to emotional regulation.    Assessment:    Patient response:   Patient responded to session by accepting feedback, giving feedback, listening, focusing on goals, being attentive, accepting support, appearing alert, and verbalizing understanding    Possible barriers to participation / learning include: severity of symptoms    Health Issues:   None reported       Substance Use Review:   Substance Use: No active concerns identified.    Mental Status/Behavioral Observations  Appearance:   Appropriate   Eye Contact:   Good   Psychomotor Behavior: Normal   Attitude:   Cooperative  Friendly  Orientation:   All  Speech   Rate / Production: Normal    Volume:  Normal   Mood:    Normal \"Tired\"  Affect:    Appropriate  Flat   Thought Content:   Clear  Thought Form:  Coherent  Logical     Insight:    Good     Plan:   Safety Plan: No current safety concerns identified.  Recommended that patient call 911 or go to the local ED should there be a change in any of these risk factors.   Barriers to treatment: None identified  Patient Contracts (see media tab):  None  Substance Use: Not addressed in session   Continue or Discharge: Patient will continue in Adult Partial Hospitalization Program (PHP)  as planned. Patient is likely to benefit from learning and " using skills as they work toward the goals identified in their treatment plan.      Michael Victor, RADHASW  November 16, 2023

## 2023-11-16 NOTE — GROUP NOTE
Psychotherapy Group Note    PATIENT'S NAME: Maria Alejandra Betts  MRN:   [id number]  :   2000  ACCT. NUMBER: [id number]  DATE OF SERVICE: 23  START TIME:  2:00 PM  END TIME:  2:50 PM  FACILITATOR: Alicia Waters LPCC  TOPIC: MH EBP Group: Relationship Skills  [facility] Adult Partial Hospitalization Program  TRACK: PHP1 and PHP2 merged    NUMBER OF PARTICIPANTS: 5    Summary of Group / Topics Discussed:  Relationship Skills: Boundaries: Patients were provided with a general overview of interpersonal boundaries and how lack of boundaries relates to symptoms and functioning. The purpose is to help patients identify boundary issues and gain awareness and skills to work towards healthier interpersonal boundaries. Current awareness of healthy boundary characteristics and barriers to establishing healthy boundaries were discussed.    Patient Session Goals / Objectives:  Familiarized patients with the concept of interpersonal boundaries and their characteristics  Discussed and practiced strategies to promote healthier interpersonal boundaries  Identified boundary issues and identified plan to improve boundaries      Patient Participation / Response:  Fully participated with the group by sharing personal reflections / insights and openly received / provided feedback with other participants.    Demonstrated understanding of topics discussed through group discussion and participation, Demonstrated understanding of relationship skills and communication skills, and Verbalized understanding of communication skills, communication challenges, and communication strengths    Treatment Plan:  Patient has a current master individualized treatment plan.  See Epic treatment plan for more information.    CLEMENT Calderon

## 2023-11-16 NOTE — GROUP NOTE
Psychoeducation Group Note    PATIENT'S NAME: Maria Alejandra Betts  MRN:   [id number]  :   2000  ACCT. NUMBER: [id number]  DATE OF SERVICE: 23  START TIME:  1:00 PM  END TIME:  1:50 PM  FACILITATOR: Belén Herring RN  TOPIC:  Wellness Group: [facility] Adult Partial Hospitalization Program  TRACK: [facility]     NUMBER OF PARTICIPANTS: 7    Summary of Group / Topics Discussed:  Foundations of Health: Nutrition: Super Nutrients & Micronutrients: Super Nutrients are Foods that have high nutritional yield. Micronutrients are essential elements found in food or taken through supplements that are necessary for normal physiological functioning. This group was designed to complement the macronutrients group and build upon previous education. The changes that food makes on the brain (how the brain uses sugar) and nutrition as it relates to mental health was also discussed.       Patient Session Goals / Objectives:  Identified the health enhancing benefits to good nutrition  Verbalized ways in which the food we eat affects the brain  Explained the role of micronutrients in the body, how much we need, and how to get it      Patient Participation / Response:  Fully participated with the group by sharing personal reflections / insights and openly received / provided feedback with other participants.    Identified / Expressed personal readiness to practice skills    Treatment Plan:  Patient has a current master individualized treatment plan.  See Epic treatment plan for more information.    Belén Herring RN

## 2023-11-16 NOTE — GROUP NOTE
Psychotherapy Group Note    PATIENT'S NAME: Maria Alejandra Betts  MRN:   [id number]  :   2000  ACCT. NUMBER: [id number]  DATE OF SERVICE: 23  START TIME: 10:00 AM  END TIME: 10:50 AM  FACILITATOR: Josh So LMFT  TOPIC:  EBP Group: Self-Awareness  [facility] Adult Partial Hospitalization Program  TRACK: PHP1 and PHP2 combined    NUMBER OF PARTICIPANTS: 8    Summary of Group / Topics Discussed:  Self-Awareness: Grief: Patients were provided with an overview of how personal losses impact their thoughts, feelings, and behaviors. Different stages of grief were discussed, with a focus on the personal and individual experiences of grief as a natural response to loss. The relationship between grief, depression, and anxiety was also discussed. Patients were provided with information regarding different ways of processing grief and shared their personal experiences.     Patient Session Goals / Objectives:  Defined and explored the concept of grief and the grieving process  Discussed relationship between grief, depression, and anxiety   Normalized and recognized the purpose/benefits of the grieving process  Discussed management of the thoughts and feelings associated with grief      Patient Participation / Response:  Fully participated with the group by sharing personal reflections / insights and openly received / provided feedback with other participants.  Maria Alejandra was tearful and talked about the loss of a relationship and related changes in their life.  Demonstrated understanding of topics discussed through group discussion and participation, Demonstrated understanding of values, strengths, and challenges to learn about themselves, Identified / Expressed readiness to act intentionally, increase self-compassion, promote personal growth, Verbalized understanding of ways to proactively manage illness, and Practiced skills in session    Treatment Plan:  Patient has an initial individualized treatment  plan that was created as part of their diagnostic assessment / admission process.  A master individualized treatment plan is in the process of being developed with the patient and multi-disciplinary care team.    Josh So LMFT

## 2023-11-17 ENCOUNTER — HOSPITAL ENCOUNTER (OUTPATIENT)
Dept: BEHAVIORAL HEALTH | Facility: CLINIC | Age: 23
Discharge: HOME | End: 2023-11-17
Attending: PSYCHIATRY & NEUROLOGY
Payer: COMMERCIAL

## 2023-11-17 PROCEDURE — H0035 MH PARTIAL HOSP TX UNDER 24H: HCPCS | Performed by: SOCIAL WORKER

## 2023-11-17 PROCEDURE — H0035 MH PARTIAL HOSP TX UNDER 24H: HCPCS

## 2023-11-17 PROCEDURE — H0035 MH PARTIAL HOSP TX UNDER 24H: HCPCS | Performed by: COUNSELOR

## 2023-11-17 NOTE — GROUP NOTE
Psychoeducation Group Note    PATIENT'S NAME: Maria Alejandra Betts  MRN:   [id number]  :   2000  ACCT. NUMBER: [id number]  DATE OF SERVICE: 23  START TIME:  2:00 PM  END TIME:  2:50 PM  FACILITATOR: Massiel Nathan OT  TOPIC: [facility] OT Group: Lifestyle Balance and Structure  [facility] Adult Partial Hospitalization Program  TRACK: [facility] 1&2    NUMBER OF PARTICIPANTS: 7    Summary of Group / Topics Discussed:  Occupational Therapy Clinic & Behavioral Activation:  Introduced the clinic group as a time to practice the skill of behavioral activation.  Discussions included reviewing the benefits and barriers to making behavioral changes, taking small steps, increasing motivation, and decreasing procrastination.  Provided opportunity for patients to independently choose and engage in a therapeutic or productive activity that supports progress towards their goals and psychiatric recovery. Provided a context for patients to monitor their symptoms, gain self-awareness, practice skills (behavior activation, self-regulation, mindfulness) and build a sense of self efficacy and mastery.  OT observed, assessed, and monitored performance skills and patterns in context with each group member. Patients were then prompted to identify how the skills practiced in group carry over into every day life.       Patient Session Goals / Objectives:  Independently identify a purposeful and meaningful therapeutic activity.  Identified which goal(s) they are intentionally working on during session.   Practiced the skill of behavior activation  Reflected on their performance and share insight about their progress.  Practiced and identified a way to generalize a skill to their everyday life      Patient Participation / Response:  Fully participated with the group by sharing personal reflections / insights. Writer discussed the theme of having a life balance between self-care, productivity, and leisure as an introduction to the  group, and allotted time to engage in an activity related to one of these life areas via the skill of behavioral activation. Pt opted to engage in a leisure activity with a few other peers during the time in group. Affect was appropriate to situation and mood congruent. Demonstrated understanding of content through structured skill practice and verbal participation in group discussion.     Treatment Plan:  Patient has a current master individualized treatment plan.  See Epic treatment plan for more information.    Massiel Nathan, OT

## 2023-11-17 NOTE — GROUP NOTE
Psychoeducation Group Note    PATIENT'S NAME: Maria Alejandra Betts  MRN:   [id number]  :   2000  ACCT. NUMBER: [id number]  DATE OF SERVICE: 23  START TIME: 11:00 AM  END TIME: 11:50 AM  FACILITATOR: Shelby Richter OTR  TOPIC: [facility] OT Group: Lifestyle Balance and Structure  [facility] Adult Partial Hospitalization Program  TRACK: PHP 1 and 2 (merged)    NUMBER OF PARTICIPANTS: 5    Summary of Group / Topics Discussed:  Lifestyle Balance and Structure:  Weekend Planning: To support structure and routine during the weekend, the group members were led through a facilitated discussion on the importance of structure and routine for overall mental wellness and to promote leisure, productivity, and self care during unstructured time. Patients discussed the importance of life balance and identified at least 1 self care activity, leisure activity, and productivity activity that they want to do this weekend to improve structure/routine. Patients independently created a weekend plan and shared with the group their personal plans. Patients identified potential barriers to their plan and skills that will help overcome the barriers. Validation, support, and feedback was provided throughout group process.          Patient Session Goals / Objectives:      Reflected on and created a weekend plan      Discuss the importance of life balance, structure, and routine      Identified and planned 3 activities in the areas of leisure, productivity, and self care for the weekend.      Identified and problem solved barriers to achieving identified plans        Patient Participation / Response:  Fully participated with the group by sharing personal reflections / insights and openly received / provided feedback with other participants.  Demonstrated understanding of content through completion of activity and self reflecting on importance of structure and routine.  Patient independently identified a self care activity (practicing  self compassion), productive activity (look for jobs), and leisure activity (sing/create art) that they want to complete this weekend. Patient self reflected on potential barriers to their weekend and problem solved skills they could use.      Treatment Plan:  Patient has a current master individualized treatment plan.  See Epic treatment plan for more information.    Shelby Richter, OTR

## 2023-11-17 NOTE — GROUP NOTE
Psychotherapy Group Note    PATIENT'S NAME: Maria Alejandra Betts  MRN:   [id number]  :   2000  ACCT. NUMBER: [id number]  DATE OF SERVICE: 23  START TIME:  1:00 PM  END TIME:  1:50 PM  FACILITATOR: Bruna Saleem LICSW  TOPIC: MH EBP Group: Enhanced Mindfulness  [facility] Adult Partial Hospitalization Program  TRACK: 1 and 2 merged    NUMBER OF PARTICIPANTS: 6    Summary of Group / Topics Discussed:  Enhanced Mindfulness: Body and Mind Integration: Patients received an overview and education regarding the importance of including the body in the management of emotional health and self-care and as a direct route to mindfulness practice.  Patients discussed various ways in which the body can serve as an informant to their physical and emotional experiences/need. Patients discussed the body as a direct link to the present moment and to mindfulness practice.  Patients discussed current relationship with body, self-awareness, mindfulness practice and barriers to connection with body.  Patients were guided through breath work and movement to facilitate greater self-awareness, grounding, self-expression, and connection to other.  Patients discussed how the experiential could be applied to better manage mental health and develop burkett connection to self.    Patient Session Goals / Objectives:  Identify how movement awareness could be used for grounding, stress management, self-expression, connection to other and self-regulation  Improved awareness of breath and movement preferences  Identify how movement and the body is used in mindfulness practice  Reflect on use of these practices in everyday life.  Identify barriers to attending to body      Patient Participation / Response:  Fully participated with the group by sharing personal reflections / insights and openly received / provided feedback with other participants.    Demonstrated understanding of topics discussed through group discussion and participation  and Practiced skills in session    Treatment Plan:  Patient has an initial individualized treatment plan that was created as part of their diagnostic assessment / admission process.  A master individualized treatment plan is in the process of being developed with the patient and multi-disciplinary care team.    ELLIE Burgess

## 2023-11-17 NOTE — GROUP NOTE
Psychotherapy Group Note    PATIENT'S NAME: Maria Alejandra Betts  MRN:   [id number]  :   2000  ACCT. NUMBER: [id number]  DATE OF SERVICE: 23  START TIME: 10:00 AM  END TIME: 10:50 AM  FACILITATOR: Alicia Waters LPCC  TOPIC:  EBP Group: Coping Skills  [facility] Adult Partial Hospitalization Program  TRACK: PHP1 and PHP2 merged    NUMBER OF PARTICIPANTS: 7    Summary of Group / Topics Discussed:  Coping Skills: Relapse Planning: Patients discussed and increased understanding of how anticipating and planning for possible increased symptoms is a proactive way to reduce the likelihood of relapse.  Patients shared individualized factors that may lead to increased symptoms and decompensation in functioning.  Each patient developed a relapse prevention plan designed to help them recognize when they may have increasing symptoms requiring them to take action and notify their key supports and care team.      Patient Session Goals / Objectives:  Identify and understand what factors may contribute to symptom relapse.  Identify actions that may be taken to manage increased symptoms/stressors.  Create an individualized written relapse plan  Problem solve barriers to plan creation and implementation  Share relapse plan with key support people      Patient Participation / Response:  Fully participated with the group by sharing personal reflections / insights and openly received / provided feedback with other participants.    Demonstrated understanding of topics discussed through group discussion and participation, Expressed understanding of the relevance / importance of coping skills at distressing times in life, Demonstrated knowledge of when to consider using a variety of coping skills in daily life, Identified / Expressed personal readiness to practice new coping skills, and Committed to using the following techniques in times of distress: self-soothe, distraction, and using support system    Treatment  Plan:  Patient has a current master individualized treatment plan.  See Epic treatment plan for more information.    Alicia Waters LPCC

## 2023-11-17 NOTE — GROUP NOTE
Process Group Note    PATIENT'S NAME: Maria Alejandra Betts  MRN:   [id number]  :   2000  ACCT. NUMBER: [id number]  DATE OF SERVICE: 23  START TIME:  9:00 AM  END TIME:  9:50 AM  FACILITATOR: Michael Victor LGSW  TOPIC:  Process Group    Diagnoses:  Major depressive disorder, recurrent moderate     [facility] Adult Partial Hospitalization Program  TRACK: [facility] 2    NUMBER OF PARTICIPANTS: 4        Data:    Session content: At the start of this group, patients were invited to check in by identifying themselves, describing their current emotional status, and identifying issues to address in this group.   Area(s) of treatment focus addressed in this session included Symptom Management, Personal Safety, and Community Resources/Discharge Planning.    How are you feeling today? Patient reported feeling  a little depressed, fatigued , rumination.     What goals are you working on? Patient discussed working towards emotion regulation and setting boundaries.     What skills will you use towards your goal? Pt identified the following skills: self-compassion     What barriers might you encounter towards those goal(s)? Pt identified the following barrier(s): lack of motivation and  not seeing a purpose      Since last check-in: safety concerns, SI, SIB, chemical use? Pt reported no safety concerns, passive SI, and no SIB. Pt reported nicotine substance use.      Are you taking medications? Pt reported they are taking their medications as prescribed.      What are you proud/grateful for? Pt reported feeling proud/grateful for being present at programming.     Process time? Pt discussed the following: Pt reflected on coping mechanisms re: SI and how to stay grounded and safe. Pt reported that they re committed to safety and that they re finding it  hard to get out  of her  head . Pt mentioned that they went to bed last night and experienced intensified rumination that  came out of nowhere.  Pt reflected on a  relationship she was in and the ongoing grief associated w/losing that relationship. Pt received feedback from the group that Pt identified as helpful.     Therapeutic Interventions/Treatment Strategies:  Psychotherapist offered support, feedback and validation and reinforced use of skills. Treatment modalities used include Motivational Interviewing, Cognitive Behavioral Therapy, and Dialectical Behavioral Therapy. Interventions include Behavioral Activation: Reinforced benefits/challenges of change process through applying skills to replace unwanted behaviors and Explored how behaviors effect mood and interact with thoughts and feelings, Cognitive Restructuring:  Explored impact of ineffective thoughts / distortions on mood and activity and Facilitated recognition of the connection between negative thoughts and negative core beliefs, Mindfulness: Facilitated discussion of when/how to use mindfulness skills to benefit general health, mental health symptoms, and stressors, Symptoms Management: Promoted understanding of their diagnoses and how it impacts their functioning, Emotions Management:  Discussed barriers to emotional regulation and Reviewed opposite action skill, and Other: Assisted patient  in finding ways to adapt functioning in light of past traumatic experiences and Explored with patient how life transitions may impact mental health and functioning .    Assessment:    Patient response:   Patient responded to session by accepting feedback, giving feedback, listening, focusing on goals, being attentive, accepting support, appearing alert, and verbalizing understanding    Possible barriers to participation / learning include: severity of symptoms    Health Issues:   None reported       Substance Use Review:   Substance Use: No active concerns identified.    Mental Status/Behavioral Observations  Appearance:   Appropriate   Eye Contact:   Good   Psychomotor Behavior: Normal   Attitude:   Cooperative    Orientation:   All  Speech   Rate / Production: Emotional Normal    Volume:  Normal   Mood:    Depressed  Normal Sad  Grieving  Affect:    Appropriate  Tearful  Thought Content:   Clear, Rumination, and Safety reports  presence of suicidal ideation passive suicidal ideation   Thought Form:  Coherent  Logical     Insight:    Good     Plan:   Safety Plan: Committed to safety and agreed to follow previously developed safety coping plan.    Barriers to treatment: None identified  Patient Contracts (see media tab):  None  Substance Use: Not addressed in session   Continue or Discharge: Patient will continue in Adult Partial Hospitalization Program (PHP)  as planned. Patient is likely to benefit from learning and using skills as they work toward the goals identified in their treatment plan.      Michael Victor, DELTA  November 17, 2023

## 2023-11-20 ENCOUNTER — HOSPITAL ENCOUNTER (OUTPATIENT)
Dept: BEHAVIORAL HEALTH | Facility: CLINIC | Age: 23
Discharge: HOME | End: 2023-11-20
Attending: PSYCHIATRY & NEUROLOGY
Payer: COMMERCIAL

## 2023-11-20 PROCEDURE — H0035 MH PARTIAL HOSP TX UNDER 24H: HCPCS | Performed by: COUNSELOR

## 2023-11-20 PROCEDURE — H0035 MH PARTIAL HOSP TX UNDER 24H: HCPCS

## 2023-11-20 PROCEDURE — H0035 MH PARTIAL HOSP TX UNDER 24H: HCPCS | Performed by: OCCUPATIONAL THERAPIST

## 2023-11-20 NOTE — GROUP NOTE
Psychoeducation Group Note    PATIENT'S NAME: Maria Alejandra Betts  MRN:   [id number]  :   2000  ACCT. NUMBER: [id number]  DATE OF SERVICE: 23  START TIME:  2:00 PM  END TIME:  2:50 PM  FACILITATOR: Belén Herring RN  TOPIC:  Wellness Group: [facility] Adult Partial Hospitalization Program  TRACK: PHP2    NUMBER OF PARTICIPANTS: 6    Summary of Group / Topics Discussed:  Foundations of Health: Sleep: Case study/sleep hygiene: Patients explored the connection between sleep and mental illness. Patients learned about how adequate sleep can improve health, productivity, wellness, quality of life, and safety.     Patient Session Goals / Objectives:  Demonstrated understanding of sleep hygiene practices and benefits of sleep  Identified sleep hygiene strategies to utilize   Described the connection between sleep disturbances and mental illness      Patient Participation / Response:  Fully participated with the group by sharing personal reflections / insights and openly received / provided feedback with other participants.    Identified / Expressed personal readiness to practice skills    Treatment Plan:  Patient has a current master individualized treatment plan.  See Epic treatment plan for more information.    Belén Herring RN

## 2023-11-20 NOTE — GROUP NOTE
OT Clinic Group Note    PATIENT'S NAME: Maria Alejanrda Betts  MRN:   [id number]  :   2000  ACCT. NUMBER: [id number]  DATE OF SERVICE: 23  START TIME: 11:00 AM  END TIME: 11:50 AM  FACILITATOR: Bushra Wilcox OTR  TOPIC: MH PHP OT Group: Occupational Therapy Clinic  [facility] Adult Partial Hospitalization Program  TRACK: PHP2    NUMBER OF PARTICIPANTS: 6    Summary of Group / Topics Discussed:  Occupational Therapy Clinic: Provided opportunity for patients to independently choose and engage in a therapeutic activity that supports progress towards their goals and psychiatric recovery. The Occupational Therapy Clinic provides a context for patients to monitor their symptoms, gain self-awareness, practice skills (self-regulation, mindfulness, self-talk, focus/concentration, social, productivity), build a sense of self efficacy and mastery, as well as receive validation, support, and resources. OT checks in, observes, assesses, and monitors performance skills and patterns in context with each group member. Patient completed the Occupational Self Assessment (CLAUDIA) in which identified functional areas their mental health status is impacting and which are most important for them to work on while in the Partial Hospitalization Program. Patient was provided orientation to the OT clinic and overview of purpose of the OT clinic in support of meeting their goals.     Patient Session Goals / Objectives:  Independently identify a purposeful and meaningful therapeutic activity.  Identified which goal(s) they are intentionally working on during session.   Reflected on their performance and share insight about their progress.  Practiced and identified a way to generalize a skill to their everyday life      Patient Participation / Response:  Fully participated with the group.  Affect was appropriate to situation. Demonstrated understanding of content through structured skill practice and verbal participation in group  discussion. Utilized clinic time to engage in creative expression task; demonstrated independent and detail-oriented work skills.     Treatment Plan:  Patient has a current master individualized treatment plan.  See Epic treatment plan for more information.    Bushra Wilcox, IGNACIAR

## 2023-11-20 NOTE — GROUP NOTE
Psychotherapy Group Note    PATIENT'S NAME: Maria Alejandra Betts  MRN:   [id number]  :   2000  ACCT. NUMBER: [id number]  DATE OF SERVICE: 23  START TIME:  1:00 PM  END TIME:  1:50 PM  FACILITATOR: Alicia Waters LPCC  TOPIC: MH EBP Group: Social Support  [facility] Adult Partial Hospitalization Program  TRACK: PHP2    NUMBER OF PARTICIPANTS: 7    Summary of Group / Topics Discussed:  Social Support:  Building and educating social support systems: The patients engaged in a discussion of how their mental health symptoms are related to psychosocial impairment. The patients also shared experiences of when stigma associated with mental illness has created barriers between them and their social network. The patients benefitted from this group by exploring ways in which they can re-engage their social network to decrease feelings of isolation and loneliness.    Patient Session Goals / Objectives:  Reduce overidentification/fusion with mental illness as being a primary definer of identity.   Identify ways that support network can assist with recovery.   Reduce stigma associated with mental health diagnosis(es).  Improve interpersonal communication regarding symptoms  Build a sense of mastery and self-respect      Patient Participation / Response:  Fully participated with the group by sharing personal reflections / insights and openly received / provided feedback with other participants.    Pt presented as talkative and engaged. They identified support needs and barriers to seeking out support. They reported they plan to possibly invite their dad to  group tomorrow.     Treatment Plan:  Patient has a current master individualized treatment plan.  See Epic treatment plan for more information.    CLEMENT Calderon

## 2023-11-20 NOTE — GROUP NOTE
After Visit Summary             Brigitte Segura   10/2/2017 1:15 PM   Office Visit    Description:  Female : 1981   Provider:  Marisa Noe CNP   Department:  [facility] Internal Medicine              Your Follow-Up and Future Appointments         Below is a list of your follow-up and future appointments. This may not be a complete list as you may have made appointments directly with providers that we are not aware of or your providers may have made some for you. Please call your providers to confirm appointments. It is important to keep your appointments. Please bring your current insurance card, photo ID, co-pay, and all medication bottles to your appointment. If self-pay, payment is expected at the time of service. Your To-Do List     Future Appointments Provider Department Dept Phone    2018 4:00 PM Gini Moise, [facility],[address] Internal Medicine [phone]    Please arrive 15 minutes prior to appointment, bring photo ID and insurance card. Follow-Up    Return if symptoms worsen or fail to improve. Information from Your Visit        Department     Name Address Phone [facility]    [facility] Internal Medicine [address] [place] [address]  [address]  [facility] [id number] [phone]      You Were Seen for:         Comments    Upper respiratory tract infection, unspecified type   [[id number]]         Vital Signs     Blood Pressure Pulse Temperature Height Weight Oxygen Saturation    124/62 78 98.6 °F (37 °C) 5' 9\" (1.753 m) 200 lb (90.7 kg) 98%    Body Mass Index Smoking Status                29.53 kg/m2 Never Smoker          Additional Information about your Body Mass Index (BMI)           Your BMI as listed above is considered overweight (25.0-29.9). BMI is an estimate of body fat, calculated from your height and weight.   The higher your BMI, the greater your risk of heart disease, high blood pressure, type 2 diabetes, stroke, gallstones, arthritis, sleep apnea, and certain Psychotherapy Group Note    PATIENT'S NAME: Maria Alejandra Betts  MRN:   [id number]  :   2000  ACCT. NUMBER: [id number]  DATE OF SERVICE: 23  START TIME: 10:00 AM  END TIME: 10:50 AM  FACILITATOR: Alicia Waters LPCC  TOPIC: MH EBP Group: Cognitive Restructuring  [facility] Adult Partial Hospitalization Program  TRACK: PHP2    NUMBER OF PARTICIPANTS: PHP2    Summary of Group / Topics Discussed:  Cognitive Restructuring: Core Beliefs: Patients received an overview of what a core belief is, and how they develop. Patients then began to identify their negative core beliefs. Patients worked to modify core beliefs with the goal of improved self-image and functioning.     Patient Session Goals / Objectives:  Familiarize self with the concept of core beliefs and how they develop.    Explore personal core beliefs (positive and negative)  Develop / advance recognition of the connection between negative thoughts and negative core beliefs.  Formulate new neutral/positive core beliefs             Patient Participation / Response:  Fully participated with the group by sharing personal reflections / insights and openly received / provided feedback with other participants.    Demonstrated understanding of topics discussed through group discussion and participation, Expressed understanding of the relationship between behaviors, thoughts, and feelings, Demonstrated knowledge of personal thought patterns and how they impact their mood and behavior., Identified barriers to changing thought patterns, Verbalized understanding of patient's own thought patterns and how they impact their mood and behaviors, and Practiced skills in session    Treatment Plan:  Patient has a current master individualized treatment plan.  See Epic treatment plan for more information.    CLEMENT Calderon    cancers. BMI is not perfect. It may overestimate body fat in athletes and people who are more muscular. If your body fat is high you can improve your BMI by decreasing your calorie intake and becoming more physically active.      Learn more at: [email or web address]             Where to Get Your Medications      These medications were sent to [address] [place] [address] [address], [address] [address]     Phone:  [phone]     levofloxacin 500 MG tablet    predniSONE 50 MG tablet         Your Current Medications Are              levofloxacin (LEVAQUIN) 500 MG tablet Take 1 tablet by mouth daily for 10 days    predniSONE (DELTASONE) 50 MG tablet Take 1 tablet by mouth daily for 5 days    atenolol (TENORMIN) 25 MG tablet Take 1 tablet by mouth daily    sodium chloride (OCEAN) 0.65 % nasal spray 1 spray by Nasal route as needed for Congestion    levonorgestrel (MIRENA) 20 MCG/24HR IUD 1 each by Intrauterine route once    DULoxetine HCl (CYMBALTA PO) Take 20 mg by mouth daily     ALPRAZolam (XANAX PO) Take 0.25 mg by mouth nightly as needed       Allergies              Codeine     Morphine Itching    Penicillins          Additional Information        Basic Information     Date Of Birth Sex Race Ethnicity Preferred Language    1981 Female White Non-/Non  English      Problem List as of 10/2/2017  Date Reviewed: 2017                Essential hypertension    HRP (high risk pregnancy)    Anemia    Anxiety    History of  delivery      Immunizations as of 10/2/2017     Name Date    Influenza Virus Vaccine 10/13/2016      Preventive Care        Date Due    Yearly Flu Vaccine (1) 2017    Tetanus Combination Vaccine (1 - Tdap) 2018 (Originally 3/28/2000)    Pap Smear 3/28/2019            MyChart Signup

## 2023-11-20 NOTE — GROUP NOTE
Process Group Note    PATIENT'S NAME: Maria Alejandra Betts  MRN:   [id number]  :   2000  ACCT. NUMBER: [id number]  DATE OF SERVICE: 23  START TIME:  9:00 AM  END TIME:  9:50 AM  FACILITATOR: Michael Victor LGSW  TOPIC:  Process Group    Diagnoses:  Major depressive disorder, recurrent moderate      [facility] Adult Partial Hospitalization Program  TRACK: [facility] 2    NUMBER OF PARTICIPANTS: 6        Data:    Session content: At the start of this group, patients were invited to check in by identifying themselves, describing their current emotional status, and identifying issues to address in this group.   Area(s) of treatment focus addressed in this session included Symptom Management, Personal Safety, and Community Resources/Discharge Planning.    How are you feeling today? Patient reported feeling  content      What goals are you working on? Patient discussed working towards emotion regulation and setting boundaries.     What skills will you use towards your goal? Pt identified the following skills: self-compassion, hobbies     What barriers might you encounter towards those goal(s)? Pt identified the following barrier(s): hopelessness and lack of motivation     Since last check-in: safety concerns, SI, SIB, chemical use? Pt reported no safety concerns, no SI, and no SIB. Pt reported nicotine use.      Are you taking medications? Pt reported they are taking their medications as prescribed.      What are you proud/grateful for? Pt reported feeling proud/grateful for PHP programming.     Process time? Pt discussed the following: Pt stated that they spent the weekend w/family. Pt mentioned wanting better transparency w/her parents and acknowledged the value differences b/w her and her dad. Pt stated that they felt dismissed by their father and how they view the world differently. Pt also mentioned wanting to be stable before returning to school to avoid withdrawing from classes again.     Therapeutic  Interventions/Treatment Strategies:  Psychotherapist offered support, feedback and validation and reinforced use of skills. Treatment modalities used include Motivational Interviewing, Cognitive Behavioral Therapy, and Dialectical Behavioral Therapy. Interventions include Cognitive Restructuring:  Assisted patient in formulating new neutral/positive alternatives to challenge less helpful / ineffective thoughts, Mindfulness: Facilitated discussion of when/how to use mindfulness skills to benefit general health, mental health symptoms, and stressors, and Relationship Skills: Encouraged development and maintenance  of healthy boundaries.    Assessment:    Patient response:   Patient responded to session by accepting feedback, giving feedback, listening, focusing on goals, being attentive, accepting support, appearing alert, and verbalizing understanding    Possible barriers to participation / learning include: severity of symptoms    Health Issues:   None reported       Substance Use Review:   Substance Use: No active concerns identified.    Mental Status/Behavioral Observations  Appearance:   Appropriate   Eye Contact:   Good   Psychomotor Behavior: Normal   Attitude:   Cooperative  Interested Friendly Pleasant  Orientation:   All  Speech   Rate / Production: Normal    Volume:  Normal   Mood:    Normal Euthymic  Affect:    Appropriate   Thought Content:   Clear  Thought Form:  Coherent  Logical     Insight:    Good     Plan:   Safety Plan: No current safety concerns identified.  Recommended that patient call 911 or go to the local ED should there be a change in any of these risk factors.   Barriers to treatment: None identified  Patient Contracts (see media tab):  None  Substance Use: Not addressed in session   Continue or Discharge: Patient will continue in Adult Partial Hospitalization Program (PHP)  as planned. Patient is likely to benefit from learning and using skills as they work toward the goals identified in  their treatment plan.      DELTA Gray  November 20, 2023

## 2023-11-21 ENCOUNTER — HOSPITAL ENCOUNTER (OUTPATIENT)
Dept: BEHAVIORAL HEALTH | Facility: CLINIC | Age: 23
Discharge: HOME | End: 2023-11-21
Attending: PSYCHIATRY & NEUROLOGY
Payer: COMMERCIAL

## 2023-11-21 DIAGNOSIS — F33.1 MAJOR DEPRESSIVE DISORDER, RECURRENT EPISODE, MODERATE (H): Primary | ICD-10-CM

## 2023-11-21 DIAGNOSIS — F41.1 GAD (GENERALIZED ANXIETY DISORDER): ICD-10-CM

## 2023-11-21 PROCEDURE — H0035 MH PARTIAL HOSP TX UNDER 24H: HCPCS

## 2023-11-21 PROCEDURE — H0035 MH PARTIAL HOSP TX UNDER 24H: HCPCS | Performed by: COUNSELOR

## 2023-11-21 PROCEDURE — H0035 MH PARTIAL HOSP TX UNDER 24H: HCPCS | Performed by: OCCUPATIONAL THERAPIST

## 2023-11-21 PROCEDURE — 99214 OFFICE O/P EST MOD 30 MIN: CPT | Performed by: PSYCHIATRY & NEUROLOGY

## 2023-11-21 NOTE — GROUP NOTE
Process Group Note    PATIENT'S NAME: Maria Alejandra Betts  MRN:   [id number]  :   2000  ACCT. NUMBER: [id number]  DATE OF SERVICE: 23  START TIME:  9:00 AM  END TIME:  9:50 AM  FACILITATOR: Michael Victor LGSW  TOPIC:  Process Group    Diagnoses:  Major depressive disorder, recurrent moderate     [facility] Adult Partial Hospitalization Program  TRACK: PHP 2    NUMBER OF PARTICIPANTS: 7        Data:    Session content: At the start of this group, patients were invited to check in by identifying themselves, describing their current emotional status, and identifying issues to address in this group.   Area(s) of treatment focus addressed in this session included Symptom Management, Personal Safety, and Community Resources/Discharge Planning.    How are you feeling today? Patient reported feeling  anxious ,  unsettled      What goals are you working on? Patient discussed working towards emotional regulation and setting boundaries.     What skills will you use towards your goal? Pt identified the following skills: emotional regulation, healthy distractions, affirmations.     What barriers might you encounter towards those goal(s)? Pt identified the following barrier(s): feeling  spacey , negative self-talk, anxiety.     Since last check-in: safety concerns, SI, SIB, chemical use? Pt reported no safety concerns, no SI, and no SIB. Pt reported nicotine use.      Are you taking medications? Pt reported they are taking their medications as prescribed.      What are you proud/grateful for? Pt reported feeling proud/grateful for PHP programming.     Process time? Pt discussed the following: Pt reported experiencing difficulties focusing today and wants to utilize grounding techniques to feel more present. Pt mentioned some helpful apps they use for calming and that they want to focus on their anxiety to assist w/MH Sx.     Therapeutic Interventions/Treatment Strategies:  Psychotherapist offered support,  "feedback and validation and reinforced use of skills. Treatment modalities used include Motivational Interviewing, Cognitive Behavioral Therapy, and Dialectical Behavioral Therapy. Interventions include Coping Skills: Assisted patient in identifying 1-2 healthy distraction skills to reduce overall distress and Discussed how the use of intentional \"in the moment\" actions can help reduce distress, Mindfulness: Facilitated discussion of when/how to use mindfulness skills to benefit general health, mental health symptoms, and stressors, and Relationship Skills: Encouraged development and maintenance  of healthy boundaries and Discussed strategies to promote healthier understanding of interpersonal relationships.    Assessment:    Patient response:   Patient responded to session by accepting feedback, giving feedback, listening, focusing on goals, being attentive, accepting support, appearing alert, and verbalizing understanding    Possible barriers to participation / learning include: severity of symptoms    Health Issues:   None reported       Substance Use Review:   Substance Use: No active concerns identified.    Mental Status/Behavioral Observations  Appearance:   Appropriate   Eye Contact:   Good   Psychomotor Behavior: Normal   Attitude:   Cooperative  Interested Friendly  Orientation:   All  Speech   Rate / Production: Normal    Volume:  Normal   Mood:    Anxious  Normal  Affect:    Appropriate  Flat  Lethargic   Thought Content:   Clear  Thought Form:  Coherent  Logical     Insight:    Good     Plan:   Safety Plan: No current safety concerns identified.  Recommended that patient call 911 or go to the local ED should there be a change in any of these risk factors.   Barriers to treatment: None identified  Patient Contracts (see media tab):  None  Substance Use: Not addressed in session   Continue or Discharge: Patient will continue in Adult Partial Hospitalization Program (PHP)  as planned. Patient is likely to " benefit from learning and using skills as they work toward the goals identified in their treatment plan.      Michael Victor, DELTA  November 21, 2023

## 2023-11-21 NOTE — PROGRESS NOTES
"General acute hospital   Adult Mental Health Outpatient Programs  Provider Interval History Note    Program: Partial Hospital Program (track 2)    PATIENT'S NAME: Maria Alejandra Betts  MRN:   [id number]  :   2000  ACCT. NUMBER: [id number]  DATE OF SERVICE: 23    Interval History:  \"I'm doing okay.\" Maria Alejandra presents today for follow-up and ongoing program supervision.   Endorses:  Feels they have made progress in the program  Worried about next steps, for example whether they can step down to intensive outpatient  Discussed treatment options  Patient would like to step down if possible  Picked up propranolol over the weekend, has not felt the need to try it since picking it up  And had elevated symptoms over the weekend before obtaining the medication, \"I wish I had had it then\"  Identifies greater anxiety or other distress as a trigger for increased and intrusive suicidal or self-injurious thinking    Substance use:  None endorsed    Safety Assessment:  Suicidal ideation: \"I did have some suicidal ideation at the end of last week and a little over the weekend  \"It's a little scary when it comes out of nowhere\"  Thoughts of non-suicidal self-injury: endorsed, similar to suicidal thinking above  Recent self-injurious behavior: denied  Homicidal ideation: denied  Other safety concerns: denied    Medications:  Current Outpatient Medications   Medication Sig Dispense Refill    citalopram (CELEXA) 20 MG tablet Take 1 tablet (20 mg) by mouth daily Start with half tablet daily for 1-2 weeks then whole tablet 90 tablet 1    ibuprofen (ADVIL/MOTRIN) 600 MG tablet Take 1 tablet (600 mg) by mouth every 6 hours as needed for moderate pain (Patient not taking: Reported on 2023) 30 tablet 0    propranolol (INDERAL) 10 MG tablet Take 1 tablet (10 mg) by mouth 2 times daily as needed (anxiety) 10 tablet 0    SUMAtriptan (IMITREX) 50 MG tablet TAKE 1-2 TABLETS BY MOUTH AT ONSET OF HEADACHE FOR " MIGRAINE MAY REPEAT IN 2 HOURS. MAX 4 TABS/24 HOURS. (Patient not taking: Reported on 9/8/2023) 18 tablet 11       The above list was reviewed with patient today.     Patient is taking medications as prescribed except where noted above and denies adverse effects    Laboratory Results:  Most recent labs reviewed. Pertinent updates/findings: None.     Metrics:  PHQ-9 scores:       12/24/2021    10:37 AM 2/7/2022    11:15 AM 1/26/2023     1:13 PM 11/13/2023    10:46 AM   PHQ-9 SCORE   PHQ-9 Total Score MyChart  22 (Severe depression) 16 (Moderately severe depression)    PHQ-9 Total Score 17 22 16 21       BRUCE-7 scores:       2/7/2022    11:16 AM 11/13/2023    10:46 AM   BRUCE-7 SCORE   Total Score 17 (severe anxiety)    Total Score 17 11       CSSR-S: [place] Suicide Severity Rating Scale (Short Version)      1/31/2022     6:27 PM 11/13/2023    10:00 AM   [place] Suicide Severity Rating (Short Version)   Over the past 2 weeks have you felt down, depressed, or hopeless? yes    Over the past 2 weeks have you had thoughts of killing yourself? yes    Have you ever attempted to kill yourself? no    Q1 Wished to be Dead (Past Month) yes yes   Q2 Suicidal Thoughts (Past Month) yes yes   Q3 Suicidal Thought Method no no   Q4 Suicidal Intent without Specific Plan no no   Q5 Suicide Intent with Specific Plan no no   Q6 Suicide Behavior (Lifetime) no yes   Comments  last year, med overdose - no medical attention sought   Within the Past 3 Months?  no   Level of Risk per Screen low risk moderate risk   Required Interventions Patient searched;Belongings removed;Room made safe    Interventions DEC consulted;Monitored via video          Mental Status Examination:  Vital Signs: There were no vitals taken for this visit.   Appearance: appropriately groomed, appears stated age, and in no apparent distress.  Attitude: cooperative   Eye Contact: good   Muscle Strength and Tone: no gross abnormalities   Psychomotor Behavior: normal or  "unremarkable   Gait and Station: deferred  Speech: normal rate, production, volume, and rhythm of, decreased prosody  Associations: No loosening of associations  Thought Process: coherent and goal directed  Thought Content: no evidence of psychotic thought, passive suicidal ideation present, thoughts of self-harm, which are decreased, no auditory hallucinations present, and no visual hallucinations present  Mood: \"anxious and depressed\"  Affect: mood congruent, intensity is blunted, constricted mobility, restricted range, and reactive  Insight: good  Judgment: intact, adequate for safety  Impulse Control: intact  Oriented to: time, place, person, and situation  Attention Span and Concentration: normal  Language: Intact  Recent and Remote Memory: intact to interview. Not formally assessed. No amnesia.  Fund of Knowledge/Assessment of Intelligence: Average  Capacity of Activities of Daily Living: Independent, able to participate in programmatic care services.    Diagnosis/es:    ICD-10-CM    1. Major depressive disorder, recurrent episode, moderate (H)  F33.1       2. BRUCE (generalized anxiety disorder)  F41.1         By history, traumatic brain injury, borderline personality disorder  Have not ruled out 300.01 (F41.0) Panic Disorder      Assessment/Plan:  Maria Alejandra presents today for follow up. Endorses progress with time spent in the program and anxiety has not required as needed medication in the time that such medication was available to the patient. No changes to medications today.    Continues to benefit from the program and does not feel sufficiently improved or stable to continue on after partial hospital to standard outpatient. I agree that intensive outpatient would be most appropriate. Internal referral has been made and we anticipate patient will be able to start intensive outpatient shortly after completing partial hospital. For now, continue PHP.    Depression  Overall improved   By no means remitted  No " changes to medications today  Continue PHP  Stepdown to day treatment/intensive outpatient    Anxiety, rule out panic disorder  Overall improved  Plan per above    Continue all other medications as reviewed per electronic medical record today    Continue therapy as planned:  Enrolled in partial hospital program  Continues to benefit from services  Continues to meet criteria for this level of care  Patient would be at reasonable risk of requiring a higher level of care in the absence of current services.  I feel this patient does not meet criteria for an involuntary hold and is appropriate for treatment at an outpatient level of care.  Continue with individual therapist as appropriate    Safety plan reviewed:  To the Emergency Department as needed or call after hours crisis line at [phone] or [phone]. [place] Crisis Text Line: Text [place] to 625117 or Suicide LifeLine Chat: suicidepreGifts that Giveline.org/chat    Follow-up:   schedule an appointment with me or another program provider in approximately in 1 week(s) or sooner if needed.  Can speak with a staff member or call the appropriate program number (see below) to schedule  Follow up with outpatient provider(s) as planned or sooner if needed for acute medical concerns.    Questions or concerns:  Call program line with questions or concerns (see below)  [employer] may be used to communicate with your provider, but this is not intended to be used for emergencies.    [facility] Adult Mental Health Program lines:  Partial Hospital: [phone]  Dual Disorder: [phone]  Adult Day Treatment:  [phone]  55+/Intensive Outpatient: [phone]    Community Resources:    National Suicide Prevention Lifeline: 988 from any phone, or 129-968-4793 (TTY: 333.572.2908). Call anytime for help.  (www.suicidepreventionlifeline.org)  National [place] on Mental Illness (www.[place].org): 341.595.4841 or 930-195-9232.   Mental Health Association  (www.mentalhealth.org): 915.944.3924 or 374-566-3804.  [place] Crisis Text Line: Text [place] to 854508  Suicide LifeLine Chat: suicidepreventionlifeline.org/chat    Treatment Objective(s) Addressed in This Session:  The purpose of today's virtual visit is for this writer to provide oversight of patient's care while receiving program services. Specific treatment goals addressed included personal safety, symptoms stabilization and management, wellness and mental health, and community resources/discharge planning.     This author or another program provider will follow up with the patient as noted above.     Patient agrees with the current plan of care.    Igor Persaud MD  11/21/23      Visit Details:  Type of service: In-person    Location (patient and provider): [facility] Adult Mental Health Outpatient Programmatic Care Offices    Level of Medical Decision Making:   - At least 1 chronic problem that is not stable  - Engaged in prescription drug management during visit (discussed any medication benefits, side effects, alternatives, etc.)  Discussion of management or test interpretation with external physician/other qualified healthcare professional/appropriate source - [facility] program multidisciplinary treatment team    This document completed in part using Dragon Medical One dictation software.  Please excuse any inadvertent word or phrase substitutions.

## 2023-11-21 NOTE — GROUP NOTE
"Psychotherapy Group Note    PATIENT'S NAME: Maria Alejandra Betts  MRN:   [id number]  :   2000  ACCT. NUMBER: [id number]  DATE OF SERVICE: 23  START TIME: 10:00 AM  END TIME: 10:50 AM  FACILITATOR: Josh So LMFT  TOPIC:  EBP Group: Behavioral Activation  [facility] Adult Partial Hospitalization Program  TRACK: PHP2    NUMBER OF PARTICIPANTS: 6    Summary of Group / Topics Discussed:  Behavioral Activation: The Change Process - Behavior Change: Patients explored the process and types of change, including but not limited to, theories of change, steps to making change, methods of changing behavior, and potential barriers.  Patients worked to identify what changes may benefit their daily lives, and work towards a plan to implement change.      Patient Session Goals / Objectives:  Demonstrate understanding of the change process.    Identify positive and negative behavioral patterns.  Identify personal barriers to change      Patient Participation / Response:  Fully participated with the group by sharing personal reflections / insights and openly received / provided feedback with other participants.  Maria Alejandra took some process time and talked with the group about wanting to have better communication with friends and employers, rather than \"ghosting\" them.  Demonstrated understanding of topics discussed through group discussion and participation, Expressed understanding of the relationship between behaviors, thoughts, and feelings, Identified / Expressed personal readiness to make behavioral change, and Practiced skills in session    Treatment Plan:  Patient has a current master individualized treatment plan and today was our weekly review of the patient's progress.  See Epic treatment plan for progress / updates on goals and plan.    BRIGIDA Velazquez           "

## 2023-11-21 NOTE — GROUP NOTE
Psychotherapy Group Note    PATIENT'S NAME: Maria Alejandra Betts  MRN:   [id number]  :   2000  ACCT. NUMBER: [id number]  DATE OF SERVICE: 23  START TIME:  1:00 PM  END TIME:  1:50 PM  FACILITATOR: Michael Victor LGSW  TOPIC:  EBP Group: Social Support  [facility] Adult Partial Hospitalization Program  TRACK: PHP 2    NUMBER OF PARTICIPANTS: 7    Summary of Group / Topics Discussed:  Social Support:   Meeting: Patients and their support system participated in a session on creating family and community-based support.  The importance and role of family/social support was stressed to increase the effectiveness of treatment. Patients and their support system were given information on PHP structure, diagnoses served within the program, in addition to key goals and objectives. Additionally, physical symptoms, thoughts, and behaviors commonly observed in psychopathology were presented and discussed.  Lastly, information was presented on ways to help promote and support recovery.  The patients and their support system benefitted from this session by working through an exercise designed to clarify their needs for assistance and support in order to enhance their recovery. Staff helped each clarify their roles, so that the patients can be in charge of their stabilization and recovery.      Patient Session Goals / Objectives:  Normalize aspects of mental health conditions and mental illness  Provide education on  normal  vs.  abnormal  aspects of mental health  Clarify the role of partial hospitalization programming  Reduce stigma associated with mental health diagnosis (es).  Provide social support system with techniques and strategies to improve communication and enhance empathy.  Improve interpersonal communication regarding symptoms  Build a sense of mastery and self-respect      Patient Participation / Response:  Fully participated with the group by sharing personal reflections / insights and  openly received / provided feedback with other participants.    Pt had no support people present for group and activated participated.    Treatment Plan:  Patient has a current master individualized treatment plan and today was our weekly review of the patient's progress.  See Epic treatment plan for progress / updates on goals and plan.    DELTA Gray

## 2023-11-21 NOTE — GROUP NOTE
OT Clinic Group Note    PATIENT'S NAME: Maria Alejandra Betts  MRN:   [id number]  :   2000  ACCT. NUMBER: [id number]  DATE OF SERVICE: 23  START TIME:  2:00 PM  END TIME:  2:50 PM  FACILITATOR: Bushra Wilcox OTR  TOPIC: MH PHP OT Group: Occupational Therapy Clinic  [facility] Adult Partial Hospitalization Program  TRACK: PHP2    NUMBER OF PARTICIPANTS: 5    Summary of Group / Topics Discussed:  Occupational Therapy Clinic & Behavioral Activation:  Introduced the clinic group as a time to practice the skills of behavioral activation, follow-through, and self-regulation.  Discussions included reviewing the benefits and barriers to making behavioral changes.  Provided opportunity for patients to independently choose and engage in a therapeutic or productive activity that supports progress towards their goals and psychiatric recovery. Provided a context for patients to monitor their symptoms, gain self-awareness, practice skills, and build a sense of self efficacy and mastery.  OT observed, assessed, and monitored performance skills and patterns in context with each group member. Patients were then prompted to identify how the skills practiced in group carry over into everyday life.     Patient Session Goals / Objectives:  Independently identify a purposeful and meaningful therapeutic activity.  Identified which goal(s) they are intentionally working on during session.   Practiced behavioral activation, follow-through, and self-regulation  Reflected on their performance and share insight about their progress.  Practiced and identified a way to generalize a skill to their everyday life      Patient Participation / Response:  Fully participated with the group by sharing personal reflections / insights.  Affect appeared anxious, and patient shared she is feeling anxious and shaky today.  She stated she is also having difficulty with focus and found it challenging to focus on creative expression task during  group.  Despite this, she appeared to put forth good effort and demonstrated good insight.     Treatment Plan:  Patient has a current master individualized treatment plan and today was our weekly review of the patient's progress.  See Epic treatment plan for progress / updates on goals and plan.    Bushra Wilcox, OTR

## 2023-11-22 ENCOUNTER — HOSPITAL ENCOUNTER (OUTPATIENT)
Dept: BEHAVIORAL HEALTH | Facility: CLINIC | Age: 23
Discharge: HOME | End: 2023-11-22
Attending: PSYCHIATRY & NEUROLOGY
Payer: COMMERCIAL

## 2023-11-22 PROCEDURE — H0035 MH PARTIAL HOSP TX UNDER 24H: HCPCS

## 2023-11-22 NOTE — GROUP NOTE
Psychoeducation Group Note    PATIENT'S NAME: Maria Alejandra Betts  MRN:   [id number]  :   2000  ACCT. NUMBER: [id number]  DATE OF SERVICE: 23  START TIME: 11:00 AM  END TIME: 11:50 AM  FACILITATOR: Shelby Richter OTR  TOPIC: MH PHP OT Group: Self- Regulation Skills  [facility] Adult Partial Hospitalization Program  TRACK: PHP 2    NUMBER OF PARTICIPANTS: 7    Summary of Group / Topics Discussed:  Self-Regulation Skills: Coping Cards: Provided education on the benefits of developing a robust coping plan to help manage distress and regulate emotions.  Discussed the importance of having easy access to this plan in times of increased symptoms.  Brainstormed with group members to identify coping skills in the categories of sensory strategies, distraction techniques, cognitive strategies, and physical activity.  Instructed patients to select 1-2 skills from each category to include in their deck of coping cards, then provided time and materials for patients to create this deck.  Prompted patients to consider where they might keep this tool and when they might find it most helpful.      Patient Session Goals / Objectives:  Review the benefits of having a robust coping plan to help manage distress and regulate emotions  Develop a deck of personal coping cards to promote self-regulation and independent skill use  Established a plan for practice of these skills in their own environments       Patient Participation / Response:  Fully participated with the group by sharing personal reflections / insights and openly received / provided feedback with other participants.  Demonstrated understanding of content through completion of activity and self reflecting where they can place their coping cards for easy access: on their bag.  Patient's affect was appropriate to situation and mood congruent.     Treatment Plan:  Patient has a current master individualized treatment plan.  See Epic treatment plan for more  information.    Shelby Richter, OTR

## 2023-11-22 NOTE — GROUP NOTE
Process Group Note    PATIENT'S NAME: Maria Alejandra Betts  MRN:   [id number]  :   2000  ACCT. NUMBER: [id number]  DATE OF SERVICE: 23  START TIME:  9:00 AM  END TIME: 10:50 AM  FACILITATOR: Michael Victor LGSW  TOPIC:  Process Group    Diagnoses:  Major depressive disorder, recurrent moderate      [facility] Adult Partial Hospitalization Program  TRACK: [facility] 2    NUMBER OF PARTICIPANTS: 7    Radical acceptance material was handed to participants at the end of 1000 hour.      Data:    Session content: At the start of this group, patients were invited to check in by identifying themselves, describing their current emotional status, and identifying issues to address in this group.   Area(s) of treatment focus addressed in this session included Symptom Management, Personal Safety, and Community Resources/Discharge Planning.    How are you feeling today? Patient reported feeling  withdrawn ,  inadequate      What goals are you working on? Patient discussed working towards emotion regulation, boundary setting     What skills will you use towards your goal? Pt identified the following skills: guided meditation, mindfulness     What barriers might you encounter towards those goal(s)? Pt identified the following barrier(s): negative self-talk, family dynamics     Since last check-in: safety concerns, SI, SIB, chemical use? Pt reported no safety concerns, no SI, and no SIB. Pt reported nicotine use.      Are you taking medications? Pt reported they are taking their medications as prescribed.      What are you proud/grateful for? Pt reported feeling proud/grateful for being present at programming.     Process time? Pt discussed the following: Pt stated she had a conversation w/her mother this morning that was unexpected and brought up a lot of trauma for her. Pt engaged in black and white thinking re: future oriented thoughts and reported feeling isolated. Pt stated they re excited to start IOP when that time  comes.     Therapeutic Interventions/Treatment Strategies:  Psychotherapist offered support, feedback and validation, set limits, and reinforced use of skills. Treatment modalities used include Motivational Interviewing, Cognitive Behavioral Therapy, and Dialectical Behavioral Therapy. Interventions include Behavioral Activation: Reinforced benefits/challenges of change process through applying skills to replace unwanted behaviors and Explored how behaviors effect mood and interact with thoughts and feelings, Cognitive Restructuring:  Assisted patient in formulating new neutral/positive alternatives to challenge less helpful / ineffective thoughts and Facilitated recognition of the connection between negative thoughts and negative core beliefs, Mindfulness: Facilitated discussion of when/how to use mindfulness skills to benefit general health, mental health symptoms, and stressors, Symptoms Management: Promoted understanding of their diagnoses and how it impacts their functioning, and Relationship Skills: Assisted patients in implementing more effective communication skills in their relationships and Discussed strategies to promote healthier understanding of interpersonal relationships.    Assessment:    Patient response:   Patient responded to session by accepting feedback, giving feedback, listening, focusing on goals, being attentive, accepting support, appearing alert, and verbalizing understanding    Possible barriers to participation / learning include: severity of symptoms    Health Issues:   None reported       Substance Use Review:   Substance Use: No active concerns identified.    Mental Status/Behavioral Observations  Appearance:   Appropriate   Eye Contact:   Good   Psychomotor Behavior: Normal   Attitude:   Cooperative  Friendly  Orientation:   All  Speech   Rate / Production: Monotone  Normal    Volume:  Soft   Mood:    Anxious  Depressed  Normal Sad   Affect:    Appropriate  Tearful  Thought Content:    Clear  Thought Form:  Coherent  Logical     Insight:    Fair     Plan:   Safety Plan: No current safety concerns identified.  Recommended that patient call 911 or go to the local ED should there be a change in any of these risk factors.   Barriers to treatment: None identified  Patient Contracts (see media tab):  None  Substance Use: Not addressed in session   Continue or Discharge: Patient will continue in Adult Partial Hospitalization Program (PHP)  as planned. Patient is likely to benefit from learning and using skills as they work toward the goals identified in their treatment plan.      Michael Victor, DELTA  November 22, 2023

## 2023-11-22 NOTE — GROUP NOTE
Psychoeducation Group Note    PATIENT'S NAME: Maria Alejandra Betts  MRN:   [id number]  :   2000  ACCT. NUMBER: [id number]  DATE OF SERVICE: 23  START TIME:  2:00 PM  END TIME:  2:50 PM  FACILITATOR: Amena Negrete RN  TOPIC:  Wellness Group: Mind/Body Practice & Complementary  [facility] Adult Partial Hospitalization Program  TRACK: [facility] 2    NUMBER OF PARTICIPANTS: 6    Summary of Group / Topics Discussed:  Mind/Body Practice & Complementary Therapies:  Relaxation Techniques: In this group, patients were educated on a variety of relaxation and mindfulness skills to reduce distress and improve coping skills. The skills were taught to the group and then practiced through an organized exercise.     Skills taught & practiced included:  Personal Relaxation Scene, Deep Breathing Exercise, Sensory Exercise, Mindful Eating,  Mindful Walking Exercise, Guided Imagery, and stress journaling.      Patient Session Goals / Objectives:  Identified relaxation skills  Explained how the various relaxation skills are practiced  Practiced relaxation experiential       Patient Participation / Response:  Moderately participated, sharing some personal reflections / insights and adequately adequately received / provided feedback with other participants.    Identified / Expressed personal readiness to practice skills    Treatment Plan:  Patient has a current master individualized treatment plan.  See Epic treatment plan for more information.    Amena Negrete RN

## 2023-11-22 NOTE — GROUP NOTE
Psychotherapy Group Note    PATIENT'S NAME: Maria Alejandra Betts  MRN:   [id number]  :   2000  ACCT. NUMBER: [id number]  DATE OF SERVICE: 23  START TIME:  1:00 PM  END TIME:  1:50 PM  FACILITATOR: Leann Stnoe LICSW  TOPIC:  EBP Group: DDP Relapse Prevention  [facility] Adult Partial Hospitalization Program  TRACK: PHP 2    NUMBER OF PARTICIPANTS: 7    Summary of Group / Topics Discussed:  DDP Relapse Prevention: Outpatient therapy, IOPs and Community Resources: Patients explored the different supports and community resources that aid in recovery.  Patients examined their attitudes and preconceived notions about therapy and group programming including identifying challenges and barriers to participation and attendance in community supports. The importance of attending additional supports in the recovery journey was discussed.    Patient Session Goals / Objectives:  Identified reasons why ongoing support is necessary   Identified the challenges and barriers to participation and access of services  [place] the differences between different types of therapy modalities and licensure   Explored what a healthy and effective therapeutic relationship looks like and feels like      Patient Participation / Response:  Fully participated with the group by sharing personal reflections / insights and openly received / provided feedback with other participants.    Demonstrated understanding of topics discussed through group discussion and participation    Treatment Plan:  Patient has a current master individualized treatment plan.  See Epic treatment plan for more information.    ELLIE Wong

## 2023-11-24 ENCOUNTER — HOSPITAL ENCOUNTER (OUTPATIENT)
Dept: BEHAVIORAL HEALTH | Facility: CLINIC | Age: 23
Discharge: HOME | End: 2023-11-24
Attending: PSYCHIATRY & NEUROLOGY
Payer: COMMERCIAL

## 2023-11-24 PROCEDURE — H0035 MH PARTIAL HOSP TX UNDER 24H: HCPCS

## 2023-11-24 NOTE — GROUP NOTE
Process Group Note    PATIENT'S NAME: Maria Alejandra Betts  MRN:   [id number]  :   2000  ACCT. NUMBER: [id number]  DATE OF SERVICE: 23  START TIME:  9:00 AM  END TIME:  9:50 AM  FACILITATOR: Michael Victor LGSW  TOPIC:  Process Group    Diagnoses:  Major depressive disorder, recurrent moderate     M [facility] Adult Partial Hospitalization Program  TRACK: PHP 2    NUMBER OF PARTICIPANTS: 8        Data:    Session content: At the start of this group, patients were invited to check in by identifying themselves, describing their current emotional status, and identifying issues to address in this group.   Area(s) of treatment focus addressed in this session included Symptom Management, Personal Safety, and Community Resources/Discharge Planning.    How are you feeling today? Patient reported feeling tired and optimistic     What goals are you working on? Patient discussed working towards emotional regulation and boundary setting     What skills will you use towards your goal? Pt identified the following skills: guided meditation, not isolating.     What barriers might you encounter towards those goal(s)? Pt identified the following barrier(s): lack of motivation     Since last check-in: safety concerns, SI, SIB, chemical use? Pt reported no safety concerns, no SI, and no SIB. Pt reported substance use.      Are you taking medications? Pt reported they are taking their medications as prescribed.      What are you proud/grateful for? Pt reported feeling proud/grateful for PHP programming.     Process time? Pt discussed the following: Pt mentioned that they want to be mindful of not perseverating on negative outcomes. Pt reflected on how to monitor progress for herself and that they find group reinforcement helpful.     Therapeutic Interventions/Treatment Strategies:  Psychotherapist offered support, feedback and validation and reinforced use of skills. Treatment modalities used include Motivational  Interviewing, Cognitive Behavioral Therapy, and Dialectical Behavioral Therapy. Interventions include Behavioral Activation: Reinforced benefits/challenges of change process through applying skills to replace unwanted behaviors, Cognitive Restructuring:  Explored impact of ineffective thoughts / distortions on mood and activity, Coping Skills: Addressed barriers to utilizing coping skills when in distress, and Symptoms Management: Promoted understanding of their diagnoses and how it impacts their functioning.    Assessment:    Patient response:   Patient responded to session by accepting feedback, giving feedback, listening, focusing on goals, being attentive, accepting support, appearing alert, and verbalizing understanding    Possible barriers to participation / learning include: severity of symptoms    Health Issues:   None reported       Substance Use Review:   Substance Use: No active concerns identified.    Mental Status/Behavioral Observations  Appearance:   Appropriate   Eye Contact:   Good   Psychomotor Behavior: Normal   Attitude:   Cooperative  Interested Friendly  Orientation:   All  Speech   Rate / Production: Normal    Volume:  Soft   Mood:    Normal  Affect:    Appropriate  Flat  Subdued   Thought Content:   Clear  Thought Form:  Coherent  Logical     Insight:    Good     Plan:   Safety Plan: No current safety concerns identified.  Recommended that patient call 911 or go to the local ED should there be a change in any of these risk factors.   Barriers to treatment: None identified  Patient Contracts (see media tab):  None  Substance Use: Not addressed in session   Continue or Discharge: Patient will continue in Adult Partial Hospitalization Program (PHP)  as planned. Patient is likely to benefit from learning and using skills as they work toward the goals identified in their treatment plan.      DELTA Gray  November 24, 2023

## 2023-11-24 NOTE — GROUP NOTE
Psychoeducation Group Note    PATIENT'S NAME: Maria Alejandra Betts  MRN:   [id number]  :   2000  ACCT. NUMBER: [id number]  DATE OF SERVICE: 23  START TIME:  1:00 PM  END TIME:  1:50 PM  FACILITATOR: Amena Negrete RN  TOPIC:  Wellness Group: Medication Education and Management  [facility] Partial Hospitalization Program  TRACK: PHP-2    NUMBER OF PARTICIPANTS: 6    Summary of Group / Topics Discussed:  Medication Educations and Management:  Medication Jeopardy: Patients provided education regarding medication safety, antidepressants, side effects, neuroleptics, expected medication outcomes, knowledge of diagnosis, symptoms, and symptom management through an engaging jeopardy-style format.     Patient Session Goals / Objectives:    Participated in team-based Jeopardy game  Identified strategies for safe use, handling, and disposal of medications  Discussed basic aspects of medication safety, side effects, adverse outcomes and contraindications      Patient Participation / Response:  Moderately participated, sharing some personal reflections / insights and adequately adequately received / provided feedback with other participants.     Identified / Expressed personal readiness to practice skills    Treatment Plan:  Patient has a current master individualized treatment plan.  See Epic treatment plan for more information.    Amena Negrete, RN

## 2023-11-24 NOTE — GROUP NOTE
Psychoeducation Group Note    PATIENT'S NAME: Maria Alejandra Betts  MRN:   [id number]  :   2000  ACCT. NUMBER: [id number]  DATE OF SERVICE: 23  START TIME: 11:00 AM  END TIME: 11:50 AM  FACILITATOR: Massiel Nathan OT  TOPIC: [facility] OT Group: Lifestyle Balance and Structure  [facility] Adult Partial Hospitalization Program  TRACK: [facility] 2    NUMBER OF PARTICIPANTS: 6      Lifestyle Balance and Structure:  Weekend Planning: To support structure and routine during the weekend, the group members were led through a facilitated discussion on the importance of structure and routine for overall mental wellness and to promote leisure, productivity, and self care during unstructured time. Patients discussed the importance of life balance and identified at least 1 self care activity, leisure activity, and productivity activity that they want to do this weekend to improve structure/routine. Patients independently created a weekend plan and shared with the group their personal plans. Patients identified potential barriers to their plan and skills that will help overcome the barriers. Validation, support, and feedback was provided throughout group process.         Patient Session Goals / Objectives:   Reflected on and created a weekend plan  Discussed the importance of life balance, structure, and routine  Identified and planned 3 activities in the areas of leisure, productivity, and self care for the weekend  Identified and problem solved barriers to achieving identified plans      Patient Participation / Response:  Fully participated with the group by sharing personal reflections / insights and openly received / provided feedback with other participants. Patient independently identified a self care activity (rest time for their brain), productive activity (meal prep), and leisure activity (reading at a coffee shop) that they want to complete this weekend. Patient self reflected on potential barriers to their  weekend and problem solved skills they could use.     Treatment Plan:  Patient has a current master individualized treatment plan.  See Epic treatment plan for more information.    Massiel Nathan, OT

## 2023-11-24 NOTE — GROUP NOTE
Psychotherapy Group Note    PATIENT'S NAME: Maria Alejandra Betts  MRN:   [id number]  :   2000  ACCT. NUMBER: [id number]  DATE OF SERVICE: 23  START TIME: 10:00 AM  END TIME: 10:50 AM  FACILITATOR: Michael Victor LGSW  TOPIC:  EBP Group: Emotions Management  [facility] Adult Partial Hospitalization Program  TRACK: PHP 2    NUMBER OF PARTICIPANTS: 5    Summary of Group / Topics Discussed:  Emotions Management: Guilt and Shame: Patients explored and shared personal experiences associated with feelings of guilt and shame.  Group explored how these feelings develop, what they mean to each individual, and how to increase acceptance and usefulness of these feelings.  Group members assisted one another to contextualize these concepts and promote healing.     Patient Session Goals / Objectives:  Discuss and review definitions and personal views/experiences with guilt and shame  Understand the differences between guilt and shame  Explore how feelings of guilt and shame impact functioning  Understand and practice strategies to manage difficult emotions and move towards healing  Understand and normalize difficult emotions through group discussion  Understand the utility of guilt and shame  Target  unwanted  emotions for change      Patient Participation / Response:  Fully participated with the group by sharing personal reflections / insights and openly received / provided feedback with other participants.    Demonstrated understanding of topics discussed through group discussion and participation, Expressed understanding of the relevance / importance of emotions management skills at distressing times in life, Self-aware of experiences with difficult emotions, and strategies to employ to manage them, and Demonstrated knowledge of when to consider applying a variety of emotions management skills in daily life    Pt was engaged in provided meaningful comments/discussion to group.    Treatment Plan:  Patient has a  current master individualized treatment plan.  See Epic treatment plan for more information.    DELTA Gray

## 2023-11-24 NOTE — GROUP NOTE
Psychotherapy Group Note    PATIENT'S NAME: Maria Alejandra Betts  MRN:   [id number]  :   2000  ACCT. NUMBER: [id number]  DATE OF SERVICE: 23  START TIME:  2:00 PM  END TIME:  2:50 PM  FACILITATOR: Michael Victor LGSW  TOPIC:  EBP Group: Specialty [facility] Adult Partial Hospitalization Program  TRACK: [facility] 2    NUMBER OF PARTICIPANTS: 6    Summary of Group / Topics Discussed:  Specialty Topics: Hope: The topic of hope was presented in order to help patients better understand the symptoms of hopelessness and how to become more hopeful. Patients discussed their current awareness of the topic and relevance to their functioning. Individual experiences with symptoms and treatment options were also discussed. Patients explored options for ongoing/future treatment and symptom management.      Patient Session Goals / Objectives:  Discussed definition of hopelessness  Discussed how hopelessness impacts functioning  Set a plan to utilize skills to reduce hopelessness      Patient Participation / Response:  Fully participated with the group by sharing personal reflections / insights and openly received / provided feedback with other participants.    Demonstrated understanding of topics discussed through group discussion and participation, Identified / Expressed readiness to act on skill suggestions discussed in topic, Verbalized understanding of ways to proactively manage illness, and Identified plan to address barriers to practicing skills discussed in topic    Pt provided meaningful commentary and reflections.    Treatment Plan:  Patient has a current master individualized treatment plan.  See Epic treatment plan for more information.    DELTA Gray

## 2023-11-27 ENCOUNTER — HOSPITAL ENCOUNTER (OUTPATIENT)
Dept: BEHAVIORAL HEALTH | Facility: CLINIC | Age: 23
Discharge: HOME | End: 2023-11-27
Attending: PSYCHIATRY & NEUROLOGY
Payer: COMMERCIAL

## 2023-11-27 DIAGNOSIS — F41.1 GAD (GENERALIZED ANXIETY DISORDER): ICD-10-CM

## 2023-11-27 DIAGNOSIS — F33.1 MAJOR DEPRESSIVE DISORDER, RECURRENT EPISODE, MODERATE (H): Primary | ICD-10-CM

## 2023-11-27 PROCEDURE — H0035 MH PARTIAL HOSP TX UNDER 24H: HCPCS

## 2023-11-27 PROCEDURE — H0035 MH PARTIAL HOSP TX UNDER 24H: HCPCS | Performed by: COUNSELOR

## 2023-11-27 PROCEDURE — 99214 OFFICE O/P EST MOD 30 MIN: CPT | Performed by: PSYCHIATRY & NEUROLOGY

## 2023-11-27 RX ORDER — HYDROXYZINE HYDROCHLORIDE 25 MG/1
25-50 TABLET, FILM COATED ORAL 3 TIMES DAILY PRN
Qty: 30 TABLET | Refills: 0 | Status: SHIPPED | OUTPATIENT
Start: 2023-11-27 | End: 2023-12-19

## 2023-11-27 ASSESSMENT — COLUMBIA-SUICIDE SEVERITY RATING SCALE - C-SSRS
6. HAVE YOU EVER DONE ANYTHING, STARTED TO DO ANYTHING, OR PREPARED TO DO ANYTHING TO END YOUR LIFE?: NO
TOTAL  NUMBER OF INTERRUPTED ATTEMPTS SINCE LAST CONTACT: NO
ATTEMPT SINCE LAST CONTACT: NO
TOTAL  NUMBER OF ABORTED OR SELF INTERRUPTED ATTEMPTS SINCE LAST CONTACT: NO
SUICIDE, SINCE LAST CONTACT: NO
1. SINCE LAST CONTACT, HAVE YOU WISHED YOU WERE DEAD OR WISHED YOU COULD GO TO SLEEP AND NOT WAKE UP?: NO
2. HAVE YOU ACTUALLY HAD ANY THOUGHTS OF KILLING YOURSELF?: NO

## 2023-11-27 NOTE — GROUP NOTE
Psychoeducation Group Note    PATIENT'S NAME: Maria Alejandra Betts  MRN:   [id number]  :   2000  ACCT. NUMBER: [id number]  DATE OF SERVICE: 23  START TIME: 11:00 AM  END TIME: 11:50 AM  FACILITATOR: Massiel Nathan OT  TOPIC: [facility] OT Group: Self- Regulation Skills  [facility] Partial Hospitalization Program  TRACK: [facility] 2    NUMBER OF PARTICIPANTS: 3    Summary of Group / Topics Discussed:   Self-Regulation Skills: Relaxation: Patients were provided with verbal and experiential education to identify physical and sensorimotor based activities that can be utilized for relaxation, stress management, self care, health maintenance, and self regulation. Patients went through an experiential practice of different relaxation skills and identified skills they can use in their daily life to decrease anxiety and promote relaxation. Patients increased knowledge and awareness of activities that support relaxation, build resiliency, and prevent relapse through healthy engagement in mindful focused activities.       Patient Session Goals / Objectives:    Identified specific physical and sensorimotor based activities for relaxation    Improved awareness of activities that are meaningful focused activities that support relaxation    Established a plan for practice of these skills in their own environments    Practiced and reflected on how to generalize taught skills to their everyday life     Patient Participation / Response:  Fully participated with the group by sharing personal reflections / insights. Pt expressed feeling calmer and more grounded at the end of group. Affect was appropriate to situation and mood congruent. Demonstrated understanding of content through structured skill practice and verbal participation in group discussion.     Treatment Plan:  See Epic Treatment Plan - Patient is discharging.    Massiel Nathan OT

## 2023-11-27 NOTE — GROUP NOTE
Process Group Note    PATIENT'S NAME: Maria Alejandra Betts  MRN:   [id number]  :   2000  ACCT. NUMBER: [id number]  DATE OF SERVICE: 23  START TIME:  9:00 AM  END TIME:  9:50 AM  FACILITATOR: Michael Victor LGSW  TOPIC:  Process Group    Diagnoses:  Major depressive disorder, recurrent moderate      [facility] Adult Partial Hospitalization Program  TRACK: PHP 2    NUMBER OF PARTICIPANTS: 6        Data:    Session content: At the start of this group, patients were invited to check in by identifying themselves, describing their current emotional status, and identifying issues to address in this group.   Area(s) of treatment focus addressed in this session included Symptom Management, Personal Safety, and Community Resources/Discharge Planning.    How are you feeling today? Patient reported feeling  anxious, loved, hopeful.      What goals are you working on? Patient discussed working towards emotional regulation, boundary setting, and self-advocacy     What skills will you use towards your goal? Pt identified the following skills: coping cards, healthy distractions, relying on supports.     What barriers might you encounter towards those goal(s)? Pt identified the following barrier(s):  overthinking, negative self-talk      Since last check-in: safety concerns, SI, SIB, chemical use? Pt reported no safety concerns, no SI, and no SIB. Pt reported nicotine and marijuana use.      Are you taking medications? Pt reported they are taking their medications as prescribed.      What are you proud/grateful for? Pt reported feeling proud/grateful for completing PHP.     Process time? Pt discussed the following: Pt mentioned reaching out to supports via active communication. Pt commented on weekend activities and how she s proud to have completed some arts/crafts. Pt mentioned hanging out w/her ex and that they still have complicated feelings towards each other. Pt acknowledged that they experience attachment  concerns and stated they do harbor the hope that they ll end up w/their ex down the road. Pt acknowledged she has work to do on herself first before meaningful change can happen in that area.     Therapeutic Interventions/Treatment Strategies:  Psychotherapist offered support, feedback and validation and reinforced use of skills. Treatment modalities used include Motivational Interviewing, Cognitive Behavioral Therapy, and Dialectical Behavioral Therapy. Interventions include Mindfulness: Facilitated discussion of when/how to use mindfulness skills to benefit general health, mental health symptoms, and stressors, Emotions Management:  Discussed barriers to emotional regulation, and Relationship Skills: Encouraged development and maintenance  of healthy boundaries.    Assessment:    Patient response:   Patient responded to session by accepting feedback, giving feedback, listening, focusing on goals, being attentive, accepting support, appearing alert, and verbalizing understanding    Possible barriers to participation / learning include: severity of symptoms    Health Issues:   None reported       Substance Use Review:   Substance Use: No active concerns identified.    Mental Status/Behavioral Observations  Appearance:   Appropriate   Eye Contact:   Good   Psychomotor Behavior: Normal   Attitude:   Cooperative  Interested Friendly  Orientation:   All  Speech   Rate / Production: Normal    Volume:  Soft   Mood:    Anxious  Normal  Affect:    Appropriate  Flat   Thought Content:   Clear  Thought Form:  Coherent  Logical     Insight:    Good     Plan:   Safety Plan: No current safety concerns identified.  Recommended that patient call 911 or go to the local ED should there be a change in any of these risk factors.   Barriers to treatment: None identified  Patient Contracts (see media tab):  None  Substance Use: Not addressed in session   Continue or Discharge: Patient is being discharged today. See Treatment Plan and  Discharge Summary.       DELTA Gray  November 27, 2023

## 2023-11-27 NOTE — PROGRESS NOTES
"TRANSFER SBAR NOTE  Adult  Outpatient Programs          SITUATION:     Admission Date: 11/27/2023    Patient name:  Maria Alejandra Betts  Preferred name: Maria Alejandra she/they 23 year old  Diagnosis/-es (copy from , including ICD-10):   1. Major depressive disorder, recurrent episode, moderate (H)  F33.1         2. Generalized anxiety disorder  F41.1           By history, traumatic brain injury, borderline personality disorder  Have not ruled out 300.01 (F41.0) Panic Disorder    New assigned Program/Track: IOP/DT 5  Is this a new Level of Care? yes  NOTE: LOC: PHP, IOP/Day Tx, \"Clinic\". If yes, LOCUS and Discharge Summary required.     Reviewed patient's schedule and informed them of any variation due to late days (PHP) or Holidays. yes  Does the patient have any planned absences and/or barriers to admission/treatment? no  NOTE: impact of transportation, technology, childcare, work, or housing concerns.    Insurance: Payor: MEDICA / Plan: MEDICA VANTAGEPLUS FULLY INSURED / Product Type: HMO /  Changes/Concerns: no      BACKGROUND:     Patient's stated goal/reason for treatment (copy from  or Treatment plan; confirm with patient): \"regulating my emotions and setting boundaries.\"     Provide additional information regarding clinical reasons and goals identified for continued care into next program or level of care? \"I think my main goal is how to prepare myself for going back to normal like (job, school). I also want to continue on setting boundaries and be able to advocate for myself out loud.\"      ASSESSMENT:     Please consult  if any of the following concerns may impact admission/participation in program.    Is there a Safety Plan? yes    Safety status/concerns: Patient is committed to safety, using safety plan, and talking with staff if safety concerns change.      New Information since original Admission SBAR:  Continued Substance Use Status concerns: no  Ongoing Pertinent Medical/Nutritional " concerns: yes Patient reported they lost a lot of weight during programming. 20 pounds less than usual weight. Connected to appetite.   Any concerns related to use of telehealth, ROIs, e-MARTHA, emergency contacts: yes would like to add art therapist MARTHA       Does patient have FMLA or Short-Term Disability requests/plans? no  NOTE: Whenever possible, FMLA or Short-Term Disability paperwork needs to be managed/completed by the patient's community provider.   Exceptions: Patient does not have a community provider AND request is specific to mental health and time off for the duration of the program participation.    Notify RN Triage as soon as possible.     Care Providers/Medication Management Needs:   Is Dr. Persaud providing care?  yes  Is Ismael Terrazas providing care? no  Is there a need (requirement or for continued patient care) for this to continue in next program/level of care: yes patient would like to see Dr. Persaud during IOP.    See information listed in Discharge Instructions or Original Admission Note related to Community or other MH providers.      Provide any additional background information pertinent for continued care (areas of focus and useful approaches): Patient is an engaged, active group member. Patient has been using the skills of cold water, music, or a shower to help cope with feelings of SIB and SI. Patient has been able to commit to safety and follow their safety plan while in [facility]. Patient has expressed interest in self compassion, meditation, and self care. Patient shared they have been trying to push themselves to do more art while at home and starting to think about a future job for themselves. Patient has been working on setting better boundaries with family, specifically with mom. Patient is interested in an EMDR therapist to address trauma history.       RECOMMENDATIONS:     Patient Transfer Completed: yes    Care Team, referrals made/needed: no  PCP: Kenzie Walker  Chandrika  NOTE: Notify RN, as needed, to make internal referrals.                                                             Completed by: Shelby Richter OTR

## 2023-11-27 NOTE — PROGRESS NOTES
LOCUS Worksheet     Name: Maria Alejandra Betts MRN: [id number]    : 2000       PMI:  N/A   Provider Name: Fred   Provider NPI:  [id number]    Actual level of Care Provided:  PHP    Service(s) receiving or referred to:  IOP    Reason for Variance: Pt requires ongoing support managing symptoms.      Rating completed by: Alic[facility] Waters [facility]      I. Risk of Harm:   3      Moderate Risk of Harm    II. Functional Status:   3      Moderate Impairment    III. Co-Morbidity:   2      Minor Co-Morbidity    IV - A. Recovery Environment - Level of Stress:   3      Moderately Stress Environment    IV - B. Recovery Environment - Level of Support:   3      Limited Support in Environment    V. Treatment and Recovery History:   2      Significant Response to Treatment and Recovery Management    VI. Engagement and Recovery Project:   2      Positive Engagement and Recovery       18 Composite Score    Level of Care Recommendation:   17 to 19       High Intensity Community Based Services

## 2023-11-27 NOTE — PROGRESS NOTES
"[facility]   Adult Mental Health Outpatient Programs  Provider Interval History Note    Program: [facility] Hospital Program (track 1)    PATIENT'S NAME: Maria Alejandra Betts  MRN:   [id number]  :   2000  ACCT. NUMBER: [id number]  DATE OF SERVICE: 23    Interval History:  \"I'm doing better.\" Maria Alejandra presents today for follow-up and ongoing program supervision.   Endorses:  \"I feel really good\"  Feels they have \"gotten a lot of growth,\" as regards goals  \"I feel more in tune with my emotions, which makes me feel more confident about them\"  And feels they've gained a better understanding of those emotions  \"I've been good with boundaries,\" hoping to continue to develop skills  Also hoping to continue to explore, \"how to prepare myself with going back to work or going back to school\"  Hoping to start either/both \"soonish\"  Feels ready to discharge from [facility] today as planned  Looking forward to starting day treatment programming tomorrow    Symptoms/Systems:  Sleep: \"It's okay,\" still having night sweats  Appetite: \"It's okay, could be better\"  Daily function/ADLs: improving  Hygiene: no concerns  Socialization: improving  Concerns about work or ability to work: see above    Substance use:  Marijuana once or twice a week  Nicotine all day long every day      Safety Assessment:  Suicidal ideation: denies current or recent suicidal ideation or behavior  Thoughts of non-suicidal self-injury: denied  Recent self-injurious behavior: denied  Homicidal ideation: denied  Other safety concerns: denied    Medications:  Current Outpatient Medications   Medication Sig Dispense Refill    hydrOXYzine (ATARAX) 25 MG tablet Take 1-2 tablets (25-50 mg) by mouth 3 times daily as needed for anxiety (sleep) 30 tablet 0    citalopram (CELEXA) 20 MG tablet Take 1 tablet (20 mg) by mouth daily Start with half tablet daily for 1-2 weeks then whole tablet 90 tablet 1    ibuprofen (ADVIL/MOTRIN) 600 MG " tablet Take 1 tablet (600 mg) by mouth every 6 hours as needed for moderate pain (Patient not taking: Reported on 9/8/2023) 30 tablet 0    propranolol (INDERAL) 10 MG tablet Take 1 tablet (10 mg) by mouth 2 times daily as needed (anxiety) 10 tablet 0       The above list was reviewed and updated in EPIC with patient today.     Patient is taking medications as prescribed and denies adverse effects    Laboratory Results:  Most recent labs reviewed. Pertinent updates/findings: None.     Metrics:  PHQ-9 scores:       12/24/2021    10:37 AM 2/7/2022    11:15 AM 1/26/2023     1:13 PM 11/13/2023    10:46 AM   PHQ-9 SCORE   PHQ-9 Total Score MyChart  22 (Severe depression) 16 (Moderately severe depression)    PHQ-9 Total Score 17 22 16 21       BRUCE-7 scores:       2/7/2022    11:16 AM 11/13/2023    10:46 AM   BRUCE-7 SCORE   Total Score 17 (severe anxiety)    Total Score 17 11       CSSR-S: [place] Suicide Severity Rating Scale (Short Version)      1/31/2022     6:27 PM 11/13/2023    10:00 AM   [place] Suicide Severity Rating (Short Version)   Over the past 2 weeks have you felt down, depressed, or hopeless? yes    Over the past 2 weeks have you had thoughts of killing yourself? yes    Have you ever attempted to kill yourself? no    Q1 Wished to be Dead (Past Month) yes yes   Q2 Suicidal Thoughts (Past Month) yes yes   Q3 Suicidal Thought Method no no   Q4 Suicidal Intent without Specific Plan no no   Q5 Suicide Intent with Specific Plan no no   Q6 Suicide Behavior (Lifetime) no yes   Comments  last year, med overdose - no medical attention sought   Within the Past 3 Months?  no   Level of Risk per Screen low risk moderate risk   Required Interventions Patient searched;Belongings removed;Room made safe    Interventions DEC consulted;Monitored via video          Mental Status Examination:  Vital Signs: There were no vitals taken for this visit.   Appearance: well groomed, appears stated age, and in no apparent  "distress.  Attitude: cooperative   Eye Contact: good   Muscle Strength and Tone: no gross abnormalities   Psychomotor Behavior: normal or unremarkable   Gait and Station: deferred  Speech: normal rate, production, volume, and rhythm of  Associations: No loosening of associations  Thought Process: coherent and goal directed  Thought Content: no evidence of suicidal ideation or homicidal ideation, no evidence of psychotic thought, no auditory hallucinations present, and no visual hallucinations present  Mood: \"better\"  Affect: mood congruent, intensity is normal, constricted mobility, and reactive  Insight: good  Judgment: intact, adequate for safety  Impulse Control: intact  Oriented to: time, place, person, and situation  Attention Span and Concentration: normal  Language: Intact  Recent and Remote Memory: intact to interview. Not formally assessed. No amnesia.  Fund of Knowledge/Assessment of Intelligence: Average  Capacity of Activities of Daily Living: Independent, able to participate in programmatic care services.    Diagnosis/es:    ICD-10-CM    1. Major depressive disorder, recurrent episode, moderate (H)  F33.1 hydrOXYzine (ATARAX) 25 MG tablet      2. BRUCE (generalized anxiety disorder)  F41.1 hydrOXYzine (ATARAX) 25 MG tablet        By history, traumatic brain injury, borderline personality disorder  Have not ruled out 300.01 (F41.0) Panic Disorder    Assessment/Plan:  Maria Alejandra presents today for follow up. Endorses great benefit from PHP. Has reached maximum benefit at this level of care and will discharge from the [facility] hospital program as planned. Enrolling in adult day treatment tomorrow to continue progress towards goals, including eventually getting back to work and school. Patient is not ready for either today.    Good benefit from current medication. Using diphenhydramine for sleep, and continues to endorse \"night sweats.\" The two might be related. Will prescribe hydroxyzine to hopefully gain " similar benefit for sleep but hopefully with fewer anticholinergic effects. No other medication changes today. Follow up in 1 month with me.    Depression   Overall improved   Trial of hydroxyzine for sleep  No other medication changes  Discharge from [facility] today  Enroll in ADT tomorrow    Anxiety   Overall improved  See above    BPD by history   Overall improved  See above    Continue all other medications as reviewed per electronic medical record today    Continue therapy as planned:  Enrolled in PHP  Has reached maximum benefit at this level of care  Continues to meet criteria for adult day treatment level of care  I feel this patient does not meet criteria for an involuntary hold and is appropriate for treatment at an outpatient level of care.  Continue with individual therapist as appropriate    Safety plan reviewed:  To the Emergency Department as needed or call after hours crisis line at [phone] or [phone]. [place] Crisis Text Line: Text [place] to 411206 or Suicide LifeLine Chat: suicidepreventionEventiozline.org/chat    Follow-up:   schedule an appointment with me or another program provider in approximately in 4 week(s) or sooner if needed.  Can speak with a staff member or call the appropriate program number (see below) to schedule  Follow up with outpatient provider(s) as planned or sooner if needed for acute medical concerns.    Questions or concerns:  Call program line with questions or concerns (see below)  Lexplique - /l?k â€¢ splik/t may be used to communicate with your provider, but this is not intended to be used for emergencies.    [facility] Adult Mental Health Program lines:  [facility]: [phone]  Dual Disorder: [phone]  Adult Day Treatment:  [phone]  55+/Intensive Outpatient: [phone]    Community Resources:    National Suicide Prevention Lifeline: 988 from any phone, or 188-838-5966 (TTY: 606.995.8648). Call anytime for help.  (www.suicidepreventionlifeline.org)  National [place]  on Mental Illness (www.[place].org): 063-222-1960 or 501-844-4413.   Mental Health Association (www.mentalhealth.org): 821.907.9975 or 457-836-3197.  [place] Crisis Text Line: Text [place] to 340275  Suicide LifeLine Chat: suicidepreventionlifeline.org/chat    Treatment Objective(s) Addressed in This Session:  The purpose of today's virtual visit is for this writer to provide oversight of patient's care while receiving program services. Specific treatment goals addressed included personal safety, symptoms stabilization and management, wellness and mental health, and community resources/discharge planning.     This author or another program provider will follow up with the patient as noted above.     Patient agrees with the current plan of care.    Igor Persaud MD  11/27/23      Visit Details:  Type of service: In-person    Location (patient and provider): [facility] Adult Mental Health Outpatient Programmatic Care Offices    Level of Medical Decision Making:   - At least 1 chronic problem that is not stable  - Engaged in prescription drug management during visit (discussed any medication benefits, side effects, alternatives, etc.)  Discussion of management or test interpretation with external physician/other qualified healthcare professional/appropriate source - PHP multidisciplinary treatment team    This document completed in part using Dragon Medical One dictation software.  Please excuse any inadvertent word or phrase substitutions.

## 2023-11-27 NOTE — GROUP NOTE
Psychotherapy Group Note    PATIENT'S NAME: Maria Alejandra Betts  MRN:   [id number]  :   2000  ACCT. NUMBER: [id number]  DATE OF SERVICE: 23  START TIME: 10:00 AM  END TIME: 10:50 AM  FACILITATOR: Alicia Waters LPCC  TOPIC: MH EBP Group: Cognitive Restructuring  [facility] Adult Partial Hospitalization Program  TRACK: 2    NUMBER OF PARTICIPANTS: 6    Summary of Group / Topics Discussed:  Cognitive Restructuring: Distortions: Patients received an overview of how to identify common cognitive distortions. Patients will explore alternatives to cognitive distortions and practice challenging their negative thought patterns. The goal is to help patients target modify ineffective thought patterns.     Patient Session Goals / Objectives:  Familiarized self with ineffective / unhealthy thoughts and how they develop.    Explored impact of ineffective thoughts / distortions on mood and activity  Formulated new neutral/positive alternatives to challenge less helpful / ineffective thoughts.  Practiced and plan to apply in daily life             Patient Participation / Response:  Fully participated with the group by sharing personal reflections / insights and openly received / provided feedback with other participants.    Demonstrated understanding of topics discussed through group discussion and participation, Expressed understanding of the relationship between behaviors, thoughts, and feelings, Demonstrated knowledge of personal thought patterns and how they impact their mood and behavior., Verbalized understanding of patient's own thought patterns and how they impact their mood and behaviors, and Practiced skills in session    Treatment Plan:  Patient has See Epic Treatment Plan - Patient is discharging.    CLEMENT Calderon

## 2023-11-27 NOTE — DISCHARGE SUMMARY
"       Adult Mental Health Intensive Outpatient Discharge Summary/Instructions      Patient: Maria Alejandra Betts MRN: [id number]   : 2000 Age: 23 year old Sex: female     Admission Date: 2023  Discharge Date: 2023  Diagnosis:   1. Major depressive disorder, recurrent episode, moderate (H)  F33.1         2. Generalized anxiety disorder  F41.1           By history, traumatic brain injury, borderline personality disorder  Have not ruled out 300.01 (F41.0) Panic Disorder    Focus of Treatment / Progress    Personal Safety: Patient is committed to safety, using safety plan, and talking to staff if safety concerns change.      * Follow your safety plan     * Call crisis lines as needed:    [facility] [phone] [facility] [phone]  [facility] [phone] Crisis Connection [phone]  [facility] [phone] [facility] [phone]  [facility] [phone] National Suicide Prevention [phone]  [facility] [phone] Suicide Prevention [phone]  [facility] [phone]    Managing symptoms of:  depression, anxiety, trauma    Community support/health:  National [place] on Mental Illness   Https://namimn.org/    [place] Warm line Peer support  https://mentalhealthmn.org/support/[place]-warmline/   444-832-2747  Text \"Support\" to 30703    [facility] Help line  [phone]     Managing Symptoms and Preventing Relapse    * Go to all of your appointments    * Take all medications as directed.      * Carry a current list if medication with you    * Do not use illicit (street) drugs.  Avoid alcohol    * Report these symptoms to your care team. These are early signs of relapse:   Thoughts of suicide   Losing more sleep   Increased confusion   Mood getting worse   Feeling more aggressive   Other:  panic attacks, isolation, suicidal thoughts    *Use these skills daily:  Create a routine or structure that works for your needs, practice mindfulness, " move your body intentionally every day, eat regular balanced meals daily, reach out to supports, and practice self-compassion.      Copy of summary sent to: scottie Alexandra    Follow up with psychiatrist / main caregiver: Interested in going back to psychiatrist at [facility] after [facility]. In the mean time, will see Dr. Persaud at [facility].     Psychological testing at [facility] - referral placed by Dr. Persaud       Follow up with your therapist: Art Therapist at [facility]   Next visit: 12/8/2023    Go to group therapy and / or support groups at: 11/28/2023 -  [facility]/DT 5. (Monday - Thursday 1-4 PM)    See your medical doctor about:  Annual physical exams and any medical concerns that may arise.      Other:  Thank You!  Thank you for choosing [facility] for your care - it was an honor to serve you. One way we continue to improve is by listening to patients and family members. If you receive a survey, please take the time to complete it and share your experience with us. Our goal is to always provide great care and an excellent experience. We hope you feel comfortable recommending our services to your family and friends.       Client Signature:_______________________  Date / Time:___________  Staff Signature:________________________   Date / Time:___________

## 2023-11-27 NOTE — GROUP NOTE
Psychoeducation Group Note    PATIENT'S NAME: Maria Alejandra Betts  MRN:   [id number]  :   2000  ACCT. NUMBER: [id number]  DATE OF SERVICE: 23  START TIME:  2:00 PM  END TIME:  2:50 PM  FACILITATOR: Belén Herring RN  TOPIC:  Wellness Group: Mental Health Maintenance  [facility] Adult Partial Hospitalization Program  TRACK: PHP2    NUMBER OF PARTICIPANTS: 6    Summary of Group / Topics Discussed:  Mental Health Maintenance:  Assessments of Strengths: Patients completed a self-reflection on personal strengths worksheet. The concept of personal strength as it relates to resilience were explored. Patients shared responses with the group and participated in discussion.     Patient Session Goals / Objectives:  Patients identified 1-3 qualities they consider a personal strength.  Patients understood the concept of personal strengths and the connection it has to resiliency      Patient Participation / Response:  Fully participated with the group by sharing personal reflections / insights and openly received / provided feedback with other participants.    Verbalized understanding of mental health maintenance topic    Treatment Plan:  Patient has a current master individualized treatment plan.  See Epic treatment plan for more information.    Belén Herring RN

## 2023-11-28 ENCOUNTER — TELEPHONE (OUTPATIENT)
Dept: BEHAVIORAL HEALTH | Facility: CLINIC | Age: 23
End: 2023-11-28
Payer: COMMERCIAL

## 2023-11-28 ENCOUNTER — HOSPITAL ENCOUNTER (OUTPATIENT)
Dept: BEHAVIORAL HEALTH | Facility: CLINIC | Age: 23
Discharge: HOME | End: 2023-11-28
Attending: PSYCHIATRY & NEUROLOGY
Payer: COMMERCIAL

## 2023-11-28 PROBLEM — F33.1 MDD (MAJOR DEPRESSIVE DISORDER), RECURRENT EPISODE, MODERATE (H): Status: ACTIVE | Noted: 2023-11-28

## 2023-11-28 PROCEDURE — 90853 GROUP PSYCHOTHERAPY: CPT | Performed by: SOCIAL WORKER

## 2023-11-28 PROCEDURE — 90853 GROUP PSYCHOTHERAPY: CPT

## 2023-11-28 ASSESSMENT — ANXIETY QUESTIONNAIRES
3. WORRYING TOO MUCH ABOUT DIFFERENT THINGS: SEVERAL DAYS
IF YOU CHECKED OFF ANY PROBLEMS ON THIS QUESTIONNAIRE, HOW DIFFICULT HAVE THESE PROBLEMS MADE IT FOR YOU TO DO YOUR WORK, TAKE CARE OF THINGS AT HOME, OR GET ALONG WITH OTHER PEOPLE: VERY DIFFICULT
5. BEING SO RESTLESS THAT IT IS HARD TO SIT STILL: SEVERAL DAYS
2. NOT BEING ABLE TO STOP OR CONTROL WORRYING: MORE THAN HALF THE DAYS
6. BECOMING EASILY ANNOYED OR IRRITABLE: SEVERAL DAYS
7. FEELING AFRAID AS IF SOMETHING AWFUL MIGHT HAPPEN: MORE THAN HALF THE DAYS
1. FEELING NERVOUS, ANXIOUS, OR ON EDGE: MORE THAN HALF THE DAYS
GAD7 TOTAL SCORE: 10
GAD7 TOTAL SCORE: 10

## 2023-11-28 ASSESSMENT — PATIENT HEALTH QUESTIONNAIRE - PHQ9
5. POOR APPETITE OR OVEREATING: SEVERAL DAYS
SUM OF ALL RESPONSES TO PHQ QUESTIONS 1-9: 20

## 2023-11-28 NOTE — GROUP NOTE
Psychotherapy Group Note    PATIENT'S NAME: Maria Alejandra Betts  MRN:   [id number]  :   2000  ACCT. NUMBER: [id number]  DATE OF SERVICE: 23  START TIME:  3:00 PM  END TIME:  3:50 PM  FACILITATOR: Macarena Davies LGSW  TOPIC: MH EBP Group: Specialty [facility] Adult Mental Health Outpatient Programs  TRACK: IOP/DT 5    NUMBER OF PARTICIPANTS: 6    Summary of Group / Topics Discussed:  Specialty Topics: Life Transitions: The topic of life transitions was presented in order to help patients to better understand the challenges presented by life transitions, and how to best navigate them. Exploring the phases of transition and how one works through them was discussed. Patients were provided with information regarding community resources.     Patient Session Goals / Objectives:  Discussed the timing and nature of major life transitions  Explored how life transitions may impact mental health and functioning  Discussed coping strategies to manage symptoms and help with transitioning  Discussed and planned a successful transition      Patient Participation / Response:  Fully participated with the group by sharing personal reflections / insights and openly received / provided feedback with other participants.    Demonstrated understanding of topics discussed through group discussion and participation, Identified / Expressed readiness to act on skill suggestions discussed in topic, and Verbalized understanding of ways to proactively manage illness    Treatment Plan:  Patient has an initial individualized treatment plan that was created as part of their diagnostic assessment / admission process.  A master individualized treatment plan is in the process of being developed with the patient and multi-disciplinary care team.    DELTA Crawley

## 2023-11-28 NOTE — TELEPHONE ENCOUNTER
"----- Message from Shelby Richter OTR sent at 11/27/2023  1:44 PM CST -----  Regarding: Patient discharging PHP today, end of day.  Patient is discharging PHP today, end of day. Patient is starting IOP/DT 5 on 11/28/2023.  Adult Mental Health Programmatic Care Schedule Request    Adult Mental Health Programmatic Care Schedule Request    Patient Name: Maria Alejandra Betts  Location of programming: [facility]  Start Date: 11/28/2023      Adult Program Group: Adult Program Group: IOP/DT 5 ( [id number]) - Mondays-Thursdays 1-4 pm for 9 weeks    Attending Provider (MD):  Dr Igor Persaud.  Visit Type:  In-Person    Accommodations Needed: NA  Alerts Identified/Substantiation: NA  Consulted with Supervisor: Yes    # of visits: 36  Duration: 180     Send to:   [UR [facility] (10000)] ##ONLY if Start Date is determined##  NG 14 OBC's (BEH BEHAVIORAL OUTPATIENT ASSESSMENTS [26580])   Isabel Griffith ([facility])  Luci Candelario (PHP)  Eugenie Marshall            TRANSFER SBAR NOTE  Adult MH Outpatient Programs        SITUATION:      Admission Date: 11/27/2023     Patient name:            Maria Alejandra Betts  Preferred name: Maria Alejandra she/they 23 year old  Diagnosis/-es (copy from DA, including ICD-10):     1. Major depressive disorder, recurrent episode, moderate (H)  F33.1       2. Generalized anxiety disorder  F41.1          By history, traumatic brain injury, borderline personality disorder  Have not ruled out 300.01 (F41.0) Panic Disorder     New assigned Program/Track: IOP/DT 5  Is this a new Level of Care? yes  NOTE: LOC: PHP, IOP/Day Tx, \"Clinic\". If yes, LOCUS and Discharge Summary required.      Reviewed patient's schedule and informed them of any variation due to late days (PHP) or Holidays. yes  Does the patient have any planned absences and/or barriers to admission/treatment? no  NOTE: impact of transportation, technology, childcare, work, or housing concerns.     Insurance: Payor: MEDICA / Plan: MEDICA VANTAGEPLUS FULLY INSURED / Product Type: HMO " "/  Changes/Concerns: no        BACKGROUND:      Patient's stated goal/reason for treatment (copy from DA or Treatment plan; confirm with patient): \"regulating my emotions and setting boundaries.\"      Provide additional information regarding clinical reasons and goals identified for continued care into next program or level of care? \"I think my main goal is how to prepare myself for going back to normal like (job, school). I also want to continue on setting boundaries and be able to advocate for myself out loud.\"        ASSESSMENT:      Please consult  if any of the following concerns may impact admission/participation in program.     Is there a Safety Plan? yes     Safety status/concerns: Patient is committed to safety, using safety plan, and talking with staff if safety concerns change.       New Information since original Admission SBAR:  Continued Substance Use Status concerns: no  Ongoing Pertinent Medical/Nutritional concerns: yes Patient reported they lost a lot of weight during programming. 20 pounds less than usual weight. Connected to appetite.   Any concerns related to use of telehealth, ROIs, e-MARTHA, emergency contacts: yes would like to add art therapist MARTHA         Does patient have FMLA or Short-Term Disability requests/plans? no  NOTE: Whenever possible, FMLA or Short-Term Disability paperwork needs to be managed/completed by the patient's community provider.   Exceptions: Patient does not have a community provider AND request is specific to mental health and time off for the duration of the program participation.    Notify RN Triage as soon as possible.      Care Providers/Medication Management Needs:   Is Dr. Persaud providing care?  yes  Is Ismael Terrazas providing care? no  Is there a need (requirement or for continued patient care) for this to continue in next program/level of care: yes patient would like to see Dr. Persaud during IOP.     See information listed in " Discharge Instructions or Original Admission Note related to Community or other  providers.       Provide any additional background information pertinent for continued care (areas of focus and useful approaches): Patient is an engaged, active group member. Patient has been using the skills of cold water, music, or a shower to help cope with feelings of SIB and SI. Patient has been able to commit to safety and follow their safety plan while in [facility]. Patient has expressed interest in self compassion, meditation, and self care. Patient shared they have been trying to push themselves to do more art while at home and starting to think about a future job for themselves. Patient has been working on setting better boundaries with family, specifically with mom. Patient is interested in an EMDR therapist to address trauma history.         RECOMMENDATIONS:      Patient Transfer Completed: yes     Care Team, referrals made/needed: no  PCP: Kenzie Walker  NOTE: Notify RN, as needed, to make internal referrals.                                                              Completed by: LOUIS Morin

## 2023-11-28 NOTE — TELEPHONE ENCOUNTER
"----- Message from Shelby Richter OTR sent at 11/27/2023  1:44 PM CST -----  Regarding: Patient discharging PHP today, end of day.  Patient is discharging PHP today, end of day. Patient is starting IOP/DT 5 on 11/28/2023.  Adult Mental Health Programmatic Care Schedule Request    Adult Mental Health Programmatic Care Schedule Request    Patient Name: Maria Alejandra Betts  Location of programming: [facility]  Start Date: 11/28/2023      Adult Program Group: Adult Program Group: IOP/DT 5 ( [id number]) - Mondays-Thursdays 1-4 pm for 9 weeks    Attending Provider (MD):  Dr Igor Persaud.  Visit Type:  In-Person    Accommodations Needed: NA  Alerts Identified/Substantiation: NA  Consulted with Supervisor: Yes    # of visits: 36  Duration: 180     Send to:   [[facility] (15708)] ##ONLY if Start Date is determined##  NG [facility] O[facility]'s (BEH BEHAVIORAL OUTPATIENT ASSESSMENTS [16879])   Isabel Griffith ([facility])  Luci Candelario (PHP)  Eugenie Marshall            TRANSFER SBAR NOTE  Adult MH Outpatient Programs        SITUATION:      Admission Date: 11/27/2023     Patient name:            Maria Alejandra Betts  Preferred name: Maria Alejandra she/they 23 year old  Diagnosis/-es (copy from DA, including ICD-10):     1. Major depressive disorder, recurrent episode, moderate (H)  F33.1       2. Generalized anxiety disorder  F41.1          By history, traumatic brain injury, borderline personality disorder  Have not ruled out 300.01 (F41.0) Panic Disorder     New assigned Program/Track: IOP/DT 5  Is this a new Level of Care? yes  NOTE: LOC: PHP, IOP/Day Tx, \"Clinic\". If yes, LOCUS and Discharge Summary required.      Reviewed patient's schedule and informed them of any variation due to late days (PHP) or Holidays. yes  Does the patient have any planned absences and/or barriers to admission/treatment? no  NOTE: impact of transportation, technology, childcare, work, or housing concerns.     Insurance: Payor: MEDICA / Plan: MEDICA VANTAGEPLUS FULLY INSURED / Product Type: HMO " "/  Changes/Concerns: no        BACKGROUND:      Patient's stated goal/reason for treatment (copy from DA or Treatment plan; confirm with patient): \"regulating my emotions and setting boundaries.\"      Provide additional information regarding clinical reasons and goals identified for continued care into next program or level of care? \"I think my main goal is how to prepare myself for going back to normal like (job, school). I also want to continue on setting boundaries and be able to advocate for myself out loud.\"        ASSESSMENT:      Please consult  if any of the following concerns may impact admission/participation in program.     Is there a Safety Plan? yes     Safety status/concerns: Patient is committed to safety, using safety plan, and talking with staff if safety concerns change.       New Information since original Admission SBAR:  Continued Substance Use Status concerns: no  Ongoing Pertinent Medical/Nutritional concerns: yes Patient reported they lost a lot of weight during programming. 20 pounds less than usual weight. Connected to appetite.   Any concerns related to use of telehealth, ROIs, e-MARTHA, emergency contacts: yes would like to add art therapist MARTHA         Does patient have FMLA or Short-Term Disability requests/plans? no  NOTE: Whenever possible, FMLA or Short-Term Disability paperwork needs to be managed/completed by the patient's community provider.   Exceptions: Patient does not have a community provider AND request is specific to mental health and time off for the duration of the program participation.    Notify RN Triage as soon as possible.      Care Providers/Medication Management Needs:   Is Dr. Persaud providing care?  yes  Is Ismael Terrazas providing care? no  Is there a need (requirement or for continued patient care) for this to continue in next program/level of care: yes patient would like to see Dr. Persaud during IOP.     See information listed in " Discharge Instructions or Original Admission Note related to Community or other  providers.       Provide any additional background information pertinent for continued care (areas of focus and useful approaches): Patient is an engaged, active group member. Patient has been using the skills of cold water, music, or a shower to help cope with feelings of SIB and SI. Patient has been able to commit to safety and follow their safety plan while in [facility]. Patient has expressed interest in self compassion, meditation, and self care. Patient shared they have been trying to push themselves to do more art while at home and starting to think about a future job for themselves. Patient has been working on setting better boundaries with family, specifically with mom. Patient is interested in an EMDR therapist to address trauma history.         RECOMMENDATIONS:      Patient Transfer Completed: yes     Care Team, referrals made/needed: no  PCP: Kenzie Walker  NOTE: Notify RN, as needed, to make internal referrals.                                                              Completed by: LOUIS Morin

## 2023-11-28 NOTE — GROUP NOTE
Psychoeducation Group Note    PATIENT'S NAME: Maria Alejandra Betts  MRN:   [id number]  :   2000  ACCT. NUMBER: [id number]  DATE OF SERVICE: 23  START TIME:  2:00 PM  END TIME:  2:50 PM  FACILITATOR: Alicia Brock [facility]; Amena Negrete RN  TOPIC:  Wellness Group: [facility] Adult Mental Health Outpatient Programs  TRACK: IOP/DT 5    NUMBER OF PARTICIPANTS: 6    Summary of Group / Topics Discussed:  Foundations of Health: Sleep: Pathophysiology of sleep disorders: The anatomy of sleep was reviewed including structures, stages, mechanisms, and the purpose that sleep has on the brain. Sleep disorders were discussed within the group with a focus on gaining knowledge about the etiology and pathophysiology of insomnia, sleep apnea, restless leg syndrome, narcolepsy, medications that can cause risk for sleeping disorders, and other symptoms/factors that may interfere with sleep. Risk factors for developing sleep disorders were discussed and treatments/pharmacological options were explored.     Patient Session Goals / Objectives:  Described the effect and purpose of sleep on the brain and identify the amount of sleep needed  Identified differences between sleep disorders and risks associated with sleep disorders  Described the connection between sleep disturbances and mental illness  Increased knowledge about treatments for sleep disorders      Patient Participation / Response:  Moderately participated, sharing some personal reflections / insights and adequately adequately received / provided feedback with other participants.    Demonstrated understanding of topics discussed through group discussion and participation    Treatment Plan:  Patient has an initial individualized treatment plan that was created as part of their diagnostic assessment / admission process.  A master individualized treatment plan is in the process of being developed with the patient and  multi-disciplinary care team.    Alicia Brock, LICSW

## 2023-11-29 ENCOUNTER — HOSPITAL ENCOUNTER (OUTPATIENT)
Dept: BEHAVIORAL HEALTH | Facility: CLINIC | Age: 23
Discharge: HOME | End: 2023-11-29
Attending: PSYCHIATRY & NEUROLOGY
Payer: COMMERCIAL

## 2023-11-29 PROCEDURE — 90853 GROUP PSYCHOTHERAPY: CPT

## 2023-11-29 PROCEDURE — 90853 GROUP PSYCHOTHERAPY: CPT | Performed by: SOCIAL WORKER

## 2023-11-29 NOTE — GROUP NOTE
Process Group Note    PATIENT'S NAME: Maria Alejandra Betts  MRN:   [id number]  :   2000  ACCT. NUMBER: [id number]  DATE OF SERVICE: 23  START TIME:  1:00 PM  END TIME:  1:50 PM  FACILITATOR: Alicia Brock LICSW  TOPIC:  Process Group    Diagnoses:  1. Major depressive disorder, recurrent episode, moderate (H)  F33.1         2. Generalized anxiety disorder  F41.1         By history, traumatic brain injury, borderline personality disorder  Have not ruled out 300.01 (F41.0) Panic Disorder    [facility] Adult Mental Health Outpatient Programs  TRACK: IOP/DT 5    NUMBER OF PARTICIPANTS: 6    Data:    Session content: At the start of this group, patients were invited to check in by identifying themselves, describing their current emotional status, and identifying issues to address in this group.   Area(s) of treatment focus addressed in this session included Develop / Improve Independent Living Skills and Develop Socialization / Interpersonal Relationship Skills.    Therapeutic Interventions/Treatment Strategies:  Psychotherapist offered support, feedback and validation and reinforced use of skills. Treatment modalities used include Cognitive Behavioral Therapy and Dialectical Behavioral Therapy. Interventions include Behavioral Activation: Reinforced benefits/challenges of change process through applying skills to replace unwanted behaviors and Explored how behaviors effect mood and interact with thoughts and feelings, Cognitive Restructuring:  Facilitated recognition of the connection between negative thoughts and negative core beliefs, and Relationship Skills: Discussed strategies to promote healthier understanding of interpersonal relationships and Discussed relationships and ways to reduce conflict .    Assessment:    Patient response:   Patient responded to session by accepting feedback, giving feedback, listening, being attentive, accepting support, and appearing alert    Possible  barriers to participation / learning include: and no barriers identified    Health Issues:   None reported       Substance Use Review:   Substance Use: No active concerns identified.    Mental Status/Behavioral Observations  Appearance:   Appropriate   Eye Contact:   Good   Psychomotor Behavior: Normal   Attitude:   Cooperative  Interested  Orientation:   All  Speech   Rate / Production: Normal    Volume:  Normal   Mood:    Anxious   Affect:    Appropriate   Thought Content:   Clear  Thought Form:  Coherent  Logical     Insight:    Good     Plan:   Safety Plan: No current safety concerns identified.  Recommended that patient call 911 or go to the local ED should there be a change in any of these risk factors.   Barriers to treatment: None identified  Patient Contracts (see media tab):  None  Substance Use: Not addressed in session   Continue or Discharge: Patient will continue in Adult Day Treatment (ADT)  as planned. Patient is likely to benefit from learning and using skills as they work toward the goals identified in their treatment plan.    Alicia Brock, [facility]  November 29, 2023

## 2023-11-29 NOTE — GROUP NOTE
Psychotherapy Group Note    PATIENT'S NAME: Maria Alejandra Betts  MRN:   [id number]  :   2000  ACCT. NUMBER: [id number]  DATE OF SERVICE: 23  START TIME:  3:00 PM  END TIME:  3:50 PM  FACILITATOR: Navya Verduzco LGSW; Bruna James LICSW  TOPIC: MH EBP Group: Specialty [facility] Adult Mental Health Outpatient Programs  TRACK: IOP/DT 5     NUMBER OF PARTICIPANTS: 6    Summary of Group / Topics Discussed:  Specialty Topics: Exercise and Mental Health: Discussed the impact of exercise on reducing mental health symptoms. Explore how group members can exercise or already exercise in ways that meet their physical, financial, etc. needs. Identify barriers to engaging in exercise and ways to overcome those barriers.      Patient Session Goals / Objectives:      Verbalized and explored how exercise reduces mental health symptoms.     Identify ways to exercise in ways that meet group member's unique needs and lives.     Identified barriers to engaging in exercise and brain storm ways to overcome them.       Patient Participation / Response:  Fully participated with the group by sharing personal reflections / insights and openly received / provided feedback with other participants.    Demonstrated understanding of topics discussed through group discussion and participation    Treatment Plan:  Patient has a current master individualized treatment plan.  See Epic treatment plan for more information.    DELTA Cool

## 2023-11-30 ENCOUNTER — TELEPHONE (OUTPATIENT)
Dept: BEHAVIORAL HEALTH | Facility: CLINIC | Age: 23
End: 2023-11-30
Payer: COMMERCIAL

## 2023-12-01 NOTE — TELEPHONE ENCOUNTER
Re: IOP/DT 5    Maria Alejandra called in sick to the  and said yes when asked about a call from the treatment team. Writer left a voicemail for patient around 4pm offering to check in on Friday as needed.    DELTA Crawley on 11/30/2023 at 4:00 PM

## 2023-12-04 ENCOUNTER — HOSPITAL ENCOUNTER (OUTPATIENT)
Dept: BEHAVIORAL HEALTH | Facility: CLINIC | Age: 23
Discharge: HOME | End: 2023-12-04
Attending: PSYCHIATRY & NEUROLOGY
Payer: COMMERCIAL

## 2023-12-04 PROCEDURE — 90853 GROUP PSYCHOTHERAPY: CPT | Performed by: PSYCHOLOGIST

## 2023-12-04 PROCEDURE — 90853 GROUP PSYCHOTHERAPY: CPT | Performed by: SOCIAL WORKER

## 2023-12-04 NOTE — GROUP NOTE
Psychoeducation Group Note    PATIENT'S NAME: Maria Alejandra Betts  MRN:   [id number]  :   2000  ACCT. NUMBER: [id number]  DATE OF SERVICE: 23  START TIME:  2:00 PM  END TIME:  2:50 PM  FACILITATOR: Alicia Brock LICSW; Massiel Nathan OT  TOPIC: MH Life Skills Group: Resiliency Development  [facility] Mental Health Outpatient Programs  TRACK: IOP/DT 5    NUMBER OF PARTICIPANTS: 6    Summary of Group / Topics Discussed:  Resiliency Development:  Strengths Inventory: Patients were given the opportunity to reflect on and identify their own individual strengths and the strengths of their peers.  Patients engaged in supportive discussion regarding the benefits and barriers of accessing a strengths-based approach to mental health recovery, and how they might use this approach in the future.  Patients identified personal strengths, assets, and resources that support their overall wellbeing.  Patients were also given the opportunity to assist group members in identifying and reflecting on their individual strengths. Validation, support, and feedback were provided from staff throughout group.      Patient Session Goals / Objectives:  Identified individual strengths, assets, and resources that may contribute to mental health recovery and overall wellbeing  Improved awareness of potential benefits and barriers to accessing a strengths-based approach to healing  Supported peers in identifying their own inventory of personal strengths to utilize in support of goals and meaningful activities       Patient Participation / Response:  Fully participated with the group by sharing personal reflections / insights and openly received / provided feedback with other participants.    Verbalized understanding of content    Treatment Plan:  Patient has a current master individualized treatment plan.  See Epic treatment plan for more information.    ELLIE June

## 2023-12-04 NOTE — GROUP NOTE
Psychotherapy Group Note    PATIENT'S NAME: Maria Alejandra Betts  MRN:   [id number]  :   2000  ACCT. NUMBER: [id number]  DATE OF SERVICE: 23  START TIME:  3:00 PM  END TIME:  3:50 PM  FACILITATOR: Farheen Edwards Psy.D, LP  TOPIC:  EBP Group: Coping Skills  [facility] Mental Health Outpatient Programs  TRACK: iop/dt 5   B2    NUMBER OF PARTICIPANTS: 6    Summary of Group / Topics Discussed:  Coping Skills: Self-Soothe: Patients learned to apply self-soothe as a way to decrease heightened stress in the moment.  Patients identified situations that necessitate self-soothe strategies.  They focused on ways to manage physical symptoms of distress using the senses. They discussed how to distinguish when this can be useful in their lives when other strategies are more relevant or helpful.    Patient Session Goals / Objectives:  Understand the purpose of using the senses to decrease distress  Process what happens in the body when using self-soothe strategies  Demonstrate understanding of when to use self-soothe strategies  Identify and problem solve barriers to applying self-soothe strategies.  Choose 1-2 self-soothe strategies to apply during times of distress.      Patient Participation / Response:  Fully participated with the group by sharing personal reflections / insights and openly received / provided feedback with other participants.    Expressed understanding of the relevance / importance of coping skills at distressing times in life    Treatment Plan:  Patient has a current master individualized treatment plan.  See Epic treatment plan for more information.    Farheen Edwards Psy.D, OSMAN

## 2023-12-04 NOTE — GROUP NOTE
Process Group Note    PATIENT'S NAME: Maria Alejandra Betts  MRN:   [id number]  :   2000  ACCT. NUMBER: [id number]  DATE OF SERVICE: 23  START TIME:  1:00 PM  END TIME:  1:50 PM  FACILITATOR: Alicia Brock LICSW  TOPIC:  Process Group    Diagnoses:  1. Major depressive disorder, recurrent episode, moderate (H)  F33.1         2. Generalized anxiety disorder  F41.1            By history, traumatic brain injury, borderline personality disorder  Have not ruled out 300.01 (F41.0) Panic Disorder    [facility] Mental Health Outpatient Programs  TRACK: IOP/DT 5    NUMBER OF PARTICIPANTS: 5    Data:    Session content: At the start of this group, patients were invited to check in by identifying themselves, describing their current emotional status, and identifying issues to address in this group.   Area(s) of treatment focus addressed in this session included Develop / Improve Independent Living Skills and Develop Socialization / Interpersonal Relationship Skills.    Therapeutic Interventions/Treatment Strategies:  Psychotherapist offered support, feedback and validation and reinforced use of skills. Treatment modalities used include Cognitive Behavioral Therapy and Dialectical Behavioral Therapy. Interventions include Behavioral Activation: Explored how behaviors effect mood and interact with thoughts and feelings and Encouraged strategies to reduce individual procrastination and increase motivation by increasing goal-directed activities to enhance mood and reduce symptoms., Coping Skills: Facilitated discussion on learning and applying radical acceptance skill and Facilitated understanding of  what factors may contribute to symptom relapse and skills plan to manage symptom relapse , and Relationship Skills: Encouraged development and maintenance  of healthy boundaries.    Assessment:    Patient response:   Patient responded to session by accepting feedback, giving feedback, listening,  being attentive, accepting support, and appearing alert    Possible barriers to participation / learning include: and no barriers identified    Health Issues:   None reported       Substance Use Review:   Substance Use: No active concerns identified.    Mental Status/Behavioral Observations  Appearance:   Appropriate   Eye Contact:   Good   Psychomotor Behavior: Normal   Attitude:   Cooperative   Orientation:   All  Speech   Rate / Production: Normal    Volume:  Normal   Mood:    Dysphoric  Affect:    Appropriate   Thought Content:   Clear  Thought Form:  Coherent  Logical     Insight:    Good     Plan:   Safety Plan: No current safety concerns identified.  Recommended that patient call 911 or go to the local ED should there be a change in any of these risk factors.   Barriers to treatment: None identified  Patient Contracts (see media tab):  None  Substance Use: Not addressed in session   Continue or Discharge: Patient will continue in Adult Day Treatment (ADT)  as planned. Patient is likely to benefit from learning and using skills as they work toward the goals identified in their treatment plan.    Alicia Brock, [facility]  December 4, 2023

## 2023-12-05 ENCOUNTER — HOSPITAL ENCOUNTER (OUTPATIENT)
Dept: BEHAVIORAL HEALTH | Facility: CLINIC | Age: 23
Discharge: HOME | End: 2023-12-05
Attending: PSYCHIATRY & NEUROLOGY
Payer: COMMERCIAL

## 2023-12-05 PROCEDURE — 90853 GROUP PSYCHOTHERAPY: CPT | Performed by: PSYCHOLOGIST

## 2023-12-05 PROCEDURE — 90853 GROUP PSYCHOTHERAPY: CPT | Performed by: SOCIAL WORKER

## 2023-12-05 NOTE — GROUP NOTE
Psychotherapy Group Note    PATIENT'S NAME: Maria Alejandra Betts  MRN:   [id number]  :   2000  ACCT. NUMBER: [id number]  DATE OF SERVICE: 23  START TIME:  3:00 PM  END TIME:  3:50 PM  FACILITATOR: Farheen Edwards Psy.D, LP  TOPIC:  EBP Group: Self-Awareness  [facility] Mental Health Outpatient Programs  TRACK: iop/dt5  B2    NUMBER OF PARTICIPANTS: 6    Summary of Group / Topics Discussed:  Self-Awareness: Grief: Patients were provided with an overview of how personal losses impact their thoughts, feelings, and behaviors. Different stages of grief were discussed, with a focus on the personal and individual experiences of grief as a natural response to loss. The relationship between grief, depression, and anxiety was also discussed. Patients were provided with information regarding different ways of processing grief and shared their personal experiences.     Patient Session Goals / Objectives:  Defined and explored the concept of grief and the grieving process  Discussed relationship between grief, depression, and anxiety   Normalized and recognized the purpose/benefits of the grieving process  Discussed management of the thoughts and feelings associated with grief      Patient Participation / Response:  Fully participated with the group by sharing personal reflections / insights and openly received / provided feedback with other participants.    Demonstrated understanding of values, strengths, and challenges to learn about themselves  The group discussed mindfulness, self-compassion, self-forgiveness. The group wrote letters to their symptoms and shared the ideas with others.   Treatment Plan:  Patient has a current master individualized treatment plan.  See Epic treatment plan for more information.    Farheen Edwards Psy.D, OSMAN

## 2023-12-05 NOTE — GROUP NOTE
Psychoeducation Group Note    PATIENT'S NAME: Maria Alejandra Betts  MRN:   [id number]  :   2000  ACCT. NUMBER: [id number]  DATE OF SERVICE: 23  START TIME:  2:00 PM  END TIME:  2:50 PM  FACILITATOR: Alicia Brock LICSW; Amena Negrete RN  TOPIC:  Wellness Group: Medication Education and Management  [facility] Mental Health Outpatient Programs  TRACK: IOP/DT 5    NUMBER OF PARTICIPANTS: 6    Summary of Group / Topics Discussed:  Medication Educations and Management:  Medication Categories: Patient were provided with a brief overview of four major psychotropic medication categories. Expected effects, potential side effects, adverse reactions, and contraindications were discussed. Patients learned about how their medications work to treat their illness and reviewed safe medication management, handling, and disposal of medications.     Patient Session Goals / Objectives:  Listed the four major categories of psychotropic medications (antidepressants, antipsychotics, mood stabilizers, anti-anxiety)  Identified medications in each category and important adverse reactions/contraindications for use  Explained how the medications they take work to treat their illness       Patient Participation / Response:  Fully participated with the group by sharing personal reflections / insights and openly received / provided feedback with other participants.     Demonstrated understanding of topics discussed through group discussion and participation    Treatment Plan:  Patient has a current master individualized treatment plan.  See Epic treatment plan for more information.    ELLIE June

## 2023-12-05 NOTE — GROUP NOTE
Process Group Note    PATIENT'S NAME: Maria Alejandra Betts  MRN:   [id number]  :   2000  ACCT. NUMBER: [id number]  DATE OF SERVICE: 23  START TIME:  1:00 PM  END TIME:  1:50 PM  FACILITATOR: Alicia Brock LICSW  TOPIC:  Process Group    Diagnoses:  1. Major depressive disorder, recurrent episode, moderate (H)  F33.1         2. Generalized anxiety disorder  F41.1            By history, traumatic brain injury, borderline personality disorder  Have not ruled out 300.01 (F41.0) Panic Disorder    [facility]  TRACK: IOP/DT 5    NUMBER OF PARTICIPANTS: 6    Data:    Session content: At the start of this group, patients were invited to check in by identifying themselves, describing their current emotional status, and identifying issues to address in this group.   Area(s) of treatment focus addressed in this session included Develop / Improve Independent Living Skills and Develop Socialization / Interpersonal Relationship Skills.    Therapeutic Interventions/Treatment Strategies:  Psychotherapist offered support, feedback and validation and reinforced use of skills. Treatment modalities used include Cognitive Behavioral Therapy and Dialectical Behavioral Therapy. Interventions include Behavioral Activation: Explored how behaviors effect mood and interact with thoughts and feelings, Coping Skills: Discussed use of self-soothe skills to decrease distress in the body and Assisted patient in identifying 1-2 healthy distraction skills to reduce overall distress, and Relationship Skills: Encouraged development and maintenance  of healthy boundaries.    Patient reported feeling- Anxious, hopeless, unseen, overwhelmed  Patient stated goal is- emotion regulation  They identified a potential barrier as- negative self-talk  Skills they plan to practice to address the goal include- boundaries  Any safety concerns?  Passive SI reported, no intent or plan  Any  Substance Use concerns? No concerns  Taking medications as prescribed? yes  Something patient is proud/grateful for proud of making it here today    Assessment:    Patient response:   Patient responded to session by accepting feedback, giving feedback, listening, being attentive, accepting support, and appearing alert    Possible barriers to participation / learning include: and no barriers identified    Health Issues:   None reported       Substance Use Review:   Substance Use: No active concerns identified.    Mental Status/Behavioral Observations  Appearance:   Appropriate   Eye Contact:   Good   Psychomotor Behavior: Normal   Attitude:   Cooperative  Interested  Orientation:   All  Speech   Rate / Production: Normal    Volume:  Normal   Mood:    Anxious  Depressed   Affect:    Appropriate   Thought Content:   Safety reports  presence of suicidal ideation passive suicidal ideation   Thought Form:  Coherent  Logical     Insight:    Good     Plan:   Safety Plan: Recommended that patient call 911 or go to the local ED should there be a change in any of these risk factors.  Committed to safety and agreed to follow previously developed safety coping plan.    Barriers to treatment: None identified  Patient Contracts (see media tab):  None  Substance Use: Not addressed in session   Continue or Discharge: Patient will continue in Adult Day Treatment (ADT)  as planned. Patient is likely to benefit from learning and using skills as they work toward the goals identified in their treatment plan.    Alicia Brock, [facility]  December 5, 2023

## 2023-12-05 NOTE — GROUP NOTE
Psychoeducation Group Note    PATIENT'S NAME: Maria Alejandra Betts  MRN:   [id number]  :   2000  ACCT. NUMBER: [id number]  DATE OF SERVICE: 23  START TIME:  2:00 PM  END TIME:  2:50 PM  FACILITATOR: Alicia Brock LICSW; Massiel Nathan OT  TOPIC:  Life Skills Group: Life Skills  [facility]  TRACK: IOP/DT 5    NUMBER OF PARTICIPANTS: 6    Summary of Group / Topics Discussed:  Life Skills Clinic: Provided opportunity for patients to independently choose and engage in a therapeutic activity that supports progress towards their goals and psychiatric recovery. Discussed the skill of behavioral activation and acting opposite to emotion to improve motivation in everyday life tasks. The Life Skills Clinic provides a context for patients to monitor their symptoms, gain self-awareness, practice skills (self-regulation, mindfulness, self-talk, focus/concentration, social, productivity), build a sense of self efficacy and mastery, as well as receive validation, support, and resources. Staff checks in, observes, assesses, and monitors performance skills and patterns in context with each group member.        Patient Session Goals / Objectives:    Independently identify a purposeful and meaningful therapeutic activity.    Identified which goal(s) they are intentionally working on during session.     Reflected on their performance and share insight about their progress.    Practiced and identified a way to generalize a skill to their everyday life.       Patient Participation / Response:  Fully participated with the group by sharing personal reflections / insights and openly received / provided feedback with other participants.    Verbalized understanding of content    Treatment Plan:  Patient has a current master individualized treatment plan.  See Epic treatment plan for more information.    ELLIE June

## 2023-12-06 ENCOUNTER — HOSPITAL ENCOUNTER (OUTPATIENT)
Dept: BEHAVIORAL HEALTH | Facility: CLINIC | Age: 23
Discharge: HOME | End: 2023-12-06
Attending: PSYCHIATRY & NEUROLOGY
Payer: COMMERCIAL

## 2023-12-06 PROCEDURE — 90853 GROUP PSYCHOTHERAPY: CPT | Performed by: SOCIAL WORKER

## 2023-12-06 NOTE — GROUP NOTE
"Process Group Note    PATIENT'S NAME: Maria Alejandra Betts  MRN:   [id number]  :   2000  ACCT. NUMBER: [id number]  DATE OF SERVICE: 23  START TIME:  1:00 PM  END TIME:  1:50 PM  FACILITATOR: Alicia Brock LICSW  TOPIC:  Process Group    Diagnoses:  1. Major depressive disorder, recurrent episode, moderate (H)  F33.1         2. Generalized anxiety disorder  F41.1            By history, traumatic brain injury, borderline personality disorder  Have not ruled out 300.01 (F41.0) Panic Disorder    [facility]  TRACK: IOP/DT 5    NUMBER OF PARTICIPANTS: 6    Data:    Session content: At the start of this group, patients were invited to check in by identifying themselves, describing their current emotional status, and identifying issues to address in this group.   Area(s) of treatment focus addressed in this session included Develop / Improve Independent Living Skills and Develop Socialization / Interpersonal Relationship Skills.    Therapeutic Interventions/Treatment Strategies:  Psychotherapist offered support, feedback and validation and reinforced use of skills. Treatment modalities used include Cognitive Behavioral Therapy and Dialectical Behavioral Therapy. Interventions include Cognitive Restructuring:  Explored impact of ineffective thoughts / distortions on mood and activity and Facilitated recognition of the connection between negative thoughts and negative core beliefs and Relationship Skills: Assisted patients in implementing more effective communication skills in their relationships and Discussed strategies to promote healthier understanding of interpersonal relationships.    Patient reported feeling- \"rejuvenated\" and slept well last night. Feeling more hopeful. Is applying for jobs and acknowledging \"I have a lot of experience\" and is getting call backs very quickly for interviews.   Patient stated goal is- emotion regulation  They " identified a potential barrier as- my supports, myself  Skills they plan to practice to address the goal include- set boundaries  Any safety concerns?  none  Any Substance Use concerns? none  Taking medications as prescribed? yes  Something patient is proud/grateful for the laughter shared by the group yesterday during topic group and the fun they had during it. Declined processing.    Patient response:   Patient responded to session by accepting feedback, giving feedback, listening, being attentive, accepting support, and appearing alert    Possible barriers to participation / learning include: and no barriers identified    Health Issues:   None reported       Substance Use Review:   Substance Use: No active concerns identified.    Mental Status/Behavioral Observations  Appearance:   Appropriate   Eye Contact:   Good   Psychomotor Behavior: Normal   Attitude:   Cooperative  Interested  Orientation:   All  Speech   Rate / Production: Normal    Volume:  Normal   Mood:    Normal   Affect:    Appropriate   Thought Content:   Clear   Thought Form:  Coherent  Logical     Insight:    Good     Plan:   Safety Plan: No current safety concerns identified.  Recommended that patient call 911 or go to the local ED should there be a change in any of these risk factors.   Barriers to treatment: None identified  Patient Contracts (see media tab):  None  Substance Use: Not addressed in session   Continue or Discharge: Patient will continue in Adult Day Treatment (ADT)  as planned. Patient is likely to benefit from learning and using skills as they work toward the goals identified in their treatment plan.    Alicia Brock, [facility]  December 6, 2023

## 2023-12-06 NOTE — GROUP NOTE
Psychotherapy Group Note    PATIENT'S NAME: Maria Alejandra Betts  MRN:   [id number]  :   2000  ACCT. NUMBER: [id number]  DATE OF SERVICE: 23  START TIME:  3:00 PM  END TIME:  3:50 PM  FACILITATOR: Bruna Saleem LICSW  TOPIC:  EBP Group: Self-Awareness  [facility] Outpatient [facility]  TRACK: Intensive Outpatient/Adult Day Treatment, track 5 GM     NUMBER OF PARTICIPANTS: 6    Summary of Group / Topics Discussed:  Self-Awareness: Grief: Patients were provided with an overview of how personal losses impact their thoughts, feelings, and behaviors. Different stages of grief were discussed, with a focus on the personal and individual experiences of grief as a natural response to loss. The relationship between grief, depression, and anxiety was also discussed. Patients were provided with information regarding different ways of processing grief and shared their personal experiences.     Patient Session Goals / Objectives:  Defined and explored the concept of grief and the grieving process  Discussed relationship between grief, depression, and anxiety   Normalized and recognized the purpose/benefits of the grieving process  Discussed management of the thoughts and feelings associated with grief      Patient Participation / Response:  Fully participated with the group by sharing personal reflections / insights and openly received / provided feedback with other participants.    Demonstrated understanding of topics discussed through group discussion and participation and Practiced skills in session    Treatment Plan:  Patient has a current master individualized treatment plan.  See Epic treatment plan for more information.    ELLIE Burgess

## 2023-12-06 NOTE — GROUP NOTE
Psychoeducation Group Note    PATIENT'S NAME: Maria Alejandra Betts  MRN:   [id number]  :   2000  ACCT. NUMBER: [id number]  DATE OF SERVICE: 23  START TIME:  2:00 PM  END TIME:  2:50 PM  FACILITATOR: Alicia Brock LICSW; Massiel Nathan OT  TOPIC: MH Life Skills Group: Sensory Approaches in Mental Health  [facility] Outpatient Programs  TRACK: IOP/DT 5    NUMBER OF PARTICIPANTS: 6    Summary of Group / Topics Discussed:     Sensory Approaches in Mental Health:  Self Regulation Through Sensory Modulation:  Patients were provided with education on alerting/calming sensory input and taught skills that can be used immediately to help them calm down and regain self control.  Patients were taught how to recognize specific signs and symptoms of their individualized state of arousal and how to make changes when needed.  Patients explored self regulation in their daily life and discussed techniques to help manage symptoms and change level of arousal when needed to be in control and comfortable so they are able to function in different environments.  Validation, support, and feedback were provided throughout group process.       Patient Session Goals / Objectives:    Identified specific and individualized self regulation skills to be used in their daily life to improve daily function    Identified signs and symptoms of current level of arousal and ways to make changes in level of arousal when needed    Established a plan for practice of these skills in their own environments      Patient Participation / Response:  Fully participated with the group by sharing personal reflections / insights and openly received / provided feedback with other participants.    Verbalized understanding of content    Treatment Plan:  Patient has a current master individualized treatment plan.  See Epic treatment plan for more information.    ELLIE June

## 2023-12-07 ENCOUNTER — TELEPHONE (OUTPATIENT)
Dept: BEHAVIORAL HEALTH | Facility: CLINIC | Age: 23
End: 2023-12-07
Payer: COMMERCIAL

## 2023-12-07 ENCOUNTER — HOSPITAL ENCOUNTER (OUTPATIENT)
Dept: BEHAVIORAL HEALTH | Facility: CLINIC | Age: 23
Discharge: HOME | End: 2023-12-07
Attending: PSYCHIATRY & NEUROLOGY
Payer: COMMERCIAL

## 2023-12-07 PROCEDURE — 90853 GROUP PSYCHOTHERAPY: CPT | Performed by: SOCIAL WORKER

## 2023-12-07 PROCEDURE — 90853 GROUP PSYCHOTHERAPY: CPT | Performed by: PSYCHOLOGIST

## 2023-12-07 NOTE — TELEPHONE ENCOUNTER
Writer contacted patient to inform them that they will need to transfer to an IOP track starting 12/11, and can have today's group session to say goodbye to current group members. Provided call back information, [phone].    Isabel Griffith, [facility] on 12/7/2023 at 11:08 AM

## 2023-12-07 NOTE — TELEPHONE ENCOUNTER
----- Message from Robles Evans sent at 12/5/2023  2:39 PM CST -----  Regarding: track change  Scheduling Request    Patient Name: No patient name on file.  Location of programming: Adult Day Tx  Start Date: December / 11 / 2023  Group: DT 2 GM [159026] on Monday, Tuesday, Wednesday, and Thursday at 1:00 PM to 4:00 PM  Attending Provider (MD): Igor Persaud  Number of visits to be scheduled:   Duration of Appointment in minutes: 180 mins  Visit Type: In-person or Treatment - 870    Additional notes: Pt will be changing from track IOP/DT 5 to track DT 2 GM [709062] starting 12/11/23.  Please make appt changes.  Tentative discharge date is 1/31/2024.

## 2023-12-07 NOTE — GROUP NOTE
Psychotherapy Group Note    PATIENT'S NAME: Maria Alejandra Betts  MRN:   [id number]  :   2000  ACCT. NUMBER: [id number]  DATE OF SERVICE: 23  START TIME:  3:00 PM  END TIME:  3:50 PM  FACILITATOR: Farheen Edwards Psy.D, LP  TOPIC:  EBP Group: Behavioral Activation  [facility] Outpatient Programs  TRACK: iop/dt5   B2    NUMBER OF PARTICIPANTS: 6    Summary of Group / Topics Discussed:  Behavioral Activation: The Change Process - Behavior Change: Patients explored the process and types of change, including but not limited to, theories of change, steps to making change, methods of changing behavior, and potential barriers.  Patients worked to identify what changes may benefit their daily lives, and work towards a plan to implement change.      Patient Session Goals / Objectives:  Demonstrate understanding of the change process.    Identify positive and negative behavioral patterns.  Make plans to track and implement changes and share experiences in group.  Identify personal barriers to change      Patient Participation / Response:  Fully participated with the group by sharing personal reflections / insights and openly received / provided feedback with other participants.    Expressed understanding of the relationship between behaviors, thoughts, and feelings    Treatment Plan:  Patient has a current master individualized treatment plan.  See Epic treatment plan for more information.    Farheen Edwards Psy.D, LP

## 2023-12-07 NOTE — GROUP NOTE
Process Group Note    PATIENT'S NAME: Maria Alejandra Betts  MRN:   [id number]  :   2000  ACCT. NUMBER: [id number]  DATE OF SERVICE: 23  START TIME:  1:00 PM  END TIME:  1:50 PM  FACILITATOR: Alicia Brock LICSW  TOPIC:  Process Group    Diagnoses:  1. Major depressive disorder, recurrent episode, moderate (H)  F33.1         2. Generalized anxiety disorder  F41.1            By history, traumatic brain injury, borderline personality disorder  Have not ruled out 300.01 (F41.0) Panic Disorder    [facility] Outpatient Programs  TRACK: IOP/DT 5    NUMBER OF PARTICIPANTS: 6    Data:    Session content: At the start of this group, patients were invited to check in by identifying themselves, describing their current emotional status, and identifying issues to address in this group.   Area(s) of treatment focus addressed in this session included Develop / Improve Independent Living Skills and Develop Socialization / Interpersonal Relationship Skills.    Therapeutic Interventions/Treatment Strategies:  Psychotherapist offered support, feedback and validation and reinforced use of skills. Treatment modalities used include Cognitive Behavioral Therapy and Dialectical Behavioral Therapy. Interventions include Behavioral Activation: Encouraged strategies to reduce individual procrastination and increase motivation by increasing goal-directed activities to enhance mood and reduce symptoms., Coping Skills: Facilitated discussion on learning and applying radical acceptance skill and Discussed use of self-soothe skills to decrease distress in the body, and Relationship Skills: Assisted patients in implementing more effective communication skills in their relationships and Encouraged development and maintenance  of healthy boundaries.    Patient response:   Patient responded to session by accepting feedback, giving feedback, listening, being attentive, accepting support, and appearing  alert    Possible barriers to participation / learning include: and no barriers identified    Health Issues:   None reported       Substance Use Review:   Substance Use: No active concerns identified.    Mental Status/Behavioral Observations  Appearance:   Appropriate   Eye Contact:   Good   Psychomotor Behavior: Normal   Attitude:   Cooperative  Interested  Orientation:   All  Speech   Rate / Production: Normal    Volume:  Normal   Mood:    Anxious   Affect:    Appropriate   Thought Content:   Clear  Thought Form:  Coherent  Logical     Insight:    Good     Plan:   Safety Plan: No current safety concerns identified.  Recommended that patient call 911 or go to the local ED should there be a change in any of these risk factors.   Barriers to treatment: None identified  Patient Contracts (see media tab):  None  Substance Use: Not addressed in session   Continue or Discharge: Patient will continue in Adult Day Treatment (ADT)  as planned. Patient is likely to benefit from learning and using skills as they work toward the goals identified in their treatment plan.      Alicia Brock, [facility]  December 7, 2023

## 2023-12-08 ENCOUNTER — TELEPHONE (OUTPATIENT)
Dept: BEHAVIORAL HEALTH | Facility: CLINIC | Age: 23
End: 2023-12-08
Payer: COMMERCIAL

## 2023-12-08 NOTE — GROUP NOTE
"Process Group Note    PATIENT'S NAME: Maria Alejandra Betts  MRN:   [id number]  :   2000  ACCT. NUMBER: [id number]  DATE OF SERVICE: 23  START TIME:  1:00 PM  END TIME:  1:50 PM  FACILITATOR: Alicia Brock LICSW  TOPIC:  Process Group    Diagnoses:  1. Major depressive disorder, recurrent episode, moderate (H)  F33.1         2. Generalized anxiety disorder  F41.1            By history, traumatic brain injury, borderline personality disorder  Have not ruled out 300.01 (F41.0) Panic Disorder    [facility] Outpatient Programs  TRACK: IOP/DT 5    NUMBER OF PARTICIPANTS: 6    Data:    Session content: At the start of this group, patients were invited to check in by identifying themselves, describing their current emotional status, and identifying issues to address in this group.   Area(s) of treatment focus addressed in this session included Develop / Improve Independent Living Skills and Develop Socialization / Interpersonal Relationship Skills.    Therapeutic Interventions/Treatment Strategies:  Psychotherapist offered support, feedback and validation and reinforced use of skills. Treatment modalities used include Cognitive Behavioral Therapy and Dialectical Behavioral Therapy. Interventions include Behavioral Activation: Reinforced benefits/challenges of change process through applying skills to replace unwanted behaviors and Encouraged strategies to reduce individual procrastination and increase motivation by increasing goal-directed activities to enhance mood and reduce symptoms., Coping Skills: Discussed use of self-soothe skills to decrease distress in the body, Discussed how the use of intentional \"in the moment\" actions can help reduce distress, and Promoted understanding of how and when to apply grounding strategies to reduce distress and increase presence in the moment, and Emotions Management:  Reinforced the purpose and biological basis of emotions and " "Reviewed opposite action skill.    Content and frustrated. Today is my last day, just found out. Goal: emotion regulation and boundaries. Skills: not letting what other people have to say change my opinion. Barriers: negative self-talk, negative talk from family. Denied safety concerns or SA. Reports taking Rx as prescribed. Proud of making it here today. Part didn't want to attend due to finding out abruptly today is their last day. Noted also proud they are taking their medications consistently. No processing time.     Patient response:   Patient responded to session by accepting feedback, giving feedback, listening, being attentive, accepting support, and appearing alert    Possible barriers to participation / learning include: and no barriers identified    Health Issues:   None reported       Substance Use Review:   Substance Use: No active concerns identified.    Mental Status/Behavioral Observations  Appearance:   Appropriate   Eye Contact:   Good   Psychomotor Behavior: Normal   Attitude:   Cooperative Pleasant  Orientation:   All  Speech   Rate / Production: Normal    Volume:  Normal   Mood:    Normal \"frustrated\"   Affect:    Appropriate   Thought Content:   Clear  Thought Form:  Coherent  Logical     Insight:    Good     Plan:   Safety Plan: No current safety concerns identified.  Recommended that patient call 911 or go to the local ED should there be a change in any of these risk factors.   Barriers to treatment: None identified  Patient Contracts (see media tab):  None  Substance Use: Not addressed in session   Continue or Discharge: Patient will continue in Adult Day Treatment (ADT)  as planned. Patient is likely to benefit from learning and using skills as they work toward the goals identified in their treatment plan. and pt transitioning to new track due to insurance/programmatic changes.     Alicia Brock, [facility]  December 8, 2023  "

## 2023-12-08 NOTE — GROUP NOTE
Psychoeducation Group Note    PATIENT'S NAME: Maria Alejandra Betts  MRN:   [id number]  :   2000  ACCT. NUMBER: [id number]  DATE OF SERVICE: 23  START TIME:  2:00 PM  END TIME:  2:50 PM  FACILITATOR: Alicia Brock LICSW; Amena Negrete RN  TOPIC:  Wellness Group: Mind/Body Practice & Complementary  [facility]  TRACK: IOP/DT 5    NUMBER OF PARTICIPANTS: 6    Summary of Group / Topics Discussed:  Mind/Body Practice & Complementary Therapies:  Relaxation Techniques: In this group, patients were educated on a variety of relaxation and mindfulness skills to reduce distress and improve coping skills. The skills were taught to the group and then practiced through an organized exercise.     Skills taught & practiced included:  Personal Relaxation Scene, Deep Breathing Exercise, Sensory Exercise, Mindful Eating,  Mindful Walking Exercise, Guided Imagery, Cognitive Defusion     Patient Session Goals / Objectives:  Identified relaxation skills  Explained how the various relaxation skills are practiced  Practiced relaxation experiential     Patient Participation / Response:  Fully participated with the group by sharing personal reflections / insights and openly received / provided feedback with other participants.    Demonstrated understanding of topics discussed through group discussion and participation    Treatment Plan:  Patient has a current master individualized treatment plan.  See Epic treatment plan for more information.    ELLIE June

## 2023-12-08 NOTE — TELEPHONE ENCOUNTER
----- Message from Isabel Griffith [facility] sent at 12/8/2023 11:31 AM CST -----  Regarding: Patient switching from IOP/DT 5 to IOP 3  Please cancel all future IOP/DT 5 or DT 2 appointments, patient's insurance changed 12/1 and requires straight IOP track.    Adult Mental Health Programmatic Care Schedule Request    Patient Name: Maria Alejandra Betts  Location of programming: [facility]  Start Date: 12/11/23      Adult Program Group: Adult Program Group: IOP 3 General Mood Track [[id number]]  Schedule: M, T, W, TH 1pm-4pm  12 hours per week for 9 weeks  Number of visits to be scheduled: 36 days    Attending Provider (MD):  Dr Igor Persaud.  Visit Type:  In-Person    Accommodations Needed: N/A  Alerts Identified/Substantiation: N/A  Consulted with Supervisor: N/A    Send to:   [UR BEH BCA ([id number])] ##ONLY if Start Date is determined##  NG 14 OBC's (BEH BEHAVIORAL OUTPATIENT ASSESSMENTS [[id number]])   Isabel Griffith ([facility])  Luci Candelario (PHP)

## 2023-12-10 ASSESSMENT — ANXIETY QUESTIONNAIRES
4. TROUBLE RELAXING: MORE THAN HALF THE DAYS
7. FEELING AFRAID AS IF SOMETHING AWFUL MIGHT HAPPEN: SEVERAL DAYS
2. NOT BEING ABLE TO STOP OR CONTROL WORRYING: NEARLY EVERY DAY
IF YOU CHECKED OFF ANY PROBLEMS ON THIS QUESTIONNAIRE, HOW DIFFICULT HAVE THESE PROBLEMS MADE IT FOR YOU TO DO YOUR WORK, TAKE CARE OF THINGS AT HOME, OR GET ALONG WITH OTHER PEOPLE: SOMEWHAT DIFFICULT
GAD7 TOTAL SCORE: 15
6. BECOMING EASILY ANNOYED OR IRRITABLE: SEVERAL DAYS
3. WORRYING TOO MUCH ABOUT DIFFERENT THINGS: NEARLY EVERY DAY
5. BEING SO RESTLESS THAT IT IS HARD TO SIT STILL: MORE THAN HALF THE DAYS
1. FEELING NERVOUS, ANXIOUS, OR ON EDGE: NEARLY EVERY DAY
GAD7 TOTAL SCORE: 15

## 2023-12-10 ASSESSMENT — PATIENT HEALTH QUESTIONNAIRE - PHQ9
SUM OF ALL RESPONSES TO PHQ QUESTIONS 1-9: 16
SUM OF ALL RESPONSES TO PHQ QUESTIONS 1-9: 16
10. IF YOU CHECKED OFF ANY PROBLEMS, HOW DIFFICULT HAVE THESE PROBLEMS MADE IT FOR YOU TO DO YOUR WORK, TAKE CARE OF THINGS AT HOME, OR GET ALONG WITH OTHER PEOPLE: SOMEWHAT DIFFICULT

## 2023-12-10 NOTE — COMMUNITY RESOURCES LIST (ENGLISH)
12/10/2023   [facility] [employer]  N/A  For questions about this resource list or additional care needs, please contact your primary care clinic or care manager.  Phone: [phone]   Email: N/A   Address: [address] [place] [address]   Hours: N/A        Financial Stability       Rent and mortgage payment assistance  1  [facility] Distance: 1.88 miles      In-Person   [address] [place] [address]  Language: English  Hours: Mon - Fri 8:00 AM - 3:00 PM  Fees: Free   Phone: (815) 202-3039 Ext.14 Email: neighborhood@[facility] CampusNetcordiaSelect [facility] OhioChatterPlug Website: http://www.[employer]     2  [employer] [facility] [employer] (NA) - [place] [employer] [facility] Distance: 2.11 miles      Phone/Virtual   [address] [place] [address]  Language: English, Yakut  Hours: Mon - Fri 10:00 AM - 4:00 PM  Fees: Free   Phone: (726) 344-9202 Email: info@[employer].org Website: http://[employer]na.org          Important Numbers & Websites       Emergency Services   911  City Services   311  Poison Control   (354) 998-6488  Suicide Prevention Lifeline   (498) 595-8716 (TALK)  Child Abuse Hotline   (830) 332-8749 (4-A-Child)  Sexual Assault Hotline   (630) 203-4566 (HOPE)  National Runaway Safeline   (368) 287-4133 (RUNAWAY)  All-Options Talkline   (412) 647-1989  Substance Abuse Referral   (684) 433-8263 (HELP)

## 2023-12-11 ENCOUNTER — OFFICE VISIT (OUTPATIENT)
Dept: FAMILY MEDICINE | Facility: CLINIC | Age: 23
End: 2023-12-11
Payer: COMMERCIAL

## 2023-12-11 ENCOUNTER — ANCILLARY PROCEDURE (OUTPATIENT)
Dept: GENERAL RADIOLOGY | Facility: CLINIC | Age: 23
End: 2023-12-11
Attending: FAMILY MEDICINE
Payer: COMMERCIAL

## 2023-12-11 VITALS
HEIGHT: 69 IN | BODY MASS INDEX: 21.11 KG/M2 | WEIGHT: 142.5 LBS | SYSTOLIC BLOOD PRESSURE: 116 MMHG | DIASTOLIC BLOOD PRESSURE: 80 MMHG | OXYGEN SATURATION: 99 % | TEMPERATURE: 98.5 F | HEART RATE: 62 BPM

## 2023-12-11 DIAGNOSIS — N93.8 DUB (DYSFUNCTIONAL UTERINE BLEEDING): Primary | ICD-10-CM

## 2023-12-11 DIAGNOSIS — R11.0 NAUSEA: ICD-10-CM

## 2023-12-11 DIAGNOSIS — F33.1 MDD (MAJOR DEPRESSIVE DISORDER), RECURRENT EPISODE, MODERATE (H): ICD-10-CM

## 2023-12-11 DIAGNOSIS — R19.7 DIARRHEA OF PRESUMED INFECTIOUS ORIGIN: ICD-10-CM

## 2023-12-11 DIAGNOSIS — G43.109 MIGRAINE WITH AURA AND WITHOUT STATUS MIGRAINOSUS, NOT INTRACTABLE: ICD-10-CM

## 2023-12-11 LAB
ALBUMIN SERPL BCG-MCNC: 4.4 G/DL (ref 3.5–5.2)
ALP SERPL-CCNC: 77 U/L (ref 40–150)
ALT SERPL W P-5'-P-CCNC: 29 U/L (ref 0–50)
AST SERPL W P-5'-P-CCNC: 25 U/L (ref 0–45)
BASOPHILS # BLD AUTO: 0 10E3/UL (ref 0–0.2)
BASOPHILS NFR BLD AUTO: 0 %
BILIRUB DIRECT SERPL-MCNC: <0.2 MG/DL (ref 0–0.3)
BILIRUB SERPL-MCNC: 0.2 MG/DL
EOSINOPHIL # BLD AUTO: 0 10E3/UL (ref 0–0.7)
EOSINOPHIL NFR BLD AUTO: 0 %
ERYTHROCYTE [DISTWIDTH] IN BLOOD BY AUTOMATED COUNT: 12.8 % (ref 10–15)
HCT VFR BLD AUTO: 41.5 % (ref 35–47)
HGB BLD-MCNC: 12.8 G/DL (ref 11.7–15.7)
IMM GRANULOCYTES # BLD: 0 10E3/UL
IMM GRANULOCYTES NFR BLD: 0 %
LIPASE SERPL-CCNC: 53 U/L (ref 13–60)
LYMPHOCYTES # BLD AUTO: 1.6 10E3/UL (ref 0.8–5.3)
LYMPHOCYTES NFR BLD AUTO: 29 %
MCH RBC QN AUTO: 27.6 PG (ref 26.5–33)
MCHC RBC AUTO-ENTMCNC: 30.8 G/DL (ref 31.5–36.5)
MCV RBC AUTO: 90 FL (ref 78–100)
MONOCYTES # BLD AUTO: 0.4 10E3/UL (ref 0–1.3)
MONOCYTES NFR BLD AUTO: 7 %
NEUTROPHILS # BLD AUTO: 3.5 10E3/UL (ref 1.6–8.3)
NEUTROPHILS NFR BLD AUTO: 64 %
PLATELET # BLD AUTO: 310 10E3/UL (ref 150–450)
PROT SERPL-MCNC: 6.8 G/DL (ref 6.4–8.3)
RBC # BLD AUTO: 4.63 10E6/UL (ref 3.8–5.2)
WBC # BLD AUTO: 5.5 10E3/UL (ref 4–11)

## 2023-12-11 PROCEDURE — 90471 IMMUNIZATION ADMIN: CPT | Performed by: FAMILY MEDICINE

## 2023-12-11 PROCEDURE — 90686 IIV4 VACC NO PRSV 0.5 ML IM: CPT | Performed by: FAMILY MEDICINE

## 2023-12-11 PROCEDURE — 91320 SARSCV2 VAC 30MCG TRS-SUC IM: CPT | Performed by: FAMILY MEDICINE

## 2023-12-11 PROCEDURE — 83690 ASSAY OF LIPASE: CPT | Performed by: FAMILY MEDICINE

## 2023-12-11 PROCEDURE — 85025 COMPLETE CBC W/AUTO DIFF WBC: CPT | Performed by: FAMILY MEDICINE

## 2023-12-11 PROCEDURE — 80076 HEPATIC FUNCTION PANEL: CPT | Performed by: FAMILY MEDICINE

## 2023-12-11 PROCEDURE — 74019 RADEX ABDOMEN 2 VIEWS: CPT | Mod: TC | Performed by: RADIOLOGY

## 2023-12-11 PROCEDURE — 99214 OFFICE O/P EST MOD 30 MIN: CPT | Mod: 25 | Performed by: FAMILY MEDICINE

## 2023-12-11 PROCEDURE — 36415 COLL VENOUS BLD VENIPUNCTURE: CPT | Performed by: FAMILY MEDICINE

## 2023-12-11 PROCEDURE — 90480 ADMN SARSCOV2 VAC 1/ONLY CMP: CPT | Performed by: FAMILY MEDICINE

## 2023-12-11 ASSESSMENT — PAIN SCALES - GENERAL: PAINLEVEL: NO PAIN (0)

## 2023-12-11 NOTE — COMMUNITY RESOURCES LIST (ENGLISH)
12/11/2023   [facility] Outpatient Clinics  N/A  For additional resource needs, please contact your health insurance member services or your primary care team.  Phone: [phone]   Email: N/A   Address: [address] [place] [address]   Hours: N/A        Financial Stability       Rent and mortgage payment assistance  1  [facility] Distance: 1.88 miles      In-Person   [address] [place] [address]  Language: English  Hours: Mon - Fri 8:00 AM - 3:00 PM  Fees: Free   Phone: (574) 708-7374 Ext.14 Email: neighborhood@[facility] FranciscoNxThera Website: http://www.[facility] HospitalClarion Research Group.[employer]     2  [employer] [facility] [employer] (PPNA) - [place] [facility]ra[employer] PharmaceuticalsPark[facility] Health Centerition's [employer] Support Fund Distance: 2.11 miles      Phone/Virtual   [address], [place] [address]  Language: English, Czech  Hours: Mon - Fri 10:00 AM - 4:00 PM  Fees: Free   Phone: (739) 727-5804 Email: info@ppna.org Website: http://ppna.org          Important Numbers & Websites       [facility]   211 211itedway.org  Poison Control   (276) 904-8806 [place]  Suicide and Crisis Lifeline   [address] [place], [place] [address]line.org  Childhelp National Child Abuse Hotline   508.224.2914 Childhelphotline.org  [place] Sexual Assault Hotline   (497) 626-2726 (HOPE) [facility] Cent[facility].org  National Runaway Safeline   (315) 452-9012 (RUNAWAY) [facility] HospitalrunarSmart.org  Pregnancy & Postpartum Support [place]   Call/text 353-598-4860 Ppsupportmn.org  Substance Abuse National Helpline ([facility]   274-921-HELP (3662) Findtreatment.gov  Emergency Services   911

## 2023-12-11 NOTE — PROGRESS NOTES
Assessment & Plan     DUB (dysfunctional uterine bleeding)  Advised pelvic US and then discussion with options for this - IUD or nexplanon  - disussed that if it starts to fail after a few years can be replaced sooner  - US Pelvic Complete with Transvaginal; Future    Nausea    - XR Abdomen 2 Views; Future  - Lipase; Future  - Hepatic panel (Albumin, ALT, AST, Bili, Alk Phos, TP); Future  - Lipase  - Hepatic panel (Albumin, ALT, AST, Bili, Alk Phos, TP)    Diarrhea of presumed infectious origin  Reviewed her last labs  Advised elimination diet    - CBC with platelets and differential  - C. difficile Toxin B PCR with reflex to C. difficile Antigen and Toxins A/B EIA  - Helicobacter pylori Antigen Stool    Migraine with aura and without status migrainosus, not intractable  Might be linked to food sensitivity and periods  Tryial of this med and with IUD insertion might get better  - galcanezumab-gnlm (EMGALITY) 120 MG/ML injection; Inject 1 mL (120 mg) Subcutaneous every 28 days    MDD (major depressive disorder), recurrent episode, moderate (H)  Feels pain and above  problems really affect mood  Recheck this in 4-6 weeks after getting IUD    Prescription drug management         Depression Screening Follow Up        12/10/2023     9:53 AM   PHQ   PHQ-9 Total Score 16   Q9: Thoughts of better off dead/self-harm past 2 weeks Several days   F/U: Thoughts of suicide or self-harm Yes   F/U: Self harm-plan No   F/U: Self-harm action No   F/U: Safety concerns No     Has no intention of self harm and good support network            Follow Up      Follow Up Actions Taken  Crisis resource information provided in the After Visit Summary    Discussed the following ways the patient can remain in a safe environment:  be around others  See Patient Instructions    Kenzie Walker MD  [facility] [place]    Kiesha Bess is a 23 year old, presenting for the following health issues:  Abnormal Bleeding  Problem        12/11/2023     8:46 AM   Additional Questions   Roomed by Mackenzie       Abnormal Bleeding Problem    History of Present Illness       Reason for visit:  I am coming in for a couple reasons. I have had some stomach issues that were pretty bad a month or two ago that has now somewhat resolved but I've lost alot of weight and dont have an appetite to eat. I also have had a twitch in my right arm that's new.  Symptom onset:  More than a month  Symptoms include:  Poor appetite, trouble sleeping or staying asleep, weight loss, sore stomach, migraines, some loose bowels  Symptom intensity:  Moderate  Symptom progression:  Staying the same  Had these symptoms before:  No  What makes it worse:  Eating makes me nauseous and anxiety  What makes it better:  Sleep, eating if i have an appetite,    She eats 0-1 servings of fruits and vegetables daily.She consumes 0 sweetened beverage(s) daily.She exercises with enough effort to increase her heart rate 9 or less minutes per day.  She exercises with enough effort to increase her heart rate 3 or less days per week.   She is taking medications regularly.   1) pain and cramping noted with periods.  Has always been this way. Last about a week.  Heavy as well  has had an iud in the past - worked for 3 years then periods resumed    2)  stomach:  Has reduced appetite, feels nausea trying to force foods  Wt Readings from Last 4 Encounters:   12/11/23 64.6 kg (142 lb 8 oz)   11/13/23 64.9 kg (143 lb)   09/08/23 65.4 kg (144 lb 3.2 oz)   05/19/23 75.8 kg (167 lb 3.2 oz)     Present since this August  No burning  Eating makes this better sometimes worse.  Sensitive to food  Does not use nsaids more than once per week.  Stools have some looseness soft overall usually once per day  Having a BM helps nausea some  Constipations is not an issue  Nausea is worse in morning better in evening  Has not tried meds for this  Not a lot of irritating foods           Concern -  Gastrointestinal concern   Onset: More than a month   Description: Has noticed that they have been having decrease in appetite, especially in the last couple weeks   States that they have lost 30 lbs in 2-3 month time frame   Mental health has affected managing physical health   Concern with sensitivity with certain foods, especially dairy and gluten   Intensity: mild  Progression of Symptoms:  constant  Accompanying Signs & Symptoms:   Menstrual Cycle   - Heavy cycle with worsening cramps   - Typical uses a tampons- uses 3-4 ultra tampons   - Cramps are 8/10 - can wake them up in the middle of the night   - Uses pain relievers and heat pads to manage pain   - History of BC (IUD - Mirena)  Migraine   - In the last 2 weeksm, has been getting them daily   In the past was prescribed Sumatriptan(Imitrex) 50 mg  - states that it helped with symptom   Has new glasses =- this helps    Wakes up with headaches  Usually worse as day goes on  Has migraines in their mother  Has had migraines since being young - might have gotten worse with periods  Can't recall having migraines with IUD    Previous history of similar problem: None   Precipitating factors:        Worsened by: None   Alleviating factors:        Improved by: None   Therapies tried and outcome: None        11/13/2023    10:46 AM 11/28/2023     1:00 PM 12/10/2023     9:53 AM   PHQ   PHQ-9 Total Score 21 20 16   Q9: Thoughts of better off dead/self-harm past 2 weeks Several days Not at all Several days   F/U: Thoughts of suicide or self-harm   Yes   F/U: Self harm-plan   No   F/U: Self-harm action   No   F/U: Safety concerns   No          11/13/2023    10:46 AM 11/28/2023     1:00 PM 12/10/2023     9:54 AM   BRUCE-7 SCORE   Total Score   15 (severe anxiety)   Total Score 11 10 15              Review of Systems   Genitourinary:  Positive for menstrual problem.      Constitutional, HEENT, cardiovascular, pulmonary, gi and gu systems are negative, except as otherwise  "noted.      Objective    /80   Pulse 62   Temp 98.5  F (36.9  C) (Temporal)   Ht 1.753 m (5' 9\")   Wt 64.6 kg (142 lb 8 oz)   LMP 11/30/2023 (Within Weeks)   SpO2 99%   Breastfeeding No   BMI 21.04 kg/m    There is no height or weight on file to calculate BMI.  Physical Exam   GENERAL: healthy, alert and no distress  HENT: ear canals and TM's normal, nose and mouth without ulcers or lesions  NECK: no adenopathy, no asymmetry, masses, or scars and thyroid normal to palpation  RESP: lungs clear to auscultation - no rales, rhonchi or wheezes  CV: regular rate and rhythm, normal S1 S2, no S3 or S4, no murmur, click or rub, no peripheral edema and peripheral pulses strong  NEURO: Normal strength and tone, mentation intact and speech normal  PSYCH: mentation appears normal and affect normal/bright    Lab on 10/10/2023   Component Date Value Ref Range Status    Campylobacter species 10/11/2023 Negative  Negative Final    Salmonella species 10/11/2023 Negative  Negative Final    Vibrio species 10/11/2023 Negative  Negative Final    Vibrio cholerae 10/11/2023 Negative  Negative Final    Yersinia enterocolitica 10/11/2023 Negative  Negative Final    Enteropathogenic E. coli (EPEC) 10/11/2023 Negative  Negative, NA Final    Shiga-like toxin-producing E. coli* 10/11/2023 Negative  Negative Final    Shigella/Enteroinvasive E. coli (E* 10/11/2023 Negative  Negative Final    Cryptosporidium species 10/11/2023 Negative  Negative Final    Giardia lamblia 10/11/2023 Negative  Negative Final    Norovirus Gl/Gll 10/11/2023 Negative  Negative Final    Rotavirus A 10/11/2023 Negative  Negative Final    Plesiomonas shigelloides 10/11/2023 Negative  Negative Final    Enteroaggregative E. coli (EAEC) 10/11/2023 Negative  Negative Final    Enterotoxigenic E. coli (ETEC) 10/11/2023 Negative  Negative Final    E. coli O157 10/11/2023 NA  Negative, NA Final    Cyclospora cayetanensis 10/11/2023 Negative  Negative Final    " Entamoeba histolytica 10/11/2023 Negative  Negative Final    Adenovirus F40/41 10/11/2023 Negative  Negative Final    Astrovirus 10/11/2023 Negative  Negative Final    Sapovirus 10/11/2023 Negative  Negative Final

## 2023-12-11 NOTE — COMMUNITY RESOURCES LIST (ENGLISH)
12/11/2023   [facility] - Outpatient Clinics  N/A  For additional resource needs, please contact your health insurance member services or your primary care team.  Phone: [phone]   Email: N/A   Address: [address] [place] [address]   Hours: N/A        Financial Stability       Rent and mortgage payment assistance  1  [facility] Distance: 1.88 miles      In-Person   [address] [place] [address]  Language: English  Hours: Mon - Fri 8:00 AM - 3:00 PM  Fees: Free   Phone: (613) [phone] Ext.14 Email: neighborhood@[facility] HospitalJigs[facility] Enterprises Website: http://www.[facility] Children's HospitalSMATOOS.[employer]age     2  [employer]en[employer]rinda [facility] Association (PPNA) - [place] [employer] [facility]'s [employer] Support Fund Distance: 2.11 miles      Phone/Virtual   [address], [place] [address]  Language: English, Wolof  Hours: Mon - Fri 10:00 AM - 4:00 PM  Fees: Free   Phone: (724) 131-6934 Email: info@ppna.org Website: http://ppna.org          Important Numbers & Websites       [facility]   211 211itedway.org  Poison Control   (863) 670-4839 [place]  Suicide and Crisis Lifeline   [address] [place], [place] [address]line.org  Childhelp National Child Abuse Hotline   318.110.1620 Childhelphotline.org  [place] Sexual Assault Hotline   (418) 876-9533 (HOPE) [facility].org  National Runaway Safeline   (725) 997-3012 (RUNAWAY) [facility].org  Pregnancy & Postpartum Support [place]   Call/text 216-826-3648 Ppsupportmn.org  Substance Abuse National Helpline ([facility]   516-476-HELP (3931) Findtreatment.gov  Emergency Services   911

## 2023-12-11 NOTE — NURSING NOTE
Per orders of Dr. Espino, injection of COVID, flu given by Amena Mendez RN. Prior to immunization administration, verified patients identity using patient s name and date of birth. Patient instructed to remain in clinic for 15 minutes afterwards, and to report any adverse reaction to me or clinic staff immediately.    Amena Mendez RN on 12/11/2023 at 9:45 AM.    Please see Immunization Activity for additional information.

## 2023-12-11 NOTE — PATIENT INSTRUCTIONS
"1) pelvic pain:  Pelvic ultrasound  Probably need to retry the IUD    2) abdominal pain/nausea:  Xray and labs today  If this is all normal then ultrasound of the abdomen and then CT abdomen  Avoid irritating foods - try the FODMAP diet or \"The Whole 30\" - elimination diet    3)  Let me know if mood is not improving OR if you feel unsafe     F Fermentable   O Oligosaccharides Fructans, galacto-oligosaccharides Wheat, barley, rye, onion, carmen, white part of spring onion, garlic, shallots, artichokes, beetroot, fennel, peas, chicory, pistachio, cashews, legumes, lentils, and chickpeas   D Disaccharides Lactose Milk, custard, ice cream, and yogurt   M Monosaccharides \"Free fructose\" (fructose in excess of glucose) Apples, pears, mangoes, cherries, watermelon, asparagus, sugar snap peas, honey, high-fructose corn syrup   A And   P Polyols Sorbitol, mannitol, maltitol, and xylitol Apples, pears, apricots, cherries, nectarines, peaches, plums, watermelon, mushrooms, cauliflower, artificially sweetened chewing gum and confectionery        "

## 2023-12-11 NOTE — RESULT ENCOUNTER NOTE
Hello,    Your results were normal. I bet the elimination diet would help.  Waiting on lab results.    Kenzie Walker MD

## 2023-12-13 ENCOUNTER — HOSPITAL ENCOUNTER (OUTPATIENT)
Dept: BEHAVIORAL HEALTH | Facility: CLINIC | Age: 23
Discharge: HOME | End: 2023-12-13
Attending: PSYCHIATRY & NEUROLOGY
Payer: COMMERCIAL

## 2023-12-13 PROCEDURE — 90853 GROUP PSYCHOTHERAPY: CPT | Performed by: SOCIAL WORKER

## 2023-12-13 PROCEDURE — 90853 GROUP PSYCHOTHERAPY: CPT

## 2023-12-13 NOTE — GROUP NOTE
Psychotherapy Group Note    PATIENT'S NAME: Maria Alejandra Betts  MRN:   [id number]  :   2000  ACCT. NUMBER: [id number]  DATE OF SERVICE: 23  START TIME:  3:00 PM  END TIME:  3:50 PM  FACILITATOR: Leann Stone LICSW  TOPIC: MH EBP Group: Relationship Skills  [facility] Mental Health Outpatient Programs  TRACK: IOP 3    NUMBER OF PARTICIPANTS: 5    Summary of Group / Topics Discussed:  Relationship Skills: Boundaries: Patients were provided with a general overview of interpersonal boundaries and how lack of boundaries relates to symptoms and functioning. The purpose is to help patients identify boundary issues and gain awareness and skills to work towards healthier interpersonal boundaries. Current awareness of healthy boundary characteristics and barriers to establishing healthy boundaries were discussed.    Patient Session Goals / Objectives:  Familiarized patients with the concept of interpersonal boundaries and their characteristics  Discussed and practiced strategies to promote healthier interpersonal boundaries  Identified boundary issues and identified plan to improve boundaries      Patient Participation / Response:  Fully participated with the group by sharing personal reflections / insights and openly received / provided feedback with other participants.    Demonstrated understanding of topics discussed through group discussion and participation, Identified / Expressed personal readiness to incorporate effective communication skills, and Verbalized understanding of communication skills, communication challenges, and communication strengths    Treatment Plan:  Patient has an initial individualized treatment plan that was created as part of their diagnostic assessment / admission process.  A master individualized treatment plan is in the process of being developed with the patient and multi-disciplinary care team.    ELLIE Wong

## 2023-12-14 ENCOUNTER — HOSPITAL ENCOUNTER (OUTPATIENT)
Dept: BEHAVIORAL HEALTH | Facility: CLINIC | Age: 23
Discharge: HOME | End: 2023-12-14
Attending: PSYCHIATRY & NEUROLOGY
Payer: COMMERCIAL

## 2023-12-14 PROCEDURE — 90853 GROUP PSYCHOTHERAPY: CPT

## 2023-12-14 PROCEDURE — 90853 GROUP PSYCHOTHERAPY: CPT | Performed by: SOCIAL WORKER

## 2023-12-14 NOTE — GROUP NOTE
Psychotherapy Group Note     PATIENT'S NAME: Maria Alejandra Betts  MRN:   [id number]  :   2000  ACCT. NUMBER: [id number]  DATE OF SERVICE: 23  START TIME:  2:00 PM  END TIME:  2:50 PM  FACILITATOR: Carolyn Lopez LICSW  TOPIC: MH EBP Group: Relationship Skills  [facility] Mental Health Outpatient Programs  TRACK: IOP 3     NUMBER OF PARTICIPANTS: 4    Summary of Group / Topics Discussed:  Relationship Skills: Assertive Communication: Patients were provided with a general overview of assertive communication skills and how practicing assertive communication skills will assist patients in developing healthier and more effective relationships. Patients reviewed their current awareness on ability to practice assertive communication, ways to increase assertive communication, and identified/problem solved barriers to assertive communication.     Patient Session Goals / Objectives:  Identified and discussed patient individual challenges with communication  Presented and practiced effective communication skills in session  Assisted patients in implementing more effective communication skills in their relationships      Patient Participation / Response:  Fully participated with the group by sharing personal reflections / insights and openly received / provided feedback with other participants.    Demonstrated understanding of topics discussed through group discussion and participation    Treatment Plan:  Patient has a current master individualized treatment plan.  See Epic treatment plan for more information.    ELLIE Poole

## 2023-12-14 NOTE — GROUP NOTE
Process Group Note    PATIENT'S NAME: Maria Alejandra Betts  MRN:   [id number]  :   2000  ACCT. NUMBER: [id number]  DATE OF SERVICE: 23  START TIME:  1:00 PM  END TIME:  1:50 PM  FACILITATOR: Bruna Saleem LICSW  TOPIC:  Process Group    Diagnoses:  1.  Major depressive disorder, recurrent episode, moderate (H)   F33.1        2.  Generalized anxiety disorder   F41.1    [facility] Mental Health Outpatient Programs  TRACK: iop3    NUMBER OF PARTICIPANTS: 4        Data:    Session content: At the start of this group, patients were invited to check in by identifying themselves, describing their current emotional status, and identifying issues to address in this group.   Area(s) of treatment focus addressed in this session included Symptom Management and Personal Safety.  Maria Alejandra states they have more energy today.  Identifies wanting to continue to focus on emotional regulation and on setting boundaries with self and family.  No safety concerns.  First visit to chiropractor today to address shoulder twitching.    Therapeutic Interventions/Treatment Strategies:  Psychotherapist offered support, feedback and validation and reinforced use of skills. Treatment modalities used include Cognitive Behavioral Therapy and Dialectical Behavioral Therapy.    Assessment:    Patient response:   Patient responded to session by accepting feedback, giving feedback, listening, focusing on goals, being attentive, accepting support, appearing alert, demonstrating behavior change, and verbalizing understanding    Possible barriers to participation / learning include: and no barriers identified    Health Issues:   None reported       Substance Use Review:   Substance Use: No active concerns identified.    Mental Status/Behavioral Observations  Appearance:   Appropriate   Eye Contact:   Good   Psychomotor Behavior: Normal   Attitude:   Cooperative   Orientation:   All  Speech   Rate / Production: Normal     Volume:  Normal   Mood:    Normal  Affect:    Appropriate   Thought Content:   Clear  Thought Form:  Coherent  Goal Directed  Logical     Insight:    Good     Plan:   Safety Plan: No current safety concerns identified.  Recommended that patient call 911 or go to the local ED should there be a change in any of these risk factors.   Barriers to treatment: None identified  Patient Contracts (see media tab):  None  Substance Use: Not addressed in session   Continue or Discharge: Patient will continue in Adult Day Treatment (ADT)  as planned. Patient is likely to benefit from learning and using skills as they work toward the goals identified in their treatment plan.      Bruna Saleem, [facility]  December 14, 2023

## 2023-12-18 ENCOUNTER — HOSPITAL ENCOUNTER (OUTPATIENT)
Dept: BEHAVIORAL HEALTH | Facility: CLINIC | Age: 23
Discharge: HOME | End: 2023-12-18
Attending: PSYCHIATRY & NEUROLOGY
Payer: COMMERCIAL

## 2023-12-18 PROCEDURE — 90853 GROUP PSYCHOTHERAPY: CPT | Performed by: COUNSELOR

## 2023-12-18 NOTE — GROUP NOTE
Process Group Note    PATIENT'S NAME: Maria Alejandra Betts  MRN:   [id number]  :   2000  ACCT. NUMBER: [id number]  DATE OF SERVICE: 23  START TIME:  1:00 PM  END TIME:  1:50 PM  FACILITATOR: Isabel Griffith LPCC  TOPIC:  Process Group    Diagnoses:  Major depressive disorder, recurrent episode, moderate (H)  F33.1          2. Generalized anxiety disorder  F41.1            By history, traumatic brain injury, borderline personality disorder  Have not ruled out 300.01 (F41.0) Panic Disorder    [facility] Mental Health Outpatient Programs  TRACK: IOP 3    NUMBER OF PARTICIPANTS: 3        Data:    Session content: At the start of this group, patients were invited to check in by identifying themselves, describing their current emotional status, and identifying issues to address in this group.   Area(s) of treatment focus addressed in this session included Symptom Management, Personal Safety, and Community Resources/Discharge Planning.    Patient reported feeling hopeful, proud and optimistic. Patient discussed working toward regulating emotions and boundaries. Patient identified coping cards, socialize and writing as skills they will use to address their goal(s). Patient reported negative self-talk and family opinions may be a barrier to working toward their goal(s) and/or addressing mental health symptoms. Patient reported no safety concerns and/or self-injurious behaviors. Patient reported no substance use. Patient reported they are taking their medications as prescribed. Patient reported feeling proud that they went to their job interview last week and got the job. 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     Therapeutic Interventions/Treatment Strategies:  Psychotherapist offered support, feedback and validation and reinforced use of skills. Treatment modalities used include Motivational Interviewing and Cognitive Behavioral  Therapy.    Assessment:    Patient response:   Patient responded to session by accepting feedback, giving feedback, listening, focusing on goals, and being attentive    Possible barriers to participation / learning include: and no barriers identified    Health Issues:   None reported       Substance Use Review:   Substance Use: alcohol  and cannabis .  and Last use: this past weekend     Mental Status/Behavioral Observations  Appearance:   Appropriate   Eye Contact:   Good   Psychomotor Behavior: Normal   Attitude:   Cooperative   Orientation:   All  Speech   Rate / Production: Normal/ Responsive Normal    Volume:  Normal   Mood:    Anxious  Depressed  Normal Sad   Affect:    Appropriate   Thought Content:   Clear and Safety denies any current safety concerns including suicidal ideation, self-harm, and homicidal ideation  Thought Form:  Coherent  Logical     Insight:    Good     Plan:   Safety Plan: No current safety concerns identified.  Recommended that patient call 911 or go to the local ED should there be a change in any of these risk factors.   Barriers to treatment: None identified  Patient Contracts (see media tab):  None  Substance Use: Provided encouragement towards sobriety   Continue or Discharge: Patient will continue in Adult Day Treatment (ADT)  as planned. Patient is likely to benefit from learning and using skills as they work toward the goals identified in their treatment plan.      Isabel Griffith, [facility]  December 18, 2023

## 2023-12-18 NOTE — GROUP NOTE
Psychotherapy Group Note    PATIENT'S NAME: Maria Alejandra Betts  MRN:   [id number]  :   2000  ACCT. NUMBER: [id number]  DATE OF SERVICE: 23  START TIME:  2:00 PM  END TIME:  2:50 PM  FACILITATOR: Bruna James LICSW  TOPIC: MH EBP Group: Relationship Skills  [facility] Adult Mental Health Outpatient Programs  TRACK: IOP3    NUMBER OF PARTICIPANTS: 4    Summary of Group / Topics Discussed:  Relationship Skills: Basic Communication: Patients were provided with a general overview of interpersonal communication skills and information about how communication skills impact symptoms and functioning. The goal of this topic is to help patients identify communication issues and gain skills to work towards healthier interpersonal communication. Patients reviewed their current awareness of communication issues and how communication skills impact relationships and functioning.  Reviewed sending whole messages, non-verbal messages, and digital messages.    Patient Session Goals / Objectives:  Identified and discussed patients individual challenges with basic communication  Presented and practiced effective communication skills in session  Assisted patients in implementing more effective communication skills in their relationships      Patient Participation / Response:  Fully participated with the group by sharing personal reflections / insights and openly received / provided feedback with other participants.    Identified / Expressed personal readiness to incorporate effective communication skills    Treatment Plan:  Patient has a current master individualized treatment plan.  See Epic treatment plan for more information.    ELLIE Farley

## 2023-12-18 NOTE — GROUP NOTE
Psychotherapy Group Note    PATIENT'S NAME: Maria Alejandra Betts  MRN:   [id number]  :   2000  ACCT. NUMBER: [id number]  DATE OF SERVICE: 23  START TIME:  2:00 PM  END TIME:  2:50 PM  FACILITATOR: Isabel Griffith LPCC  TOPIC:  EBP Group: Cognitive Restructuring  [facility] Mental Health Outpatient Programs  TRACK: IOP 3    NUMBER OF PARTICIPANTS: 3    Summary of Group / Topics Discussed:  Cognitive Restructuring: Introduction and Overview: Patients were introduced to Cognitive Behavioral Therapy (CBT) as a way to identify ineffective thought patterns, understand factors that contribute to ineffective thought patterns, gain awareness of the impact of those thoughts on emotions and behavior, and learn methods for modifying thinking to achieve better mood regulation. Patients received a general overview of how ones thoughts influence feelings and behaviors in the context of CBT. Patients explored the development of their own thought patterns and how they impact daily functioning.      Patient Session Goals / Objectives:  Familiarize self with the interrelationship of thoughts, feelings and behavior.  Identify ineffective / unhelpful thought patterns and their impact on mood.             Patient Participation / Response:  Fully participated with the group by sharing personal reflections / insights and openly received / provided feedback with other participants.    Demonstrated understanding of topics discussed through group discussion and participation    Treatment Plan:  Patient has a current master individualized treatment plan.  See Epic treatment plan for more information.    CLEMENT Lovett

## 2023-12-18 NOTE — GROUP NOTE
Process Group Note    PATIENT'S NAME: Maria Alejandra Betts  MRN:   [id number]  :   2000  ACCT. NUMBER: [id number]  DATE OF SERVICE: 23  START TIME:  1:00 PM  END TIME:  1:50 PM  FACILITATOR: Bruna James LICSW  TOPIC:  Process Group    Diagnoses:   Major depressive disorder, recurrent episode, moderate (H)  F33.1         2. Generalized anxiety disorder  F41.1            By history, traumatic brain injury, borderline personality disorder  Have not ruled out 300.01 (F41.0) Panic Disorder       [facility] Mental [facility] Outpatient Programs  TRACK: IOP3    NUMBER OF PARTICIPANTS: 5        Data:    Session content: At the start of this group, patients were invited to check in by identifying themselves, describing their current emotional status, and identifying issues to address in this group.   Area(s) of treatment focus addressed in this session included Symptom Management, Personal Safety, and Community Resources/Discharge Planning.  Maria Alejandra was welcomed and oriented to the IOP3 schedule.   They were transferred to this schedule due to an insurance issue.   They shared that they had just gotten comfortable with the other schedule so was frustrated to have to start over.  They shared that they have been dealing with anxiety and depression.  Client denied suicidal ideation, intent and plan.  They are taking medications as planned.   They participated inva humorous conversation in the group.    Therapeutic Interventions/Treatment Strategies:  Psychotherapist offered support, feedback and validation and reinforced use of skills. Treatment modalities used include Cognitive Behavioral Therapy. Interventions include Coping Skills: Promoted understanding of how and when to apply grounding strategies to reduce distress and increase presence in the moment.    Assessment:    Patient response:   Patient responded to session by focusing on goals, being attentive, and accepting support    Possible barriers  to participation / learning include: and no barriers identified    Health Issues:   None reported       Substance Use Review:   Substance Use: No active concerns identified.    Mental Status/Behavioral Observations  Appearance:   Appropriate   Eye Contact:   Good   Psychomotor Behavior: Normal   Attitude:   Cooperative  Friendly Pleasant  Orientation:   All  Speech   Rate / Production: Normal/ Responsive Normal    Volume:  Normal   Mood:    Anxious  Depressed   Affect:    Appropriate   Thought Content:   Clear  Thought Form:  Coherent  Logical     Insight:    Good     Plan:   Safety Plan: No current safety concerns identified.  Recommended that patient call 911 or go to the local ED should there be a change in any of these risk factors.   Barriers to treatment: None identified  Patient Contracts (see media tab):  None  Substance Use: Not addressed in session   Continue or Discharge: Patient will continue in Adult Day Treatment (ADT)  as planned. Patient is likely to benefit from learning and using skills as they work toward the goals identified in their treatment plan.      Bruna James, [facility]  December 18, 2023

## 2023-12-19 ENCOUNTER — TELEPHONE (OUTPATIENT)
Dept: BEHAVIORAL HEALTH | Facility: CLINIC | Age: 23
End: 2023-12-19
Payer: COMMERCIAL

## 2023-12-19 DIAGNOSIS — F41.1 GAD (GENERALIZED ANXIETY DISORDER): ICD-10-CM

## 2023-12-19 DIAGNOSIS — F33.1 MAJOR DEPRESSIVE DISORDER, RECURRENT EPISODE, MODERATE (H): ICD-10-CM

## 2023-12-19 RX ORDER — HYDROXYZINE HYDROCHLORIDE 25 MG/1
25-50 TABLET, FILM COATED ORAL 3 TIMES DAILY PRN
Qty: 30 TABLET | Refills: 0 | Status: SHIPPED | OUTPATIENT
Start: 2023-12-19 | End: 2024-06-13

## 2023-12-19 NOTE — GROUP NOTE
Psychotherapy Group Note    PATIENT'S NAME: Maria Alejandra Betts  MRN:   [id number]  :   2000  ACCT. NUMBER: [id number]  DATE OF SERVICE: 23  START TIME:  3:00 PM  END TIME:  3:50 PM  FACILITATOR: Alicia Waters LPCC  TOPIC:  EBP Group: Coping Skills  [facility] Mental Health Outpatient Programs  TRACK: IOP3    NUMBER OF PARTICIPANTS: 3    Summary of Group / Topics Discussed:  Coping Skills: Additional Coping Skills:  Patients discussed and practiced TIP skill.  Reviewed the benefits of applying the aforementioned coping strategies.  Patients explored how these strategies might be applied to daily stressors or distressing situations.    Patient Session Goals / Objectives:  Understand the purpose and benefits of applying TIP skill coping strategies  Practice TIP skill in session for mastery  Address barriers to utilizing coping skills when in distress.      Patient Participation / Response:  Fully participated with the group by sharing personal reflections / insights and openly received / provided feedback with other participants.    Demonstrated understanding of topics discussed through group discussion and participation, Expressed understanding of the relevance / importance of coping skills at distressing times in life, Demonstrated knowledge of when to consider using a variety of coping skills in daily life, Identified 2-3 positive coping strategies that have helped maintain / improve symptoms in the past, Practiced 2-3 new coping skills in session, and Identified / Expressed personal readiness to practice new coping skills    Treatment Plan:  Patient has a current master individualized treatment plan.  See Epic treatment plan for more information.    CLEMENT Calderon

## 2023-12-20 ENCOUNTER — HOSPITAL ENCOUNTER (OUTPATIENT)
Dept: BEHAVIORAL HEALTH | Facility: CLINIC | Age: 23
Discharge: HOME | End: 2023-12-20
Attending: PSYCHIATRY & NEUROLOGY
Payer: COMMERCIAL

## 2023-12-20 DIAGNOSIS — F60.3 BORDERLINE PERSONALITY DISORDER (H): ICD-10-CM

## 2023-12-20 DIAGNOSIS — F41.1 GAD (GENERALIZED ANXIETY DISORDER): ICD-10-CM

## 2023-12-20 DIAGNOSIS — F33.1 MAJOR DEPRESSIVE DISORDER, RECURRENT EPISODE, MODERATE (H): Primary | ICD-10-CM

## 2023-12-20 PROCEDURE — 99214 OFFICE O/P EST MOD 30 MIN: CPT | Performed by: PSYCHIATRY & NEUROLOGY

## 2023-12-20 PROCEDURE — 90853 GROUP PSYCHOTHERAPY: CPT

## 2023-12-20 PROCEDURE — 90853 GROUP PSYCHOTHERAPY: CPT | Performed by: SOCIAL WORKER

## 2023-12-20 RX ORDER — CITALOPRAM HYDROBROMIDE 40 MG/1
40 TABLET ORAL DAILY
Qty: 30 TABLET | Refills: 1 | Status: SHIPPED | OUTPATIENT
Start: 2023-12-20 | End: 2024-01-22

## 2023-12-20 NOTE — GROUP NOTE
Psychotherapy Group Note    PATIENT'S NAME: Maria Alejandra Betts  MRN:   [id number]  :   2000  ACCT. NUMBER: [id number]  DATE OF SERVICE: 23  START TIME:  2:00 PM  END TIME:  2:50 PM  FACILITATOR: Bruna Saleem LICSW  TOPIC: MH EBP Group: Enhanced Mindfulness  [facility] Adult Mental Health Outpatient Programs  TRACK: iop3    NUMBER OF PARTICIPANTS: 4    Summary of Group / Topics Discussed:  Enhanced Mindfulness: Body and Mind Integration: Patients received an overview and education regarding the importance of including the body in the management of emotional health and self-care and as a direct route to mindfulness practice.  Patients discussed various ways in which the body can serve as an informant to their physical and emotional experiences/need. Patients discussed the body as a direct link to the present moment and to mindfulness practice.  Patients discussed current relationship with body, self-awareness, mindfulness practice and barriers to connection with body.  Patients were guided through breath work and movement to facilitate greater self-awareness, grounding, self-expression, and connection to other.  Patients discussed how the experiential could be applied to better manage mental health and develop burkett connection to self.    Patient Session Goals / Objectives:  Identify how movement awareness could be used for grounding, stress management, self-expression, connection to other and self-regulation  Improved awareness of breath and movement preferences  Identify how movement and the body is used in mindfulness practice  Reflect on use of these practices in everyday life.  Identify barriers to attending to body      Patient Participation / Response:  Fully participated with the group by sharing personal reflections / insights and openly received / provided feedback with other participants.    Demonstrated understanding of topics discussed through group discussion and participation and  Practiced skills in session    Treatment Plan:  Patient has a current master individualized treatment plan.  See Epic treatment plan for more information.    ELLIE Burgess

## 2023-12-20 NOTE — GROUP NOTE
Psychotherapy Group Note    PATIENT'S NAME: Maria Alejandra Betts  MRN:   [id number]  :   2000  ACCT. NUMBER: [id number]  DATE OF SERVICE: 23  START TIME:  3:00 PM  END TIME:  3:50 PM  FACILITATOR: Leann Stone LICSW  TOPIC:  EBP Group: Symptom Awareness  [facility] Mental [facility] Outpatient Programs  TRACK: IOP 3    NUMBER OF PARTICIPANTS: 4    Summary of Group / Topics Discussed:  Symptom Awareness: Symptom Observation and Tracking: An overview of symptom observation and tracking was presented to help patients identify specific symptoms and identify patterns. This topic will also assist patient in identifying progress towards goal of decreasing severity of symptoms and increasing overall functioning. Patients completed a symptom check list in session. Patient was assisted in identifying baseline functioning, patterns, and ways to assess current symptoms. Patient was also assisted in identifying a tool or strategy to continue to track or monitor symptoms over a period of time.       Patient Session Goals / Objectives:  Identified patient individual symptoms and experiences  Identified potential symptom patterns and factors that contribute to changes in symptom severity      Patient Participation / Response:  Moderately participated, sharing some personal reflections / insights and adequately adequately received / provided feedback with other participants.    Demonstrated understanding of topics discussed through group discussion and participation    Treatment Plan:  Patient has a current master individualized treatment plan.  See Epic treatment plan for more information.    ELLIE Wong

## 2023-12-20 NOTE — PROGRESS NOTES
"Individualized Treatment Plan       PATIENT'S NAME: Maria Alejandra Betts   MRN:   [id number]  :   2000    LEVEL OF CARE/ PROGRAM PARTICIPATION:     Program Participation: Adult  Outpatient Programs: Track: Intensive Outpatient Program Frequency: 3 hours/day, 4 days/week. Anticipated duration: 9 weeks. (36 days/visits)     Program Admission Date: client started the DT5 group on 23 but needed to transfer to an IOP group due to insurance.  They had some absences between the therapy schedules.  Maria Alejandra started IOP3 on 2023     Program Anticipated Discharge Date: 2024.   Client plans to start new job.      Date of Treatment Plan: 23    Primary Diagnosis:  1. Major depressive disorder, recurrent episode, moderate (H)  F33.1 hydrOXYzine (ATARAX) 25 MG tablet       2. BRUCE (generalized anxiety disorder)  F41.1 hydrOXYzine (ATARAX) 25 MG tablet          By history, traumatic brain injury, borderline personality disorder  Have not ruled out 300.01 (F41.0) Panic Disorder    Multidisciplinary Team Members: [facility] 3 Igor Persaud MD; NINO Yanez, [facility]; Lakeisha Friend, [facility]     Chief Complaint: The reason for seeking services at this time is: \"\"regulating my emotions and setting boundaries \"     Long Term Goal: Patients long-term stated goal for treatment: \"I think my main goal is how to prepare myself for going back to normal like (job, school). I also want to continue on setting boundaries and be able to advocate for myself out loud. \"     Patient Participation in Plan:   Patient did contribute to goals and plan. Patient does  agree with plan. Patient did receive a copy of treatment plan. The treatment team will not have contact with patient's family or other supports regarding treatment planning because the patient  does not want to include family or other supports in treatment planning at this time. \"Emergency contact only.\"     NOTE: Patient will need to sign Informed Consent / Release of " "Information prior to any contact.     Patient Strengths:   caring, empathetic, goal-focused, good listener, insightful, motivated, and open to learning    Patient Limitations:  Suicidal Ideation   Anxiety  Depressive symptoms     Patient Health Issues:   None reported      Abuse Prevention Plan:   Treatment team will provide education regarding skill development to address symptom management, life skills, wellness, discharge planning, and personal safety.  Collaborate with patients internal and external providers to coordinate care.  Treatment will be provided in a safe, therapeutic environment.  Program provider will offer medication adjustment/management.      Discharge Criteria:  Patient will discharge from program when it is determined that factors leading to admission have been addressed to the extent that the patient can be safely transitioned to a less intensive level of care. See discharge goals/plan below.     Treatment Focus:      Area of Treatment Focus: Personal Safety          Goal Status: Active    Problem Description: Intermittent suicidal and self-injurious ideation.   Patient Identified Goal: \"I have a low level of suicidal ideation and a low level of self-injurious ideation.   I last acted on self-injury at the beginning of September 2023.  I use opposite to emotion, and not walking to leave marks on myself as coping skills.\"   Measurable Goal:  Patient will learn and apply coping strategies to manage suicidal or self-injurious behavior urges. Patient will use and/or develop their individualized copy plan for safety and notify staff of all safety concerns.   Goal Start Date: 12/20/23                                            Goal Target Date: 1/8/24   Review/Discharge Date: 1/8/24       Progress:  Maria Alejandra consistently used the coping plan for safety when having passive suicidal ideation.  They denied suicidal ideation at discharge.                           Intervention Strategies: Staff will " "provide therapeutic assessment and safety planning as needed.       Area of Treatment Focus: Symptom Management  Goal Status: Active    Problem Description:   Continue to work on decreasing anxiety and depression.    Measurable Goal:   Maria Alejandra will increase positive daily structure (balanced with responsibilities and leisure.)  They will practice interpersonal skills to improve ability to assert needs and set boundaries.  They will improve ability to be in the present moment through use of mindfulness skills.     Goal Start Date: 12/20/23                                            Goal Target Date: 1/8/24   Review/Discharge Date: 1/8/24       Progress:   Maria Alejandra consistently identified and practiced setting boundaries in family, friends, and dating relationships.                              Intervention Strategies: Staff will assist in identifying and applying coping skills, assist in identifying and problem solving barriers, and provide education.     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   Patient Identified Goal: \"I would like to start a new job and prepare to get my own housing.  I currently live with family.\"   Measurable Goal:  Patient will identify and explore how to effectively build and utilize their support relationships and resources  which include professional and personal support.      Goal Start Date: 12/20/23                                            Goal Target Date: 1/8/24   Review/Discharge Date: 1/8/24       Progress:  Maria Alejandra requested to discharge as they obtained a new job as a .  They are planning to earn enough money to allow them to return to living in their own housing.                             Intervention Strategies: Staff will assist in " "identifying and problem solving barriers and assist with establishing community resources to strengthen support network.       Assessments Completed:  The following assessments were completed by patient for this visit:  PHQ9:       7/12/2021     9:20 AM 12/24/2021    10:37 AM 2/7/2022    11:15 AM 1/26/2023     1:13 PM 11/13/2023    10:46 AM 11/28/2023     1:00 PM 12/10/2023     9:53 AM   PHQ-9 SCORE   PHQ-9 Total Score MyChart 1 (Minimal depression)  22 (Severe depression) 16 (Moderately severe depression)   16 (Moderately severe depression)   PHQ-9 Total Score 1 17 22 16 21 20 16     GAD7:       2/7/2022    11:16 AM 11/13/2023    10:46 AM 11/28/2023     1:00 PM 12/10/2023     9:54 AM   BRUCE-7 SCORE   Total Score 17 (severe anxiety)   15 (severe anxiety)   Total Score 17 11 10 15       Review Date: Does Maria Alejandra Betts continue to meet criteria to participate in the program?   12/20/23 staffing Yes - Igor Persaud MD on 12/20/2023 at 8:29 AM   1/8/24 discharged                       Acknowledgement of Current Treatment Plan       I have reviewed my treatment plan with my treatment team member (s) on 12/20/23.   I agree with the plan as it is written in the electronic health record.     Name:                   Signature:                                                          Date:  Maria Alejandra Persaud MD  Psychiatrist/Medical Director         NOTE: Patient signatures are completed manually and scanned into the electronic medical record. See \"Media\" tab in epic.       I have reviewed the patient's Individualized Treatment Plan and agree with the current goals, interventions and level of care.     Igor Persaud MD  12/20/2023   "

## 2023-12-20 NOTE — GROUP NOTE
Process Group Note    PATIENT'S NAME: Maria Alejandra Betts  MRN:   [id number]  :   2000  ACCT. NUMBER: [id number]  DATE OF SERVICE: 23  START TIME:  1:00 PM  END TIME:  1:50 PM  FACILITATOR: Priscilla Martínez LICSW  TOPIC:  Process Group    Diagnoses:  Major depressive disorder, recurrent episode, moderate (H)  F33.1           2. Generalized anxiety disorder  F41.1            By history, traumatic brain injury, borderline personality disorder  Have not ruled out 300.01 (F41.0) Panic Disorder    [facility] Mental Health Outpatient Programs  TRACK: IOP 3 (4B)    NUMBER OF PARTICIPANTS: 4        Data:    Session content: At the start of this group, patients were invited to check in by identifying themselves, describing their current emotional status, and identifying issues to address in this group.   Area(s) of treatment focus addressed in this session included Symptom Management, Personal Safety, Community Resources/Discharge Planning, Abstinence/Relapse Prevention, Develop / Improve Independent Living Skills, and Develop Socialization / Interpersonal Relationship Skills.    Maria Alejandra reports  feeling tired and irritable today. She reports less depressed and less anxious mood. Denies S/I or safety issues. Maria Alejandra is using coping skills for symptom management including coping cards, opposite to emotion and self compassion. She continues to assert needs and set boundaries with her mom and sister. She is looking forward to starting her new job. Maria Alejandra is creating a relapse prevention plan for the holiday weekend.       Therapeutic Interventions/Treatment Strategies:  Psychotherapist offered support, feedback and validation and reinforced use of skills. Treatment modalities used include Cognitive Behavioral Therapy.    Assessment:    Patient response:   Patient responded to session by accepting feedback, giving feedback, listening, focusing on goals, being attentive, and verbalizing  understanding    Possible barriers to participation / learning include: and no barriers identified    Health Issues:   None reported. Sleep is good. Reports medication compliance.       Substance Use Review:   Substance Use: No active concerns identified. Uses Nicotene.    Mental Status/Behavioral Observations  Appearance:   Appropriate   Eye Contact:   Good   Psychomotor Behavior: Normal   Attitude:   Cooperative  Interested  Orientation:   All  Speech   Rate / Production: Normal    Volume:  Normal   Mood:    Irritable mood today. Less depressed and less anxious mood overall.  Affect:    Appropriate   Thought Content:   Clear and Safety denies any current safety concerns including suicidal ideation, self-harm, and homicidal ideation  Thought Form:  Coherent  Goal Directed  Logical     Insight:    Good     Plan:   Safety Plan: No current safety concerns identified.  Recommended that patient call 911 or go to the local ED should there be a change in any of these risk factors.   Barriers to treatment: None identified  Patient Contracts (see media tab):  None  Substance Use: Not addressed in session   Continue or Discharge: Patient will continue in Adult Day Treatment (ADT)  as planned. Patient is likely to benefit from learning and using skills as they work toward the goals identified in their treatment plan.      Priscilla Martínez, [facility]  December 20, 2023

## 2023-12-20 NOTE — PROGRESS NOTES
"[facility]   Adult Mental Health Outpatient Programs  Provider Interval History Note    Program: Intensive Outpatient Program, track 3    PATIENT'S NAME: Maria Alejandra Betts  MRN:   [id number]  :   2000  ACCT. NUMBER: [id number]  DATE OF SERVICE: 23    Interval History:  \"Going a lot better.\" Maria Alejandra presents today for follow-up and ongoing program supervision.   Endorses:  \"Waking up in more postiive moods\"  \"Feeling well-rested for the most part\"   Increasingly motivated  \"I just got a job offer,\" hoping to start in January  Can start part-time and continue in group for a time    Symptoms/Systems:  Sleep: improving  Daily function/ADLs: improving    Substance use:  No change    Reactions/thoughts about program:  Finding it helpful, supportive    Safety Assessment:  Suicidal ideation: \"I had a little bit the weekend before [last]\"  Associated with being home alone  Denies today  Thoughts of non-suicidal self-injury: denied \"I might have had some urges last week... that night I was home alone\"  Recent self-injurious behavior: denied  Homicidal ideation: denied  Other safety concerns: denied    Medications:  Current Outpatient Medications   Medication Sig Dispense Refill    citalopram (CELEXA) 20 MG tablet Take 1 tablet (20 mg) by mouth daily Start with half tablet daily for 1-2 weeks then whole tablet 90 tablet 1    galcanezumab-gnlm (EMGALITY) 120 MG/ML injection Inject 1 mL (120 mg) Subcutaneous every 28 days 3 mL 3    hydrOXYzine HCl (ATARAX) 25 MG tablet Take 1-2 tablets (25-50 mg) by mouth 3 times daily as needed for anxiety (sleep) 30 tablet 0    ibuprofen (ADVIL/MOTRIN) 600 MG tablet Take 1 tablet (600 mg) by mouth every 6 hours as needed for moderate pain 30 tablet 0    propranolol (INDERAL) 10 MG tablet Take 1 tablet (10 mg) by mouth 2 times daily as needed (anxiety) 10 tablet 0       The above list was reviewed and updated in EPIC with patient today. "     Patient is taking medications as prescribed and denies adverse effects    Laboratory Results:  Most recent labs reviewed. Pertinent updates/findings: None.     Metrics:  PHQ-9 scores:       1/26/2023     1:13 PM 11/13/2023    10:46 AM 11/28/2023     1:00 PM 12/10/2023     9:53 AM   PHQ-9 SCORE   PHQ-9 Total Score MyChart 16 (Moderately severe depression)   16 (Moderately severe depression)   PHQ-9 Total Score 16 21 20 16       BRUCE-7 scores:       11/13/2023    10:46 AM 11/28/2023     1:00 PM 12/10/2023     9:54 AM   BRUCE-7 SCORE   Total Score   15 (severe anxiety)   Total Score 11 10 15       CSSR-S: [place] Suicide Severity Rating Scale (Short Version)      1/31/2022     6:27 PM 11/13/2023    10:00 AM 11/27/2023    11:00 AM   [place] Suicide Severity Rating (Short Version)   Over the past 2 weeks have you felt down, depressed, or hopeless? yes     Over the past 2 weeks have you had thoughts of killing yourself? yes     Have you ever attempted to kill yourself? no     Q1 Wished to be Dead (Past Month) yes yes    Q2 Suicidal Thoughts (Past Month) yes yes    Q3 Suicidal Thought Method no no    Q4 Suicidal Intent without Specific Plan no no    Q5 Suicide Intent with Specific Plan no no    Q6 Suicide Behavior (Lifetime) no yes    Comments  last year, med overdose - no medical attention sought    Within the Past 3 Months?  no    Level of Risk per Screen low risk moderate risk    Required Interventions Patient searched;Belongings removed;Room made safe     Interventions DEC consulted;Monitored via video     1. Wish to be Dead (Since Last Contact)   N   2. Non-Specific Active Suicidal Thoughts (Since Last Contact)   N   Actual Attempt (Since Last Contact)   N   Has subject engaged in non-suicidal self-injurious behavior? (Since Last Contact)   N   Interrupted Attempts (Since Last Contact)   N   Aborted or Self-Interrupted Attempt (Since Last Contact)   N   Preparatory Acts or Behavior (Since Last Contact)   N  "  Suicide (Since Last Contact)   N   Calculated C-SSRS Risk Score (Since Last Contact)   No Risk Indicated         Mental Status Examination:  Vital Signs: LMP 11/30/2023 (Within Weeks)    Appearance: well groomed, appears stated age, and in no apparent distress.  Attitude: cooperative   Eye Contact: good   Muscle Strength and Tone: no gross abnormalities   Psychomotor Behavior: normal or unremarkable   Gait and Station: deferred  Speech: normal rate, production, volume, and rhythm of  Associations: No loosening of associations  Thought Process: coherent and goal directed  Thought Content: no evidence of suicidal ideation or homicidal ideation, no evidence of psychotic thought, no auditory hallucinations present, and no visual hallucinations present  Mood: \"better\"  Affect: mood congruent, intensity is blunted, constricted mobility, restricted range, and reactive  Insight: good  Judgment: intact, adequate for safety  Impulse Control: intact  Oriented to: time, place, person, and situation  Attention Span and Concentration: normal  Language: Intact  Recent and Remote Memory: intact to interview. Not formally assessed. No amnesia.  Fund of Knowledge/Assessment of Intelligence: Average  Capacity of Activities of Daily Living: Independent, able to participate in programmatic care services.    Diagnosis/es:    ICD-10-CM    1. Major depressive disorder, recurrent episode, moderate (H)  F33.1       2. BRUCE (generalized anxiety disorder)  F41.1       3. Borderline personality disorder (H)  F60.3 citalopram (CELEXA) 40 MG tablet        By history, traumatic brain injury  Have not ruled out 300.01 (F41.0) Panic Disorder      Assessment/Plan:  Maria Alejandra presents today for follow up. Endorses continued improvement, though isolation remains a trigger for suicidal ideation and thoughts of nonsuicidal self-injury. Good benefit from current medications; no indication for changes today. Refills sent. Patient is planning to return to " work part-time and will benefit from continued stabilization in the meantime and as they return to work. Continue intensive outpatient.    Depression  Overall improved   Not a remitted  No changes to medications today  Continue intensive outpatient    Anxiety  Overall improved  Plan as noted above    Borderline personality disorder  Overall improved  Plan as noted above  Can consider dialectical behavioral therapy after discharge from [facility]    Continue all other medications as reviewed per electronic medical record today    Continue therapy as planned:  Enrolled in intensive outpatient  Continues to benefit from services  Continues to meet criteria for this level of care  Patient would be at reasonable risk of requiring a higher level of care in the absence of current services.  I feel this patient does not meet criteria for an involuntary hold and is appropriate for treatment at an outpatient level of care.  Continue with individual therapist as appropriate    Safety plan reviewed:  To the Emergency Department as needed or call after hours crisis line at [phone] or [phone]. [place] Crisis Text Line: Text [place] to 378567 or Suicide LifeLine Chat: suicidepreLive Current Medialine.org/chat    Follow-up:   schedule an appointment with me or another program provider in approximately in 4 week(s) or sooner if needed.  Can speak with a staff member or call the appropriate program number (see below) to schedule  Follow up with outpatient provider(s) as planned or sooner if needed for acute medical concerns.    Questions or concerns:  Call program line with questions or concerns (see below)  Mobifusionhart may be used to communicate with your provider, but this is not intended to be used for emergencies.    [facility] Adult Mental Health Program lines:  [facility]: [phone]  Dual Disorder: [phone]  Adult Day Treatment:  [phone]  55+/Intensive Outpatient: [phone]    Community Resources:    [place]  Suicide Prevention Lifeline: 988 from any phone, or 503-002-2423 (TTY: 602.406.2071). Call anytime for help.  (www.suicidepreventionlifeline.org)  National [place] on Mental Illness (www.[place].org): 646.897.4231 or 961-714-9372.   Mental Health Association (www.mentalhealth.org): 426.332.1254 or 709-755-1211.  [place] Crisis Text Line: Text [place] to 456289  Suicide LifeLine Chat: suicideBvents.org/chat    Treatment Objective(s) Addressed in This Session:  The purpose of today's virtual visit is for this writer to provide oversight of patient's care while receiving program services. Specific treatment goals addressed included personal safety, symptoms stabilization and management, wellness and mental health, and community resources/discharge planning.     This author or another program provider will follow up with the patient as noted above.     Patient agrees with the current plan of care.    Igor Persaud MD  12/20/23      Visit Details:  Type of service: In-person    Location (patient and provider): [facility] Adult Mental Health Outpatient Programmatic Care Offices    Level of Medical Decision Making:   - At least 1 chronic problem that is not stable  - Engaged in prescription drug management during visit (discussed any medication benefits, side effects, alternatives, etc.)  Discussion of management or test interpretation with external physician/other qualified healthcare professional/appropriate source - intensive outpatient treatment team    This document completed in part using Dragon Medical One dictation software.  Please excuse any inadvertent word or phrase substitutions.

## 2023-12-21 ENCOUNTER — HOSPITAL ENCOUNTER (OUTPATIENT)
Dept: BEHAVIORAL HEALTH | Facility: CLINIC | Age: 23
Discharge: HOME | End: 2023-12-21
Attending: PSYCHIATRY & NEUROLOGY
Payer: COMMERCIAL

## 2023-12-21 PROCEDURE — 90853 GROUP PSYCHOTHERAPY: CPT | Performed by: SOCIAL WORKER

## 2023-12-21 PROCEDURE — 90853 GROUP PSYCHOTHERAPY: CPT

## 2023-12-21 NOTE — GROUP NOTE
Psychotherapy Group Note    PATIENT'S NAME: Maria Alejandra Betts  MRN:   [id number]  :   2000  ACCT. NUMBER: [id number]  DATE OF SERVICE: 23  START TIME:  3:00 PM  END TIME:  3:50 PM  FACILITATOR: Leann Stone LICSW  TOPIC: MH EBP Group: Self-Awareness  [facility] Mental Health Outpatient Programs  TRACK: IOP 3    NUMBER OF PARTICIPANTS: 4    Summary of Group / Topics Discussed:  Self-Awareness: Gratitude: Topic focused on assisting patients in identifying key concepts in gratitude. Patients discussed the benefits of practicing gratitude and its impact on mood improvement, mindfulness, and perspective. Patients worked to increase time spent on recognition and appreciation of what is positive and working in their lives. Patients discussed the concepts and benefits of feeling grateful. The goal is to reduce rumination and negative thinking resulting in increased mindfulness and resilience. Patients specifically discussed how they can practice and problem solve barriers to daily gratitude practice.     Patient Session Goals / Objectives:  [place] the concept and benefits of gratitude  Identified ways to practice gratitude in daily life  Problem solved barriers to practicing gratitude      Patient Participation / Response:  Fully participated with the group by sharing personal reflections / insights and openly received / provided feedback with other participants.    Demonstrated understanding of topics discussed through group discussion and participation, Identified / Expressed readiness to act intentionally, increase self-compassion, promote personal growth, and Verbalized understanding of ways to proactively manage illness    Treatment Plan:  Patient has a current master individualized treatment plan.  See Epic treatment plan for more information.    ELLIE Wong

## 2023-12-21 NOTE — GROUP NOTE
Psychotherapy Group Note    PATIENT'S NAME: Maria Alejandra Betts  MRN:   [id number]  :   2000  ACCT. NUMBER: [id number]  DATE OF SERVICE: 23  START TIME:  2:00 PM  END TIME:  2:50 PM  FACILITATOR: Bruna James LICSW  TOPIC: MH EBP Group: Coping Skills  [facility] Adult Mental Health Outpatient Programs  TRACK: IOP3    NUMBER OF PARTICIPANTS: 4    Summary of Group / Topics Discussed:  Coping Skills: Additional Coping Skills:  Patients discussed and practiced Breaks Ahead.  Reviewed the benefits of applying the aforementioned coping strategies.  Patients explored how these strategies might be applied to daily stressors or distressing situations.    Patient Session Goals / Objectives:  Understand the purpose and benefits of applying Breaks Ahead coping strategies  Client's completed a worksheet identifying skills to manage an upcoming stressor.  Address barriers to utilizing coping skills when in distress.      Patient Participation / Response:  Fully participated with the group by sharing personal reflections / insights and openly received / provided feedback with other participants.    Demonstrated knowledge of when to consider using a variety of coping skills in daily life and Identified barriers to applying coping skills    Treatment Plan:  Patient has a current master individualized treatment plan.  See Epic treatment plan for more information.    ELLIE Farley

## 2023-12-21 NOTE — GROUP NOTE
Process Group Note    PATIENT'S NAME: Maria Alejandra Betts  MRN:   [id number]  :   2000  ACCT. NUMBER: [id number]  DATE OF SERVICE: 23  START TIME:  1:00 PM  END TIME:  1:50 PM  FACILITATOR: Bruna James LICSW  TOPIC:  Process Group    Diagnoses:   Major depressive disorder, recurrent episode, moderate (H)  F33.1         2. Generalized anxiety disorder  F41.1            By history, traumatic brain injury, borderline personality disorder  Have not ruled out 300.01 (F41.0) Panic Disorder       [facility] Mental Health Outpatient Programs  TRACK: IOP3    NUMBER OF PARTICIPANTS: 4        Data:    Session content: At the start of this group, patients were invited to check in by identifying themselves, describing their current emotional status, and identifying issues to address in this group.   Area(s) of treatment focus addressed in this session included Symptom Management.  Maria Alejandra reported feeling tired and having a headaches.   She reported feeling anxious and nervous about starting the new job as a  in a different agency.  They shared that they have goals of using emotional regulation and boundaries.   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  They shared that they had some passive suicidal ideation last night but reported that it is better today.  Client denied suicidal ideation, intent and plan.  They plan to discharge from the program prior to the 23 start of the new job.      Therapeutic Interventions/Treatment Strategies:  Psychotherapist offered support, feedback and validation and reinforced use of skills. Treatment modalities used include Cognitive Behavioral Therapy. Interventions include Coping Skills: Promoted understanding of how and when to apply grounding strategies to reduce distress and increase presence in the moment.    Assessment:    Patient response:   Patient responded to session by giving feedback, listening, and  focusing on goals    Possible barriers to participation / learning include: and no barriers identified    Health Issues:   None reported       Substance Use Review:   Substance Use: No active concerns identified.    Mental Status/Behavioral Observations  Appearance:   Appropriate   Eye Contact:   Good   Psychomotor Behavior: Normal   Attitude:   Cooperative  Friendly Pleasant  Orientation:   All  Speech   Rate / Production: Normal    Volume:  Normal   Mood:    Anxious  Depressed   Affect:    Appropriate   Thought Content:   Clear  Thought Form:  Coherent  Logical     Insight:    Good     Plan:   Safety Plan: No current safety concerns identified.  Recommended that patient call 911 or go to the local ED should there be a change in any of these risk factors.   Barriers to treatment: None identified  Patient Contracts (see media tab):  None  Substance Use: Not addressed in session   Continue or Discharge: Patient will continue in Adult Day Treatment (ADT)  as planned. Patient is likely to benefit from learning and using skills as they work toward the goals identified in their treatment plan.      Bruna James, [facility]  December 21, 2023

## 2023-12-26 NOTE — DISCHARGE SUMMARY
"       Adult Mental Health Intensive Outpatient Discharge Summary/Instructions      Patient: Maria Alejandra Betts MRN: [id number]   : 2000 Age: 23 year old Sex: female     Admission Date: 23  Discharge Date: 24  Diagnosis:    Major depressive disorder, recurrent episode, moderate (H)  F33.1         2. Generalized anxiety disorder  F41.1            By history, traumatic brain injury, borderline personality disorder  Have not ruled out 300.01 (F41.0) Panic Disorder       Focus of Treatment / Progress    Personal Safety: Maria Alejandra reported intermittent passive suicidal ideation.  They consistently implemented coping skills.     * Follow your safety plan     * Call crisis lines as needed:    [facility] [phone] [facility] [phone]  [facility] [phone] Crisis Connection [phone]  [facility] [phone] [facility] [phone]  [facility] [phone] National Suicide Prevention [phone]  [facility] [phone] Suicide Prevention [phone]  [facility] [phone]    Managing symptoms of:  Depression, anxiety    Community support/health:  Maria Alejandra is preparing for starting a new job on 24.  They are currently living with family.    Managing Symptoms and Preventing Relapse    * Go to all of your appointments    * Take all medications as directed.      * Carry a current list if medication with you    * Do not use illicit (street) drugs.  Avoid alcohol    * Report these symptoms to your care team. These are early signs of relapse:   Thoughts of suicide   Losing more sleep   Increased confusion   Mood getting worse   Feeling more aggressive   Other:  increased isolation, urge to self-harm, increased high emotions    *Use these skills daily:  Talk to someone you trust at least one time weekly, set boundaries and say \"no\", be assertive, act opposite of negative feelings, accept challenges with a positive attitude, exercise at least three times per week " for 30 minutes,  get enough sleep, eat healthy foods, get into a good routine     Copy of summary sent to: EPIC    Follow up with psychiatrist / main caregiver: Interested in going back to psychiatrist at [facility] after IOP.     Next visit: Need to call to set up appointment.    Psychological testing at [facility] - referral placed by Dr. Persaud     Follow up with your therapist: Art Therapist at [facility].   Completed two visits.  Maria Alejandra would prefer a BIPOC provider and who works with LGBTQIA+ people.  Team will complete a referral list and provide it to Maria Alejandra.    Next visit: As scheduled by Maria Alejandra.    Go to group therapy and / or support groups at: Consider [facility] Connections free support groups.  See www.namimn.org fro listings.  General interest groups, young adult groups, BIPOC groups and LGBTQIA+ groups are available.    See your medical doctor about:  General care needs.    Other:  Your treatment team appreciates having the opportunity to work with you and wishes you the best.     Client Signature:_______________________  Date / Time:___________      Staff Signature:________________________   Date / Time:___________

## 2023-12-27 ENCOUNTER — TELEPHONE (OUTPATIENT)
Dept: BEHAVIORAL HEALTH | Facility: CLINIC | Age: 23
End: 2023-12-27
Payer: COMMERCIAL

## 2024-01-02 ENCOUNTER — HOSPITAL ENCOUNTER (OUTPATIENT)
Dept: BEHAVIORAL HEALTH | Facility: CLINIC | Age: 24
Discharge: HOME | End: 2024-01-02
Attending: PSYCHIATRY & NEUROLOGY
Payer: COMMERCIAL

## 2024-01-02 PROCEDURE — 90853 GROUP PSYCHOTHERAPY: CPT | Performed by: SOCIAL WORKER

## 2024-01-02 NOTE — GROUP NOTE
Psychotherapy Group Note    PATIENT'S NAME: Maria Alejandra Betts  MRN:   [id number]  :   2000  ACCT. NUMBER: [id number]  DATE OF SERVICE: 24  START TIME:  2:00 PM  END TIME:  2:50 PM  FACILITATOR: Bruna James LICSW  TOPIC:  EBP Group: Coping Skills  [facility] Outpatient [facility]  TRACK: IOP3/DT2    NUMBER OF PARTICIPANTS: 7    Summary of Group / Topics Discussed:  Coping Skills: Grounding: Patients discussed and practiced strategies to increase attachment / presence to the current moment.  Patients identified situations in which using these strategies will help improve emotion regulation sense of calm in the body.  Reviewed the benefits of applying grounding strategies, as well as past / current practices of each member.  Patients identified situations in which using these strategies would reduce stress. They developed the ability to distinguish when these strategies can be useful in their lives for management and stress and psychological well-being.    Patient Session Goals / Objectives:  Understand the purpose of using grounding strategies to reduce stress.  Verbalize understanding of how and when to apply grounding strategies to reduce distress and increase presence in the moment.  Review patients current grounding practices and discuss a more formal way of practicing and accessing skills.  Practice using various calming strategies (e.g. 5-4-3-2-1; mental and body awareness).  Choose 1-2 grounding strategies to apply during times of distress.      Patient Participation / Response:  Fully participated with the group by sharing personal reflections / insights and openly received / provided feedback with other participants.    Demonstrated knowledge of when to consider using a variety of coping skills in daily life and Practiced 2-3 new coping skills in session    Treatment Plan:  Patient has a current master individualized treatment plan.  See Epic treatment plan for more  information.    Bruna James, [facility]

## 2024-01-02 NOTE — GROUP NOTE
Process Group Note    PATIENT'S NAME: Maria Alejandra Betts  MRN:   [id number]  :   2000  ACCT. NUMBER: [id number]  DATE OF SERVICE: 24  START TIME:  1:00 PM  END TIME:  1:50 PM  FACILITATOR: Bruna James LICSW  TOPIC:  Process Group    Diagnoses:   Major depressive disorder, recurrent episode, moderate (H)  F33.1         2. Generalized anxiety disorder  F41.1            By history, traumatic brain injury, borderline personality disorder  Have not ruled out 300.01 (F41.0) Panic Disorder       [facility] Outpatient Programs  TRACK: IOP3/DT2    NUMBER OF PARTICIPANTS: 7        Data:    Session content: At the start of this group, patients were invited to check in by identifying themselves, describing their current emotional status, and identifying issues to address in this group.   Area(s) of treatment focus addressed in this session included Symptom Management, Personal Safety, and Community Resources/Discharge Planning.  Maria Alejandra reported increased anxiety since being ill and missing some medication doses.   They reported stress today due to missing a ride and the bus.    They decided to try the bus at the next  time instead of skipping the program today.  They reported goal is emotion regulation.   They are trying to set boundaries and avoid isolation.    They barriers are negative self-talk.  They reported some passive suicidal ideation.    They are anxious about starting new job next week.  They did use cannabis and drink alcohol.  They shared that they are having mixed emotions about the recent ex-girlfriend.   They shared that the girlfriend has given them mixed messages singe she kicked Maria Alejandra out of their shared home a few months ago.  Group gave feedback on says to set limits and boundaries.      Therapeutic Interventions/Treatment Strategies:  Psychotherapist offered support, feedback and validation and reinforced use of skills. Treatment modalities used include  "Cognitive Behavioral Therapy. Interventions include Coping Skills: Assisted patient in identifying 1-2 healthy distraction skills to reduce overall distress and Discussed how the use of intentional \"in the moment\" actions can help reduce distress.    Assessment:    Patient response:   Patient responded to session by listening, focusing on goals, and being attentive    Possible barriers to participation / learning include: and no barriers identified    Health Issues:   None reported       Substance Use Review:   Substance Use: No active concerns identified.    Mental Status/Behavioral Observations  Appearance:   Appropriate   Eye Contact:   Good   Psychomotor Behavior: Normal   Attitude:   Cooperative  Pleasant  Orientation:   All  Speech   Rate / Production: Normal    Volume:  Normal   Mood:    Anxious  Depressed   Affect:    Appropriate   Thought Content:   Clear  Thought Form:  Coherent  Logical     Insight:    Good     Plan:   Safety Plan: No current safety concerns identified.  Recommended that patient call 911 or go to the local ED should there be a change in any of these risk factors.   Barriers to treatment: None identified  Patient Contracts (see media tab):  None  Substance Use: Not addressed in session   Continue or Discharge: Patient will continue in Adult Day Treatment (ADT)  as planned. Patient is likely to benefit from learning and using skills as they work toward the goals identified in their treatment plan.      Bruna James, [facility]  January 2, 2024  "

## 2024-01-02 NOTE — GROUP NOTE
Psychoeducation Group Note    PATIENT'S NAME: Maria Alejandra Betts  MRN:   [id number]  :   2000  ACCT. NUMBER: [id number]  DATE OF SERVICE: 24  START TIME:  3:00 PM  END TIME:  3:50 PM  FACILITATOR: Bruna Saleem LICSW; Shelby Richter OTR  TOPIC: MH Life Skills Group: Lifestyle Balance and Structure  [facility]patient Programs  TRACK: dt 2 and iop 3 merged    NUMBER OF PARTICIPANTS: 6    Summary of Group / Topics Discussed:   Lifestyle Balance and Structure: Life Balance: Patients were introduced to the importance of leisure, self care, productivity, and social participation in support of mental health management by exploring a balanced lifestyle.  Patients identified how they spend their time and skills to bring this into better balance.  Patients identified a self care, productive, leisure, and social participation activity that they would like to complete this week. Patients practiced the skill of planning to identify when and where they could complete these activities. Patients shared with their peers their plans for the week. Validation, support, and guidance provided throughout group.   Discuss the importance of life balance, structure, and routine   Identified and planned 4 activities in the areas of leisure, productivity, social participation and self care.   Self reflected on the benefits of structure and routine in their life Lifestyle Balance and Structure: Life Balance: Patients were introduced to the importance of leisure, self care, productivity, and social participation in support of mental health management by exploring a balanced lifestyle.  Patients identified how they spend their time and skills to bring this into better balance.  Patients identified a self care, productive, leisure, and social participation activity that they would like to complete this week. Patients practiced the skill of planning to identify when and where they could complete these  activities. Patients shared with their peers their plans for the week. Validation, support, and guidance provided throughout group.   Discuss the importance of life balance, structure, and routine   Identified and planned 4 activities in the areas of leisure, productivity, social participation and self care.   Self reflected on the benefits of structure and routine in their life       Patient Participation / Response:  Fully participated with the group by sharing personal reflections / insights and openly received / provided feedback with other participants.    Verbalized understanding of content    Treatment Plan:  Patient has a current master individualized treatment plan.  See Epic treatment plan for more information.    KATHERINE BurgessSW

## 2024-01-03 ENCOUNTER — HOSPITAL ENCOUNTER (OUTPATIENT)
Dept: BEHAVIORAL HEALTH | Facility: CLINIC | Age: 24
Discharge: HOME | End: 2024-01-03
Attending: PSYCHIATRY & NEUROLOGY
Payer: COMMERCIAL

## 2024-01-03 PROCEDURE — 90853 GROUP PSYCHOTHERAPY: CPT | Performed by: SOCIAL WORKER

## 2024-01-03 PROCEDURE — 90853 GROUP PSYCHOTHERAPY: CPT

## 2024-01-03 NOTE — GROUP NOTE
Psychotherapy Group Note    PATIENT'S NAME: Maria Alejandra Betts  MRN:   [id number]  :   2000  ACCT. NUMBER: [id number]  DATE OF SERVICE: 24  START TIME:  3:00 PM  END TIME:  3:50 PM  FACILITATOR: Lakeisha Friend LICSW  TOPIC: MH EBP Group: Self-Awareness  [facility] Mental [facility] Outpatient Programs  TRACK: IOP 3    NUMBER OF PARTICIPANTS: 3    Summary of Group / Topics Discussed:  Self-Awareness: Gratitude: Topic focused on assisting patients in identifying key concepts in gratitude. Patients discussed the benefits of practicing gratitude and its impact on mood improvement, mindfulness, and perspective. Patients worked to increase time spent on recognition and appreciation of what is positive and working in their lives. Patients discussed the concepts and benefits of feeling grateful. The goal is to reduce rumination and negative thinking resulting in increased mindfulness and resilience. Patients specifically discussed how they can practice and problem solve barriers to daily gratitude practice.     Patient Session Goals / Objectives:  [place] the concept and benefits of gratitude  Identified ways to practice gratitude in daily life  Problem solved barriers to practicing gratitude      Patient Participation / Response:  Fully participated with the group by sharing personal reflections / insights and openly received / provided feedback with other participants.    Demonstrated understanding of topics discussed through group discussion and participation, Demonstrated understanding of values, strengths, and challenges to learn about themselves, and Identified / Expressed readiness to act intentionally, increase self-compassion, promote personal growth    Treatment Plan:  Patient has a current master individualized treatment plan.  See Epic treatment plan for more information.    ELLIE Hernández

## 2024-01-03 NOTE — GROUP NOTE
"Process Group Note    PATIENT'S NAME: Maria Alejandra Betts  MRN:   [id number]  :   2000  ACCT. NUMBER: [id number]  DATE OF SERVICE: 24  START TIME:  1:00 PM  END TIME:  1:50 PM  FACILITATOR: Bruna James LICSW  TOPIC:  Process Group    Diagnoses:   Major depressive disorder, recurrent episode, moderate (H)  F33.1         2. Generalized anxiety disorder  F41.1            By history, traumatic brain injury, borderline personality disorder  Have not ruled out 300.01 (F41.0) Panic Disorder       [facility] Outpatient Programs  TRACK: IOP3    NUMBER OF PARTICIPANTS: 3        Data:    Session content: At the start of this group, patients were invited to check in by identifying themselves, describing their current emotional status, and identifying issues to address in this group.   Area(s) of treatment focus addressed in this session included Symptom Management, Personal Safety, and Community Resources/Discharge Planning.  Maria Alejandra reported that they are feeling calm, anxious and tired.   They are working on setting boundaries, socializing, listening to music, and using their coping cares.    They had some passive suicidal ideation las night but denied intent and plan.  They used marijuana.    They asked for support from their sister then spent time together.   They are proud of getting to group today as they felt embarrassed about how much they shared yesterday.  They had some sleep disturbance and used a combination of a sound machine and \"brown noise\" to help them sleep.      Therapeutic Interventions/Treatment Strategies:  Psychotherapist offered support, feedback and validation and reinforced use of skills. Treatment modalities used include Cognitive Behavioral Therapy. Interventions include Coping Skills: Reviewed patients current calming practices and discussed a more formal way of practicing and accessing skills and Discussed meditation skills and addressed ways to implement " meditation skills .    Assessment:    Patient response:   Patient responded to session by giving feedback, listening, and being attentive    Possible barriers to participation / learning include: and no barriers identified    Health Issues:   None reported       Substance Use Review:   Substance Use: No active concerns identified.    Mental Status/Behavioral Observations  Appearance:   Appropriate   Eye Contact:   Good   Psychomotor Behavior: Normal   Attitude:   Cooperative  Interested Pleasant  Orientation:   All  Speech   Rate / Production: Normal    Volume:  Normal   Mood:    Anxious  Depressed   Affect:    Appropriate   Thought Content:   Safety reports  presence of suicidal ideation passive suicidal ideation   Thought Form:  Coherent  Logical     Insight:    Good     Plan:   Safety Plan: No current safety concerns identified.  Recommended that patient call 911 or go to the local ED should there be a change in any of these risk factors.   Barriers to treatment: None identified  Patient Contracts (see media tab):  None  Substance Use: Not addressed in session   Continue or Discharge: Patient will continue in Adult Day Treatment (ADT)  as planned. Patient is likely to benefit from learning and using skills as they work toward the goals identified in their treatment plan.      Bruna James, [facility]  January 3, 2024

## 2024-01-03 NOTE — GROUP NOTE
Psychotherapy Group Note    PATIENT'S NAME: Maria Alejandra Betts  MRN:   [id number]  :   2000  ACCT. NUMBER: [id number]  DATE OF SERVICE: 24  START TIME:  2:00 PM  END TIME:  2:50 PM  FACILITATOR: Bruna James LICSW  TOPIC:  EBP Group: Behavioral Activation  [facility] Outpatient Programs  TRACK: IOP3    NUMBER OF PARTICIPANTS: 3    Summary of Group / Topics Discussed:  Behavioral Activation: Motivation and Procrastination: Patients explored how they currently spend their time, identifying thoughts and feelings that are motivating and serve to increase desired behaviors.  They also examined behaviors that contribute to procrastination.  Different types of procrastination behaviors were identified, and strategies to reduce individual procrastination and increase motivation were explored and practiced.  Patients identified ways to increase goal-directed activities to enhance mood and reduce symptoms.        Patient Session Goals / Objectives:  Identify current patterns of procrastination behavior and how they influence thoughts and moods, and inhibit motivation.  Identify behaviors that can be implemented that contribute to improving thoughts and feelings, motivation, and reduce symptoms.  Identify and develop a plan to increase activities that promote a sense of accomplishment and competence.  Practice scheduling positive activities / behaviors into daily routines.  Reviewed decision making challenges and decision making skills.      Patient Participation / Response:  Fully participated with the group by sharing personal reflections / insights and openly received / provided feedback with other participants.    Expressed understanding of the relationship between behaviors, thoughts, and feelings and Identified / Expressed personal readiness to make behavioral change    Treatment Plan:  Patient has a current master individualized treatment plan.  See Epic treatment plan for more  information.    Bruna James, [facility]

## 2024-01-04 ENCOUNTER — HOSPITAL ENCOUNTER (OUTPATIENT)
Dept: BEHAVIORAL HEALTH | Facility: CLINIC | Age: 24
Discharge: HOME | End: 2024-01-04
Attending: PSYCHIATRY & NEUROLOGY
Payer: COMMERCIAL

## 2024-01-04 PROCEDURE — 90853 GROUP PSYCHOTHERAPY: CPT | Performed by: SOCIAL WORKER

## 2024-01-04 PROCEDURE — 90853 GROUP PSYCHOTHERAPY: CPT

## 2024-01-04 PROCEDURE — 90853 GROUP PSYCHOTHERAPY: CPT | Performed by: COUNSELOR

## 2024-01-04 NOTE — GROUP NOTE
Psychotherapy Group Note    PATIENT'S NAME: Maria Alejandra Betts  MRN:   [id number]  :   2000  ACCT. NUMBER: [id number]  DATE OF SERVICE: 24  START TIME:  3:00 PM  END TIME:  3:50 PM  FACILITATOR: Lakeisha Friend LICSW  TOPIC:  EBP Group: Self-Awareness  [facility] Mental Health Outpatient Programs  TRACK: IOP 3/DT 2    NUMBER OF PARTICIPANTS: 6    Summary of Group / Topics Discussed:  Self-Awareness: Self-Compassion: Patients received overview of key concepts in developing self-compassion. Patients discussed mindfulness, self-kindness, and finding common humanity. Patients identified their current approach to problems in their lives and learned skills for increasing self-compassion. Patients identified ways they can put self-compassion skills into practice and problem solve barriers to application of skills.     Patient Session Goals / Objectives:  [place] components of self-compassion  Identify ways to practice self-compassion in daily life  Problem solve barriers to self-compassion practice      Patient Participation / Response:  Fully participated with the group by sharing personal reflections / insights and openly received / provided feedback with other participants.    Demonstrated understanding of topics discussed through group discussion and participation, Demonstrated understanding of values, strengths, and challenges to learn about themselves, Identified / Expressed readiness to act intentionally, increase self-compassion, promote personal growth, and Practiced skills in session    Treatment Plan:  Patient has See Epic Treatment Plan - Patient is discharging.    ELLIE Hernández

## 2024-01-04 NOTE — GROUP NOTE
Psychotherapy Group Note    PATIENT'S NAME: Maria Alejandra Betts  MRN:   [id number]  :   2000  ACCT. NUMBER: [id number]  DATE OF SERVICE: 24  START TIME:  2:00 PM  END TIME:  2:55 PM  FACILITATOR: Amena Negrete RN; Josh So LMFT  TOPIC:  EBP Group: Behavioral Activation  [facility] Outpatient [facility]  TRACK: IOP 3 and DT 2    NUMBER OF PARTICIPANTS: 5    Summary of Group / Topics Discussed:  Behavioral Activation: [place]: Patients identified situations that prompt unwanted and unhelpful emotions / thoughts / behaviors.   Patients discussed how to problem solve by proactively using coping skills in potentially difficult situations. Components included describing the situation, brainstorming coping skills, imagining how scenario can/will unfold, rehearsing the action plan, and practicing relaxation to follow.  Patients practiced using these skills to reduce symptom distress and increase effective coping  behaviors.      Patient Session Goals / Objectives:  Identify difficult situation(s), and gain proficiency with alternative behaviors / skills to problem solve.  Increase confidence using coping skills through group practice in session.  Receive and provide feedback regarding skill development.  Apply coping skills in daily life situations.      Patient Participation / Response:  Moderately participated, sharing some personal reflections / insights and adequately adequately received / provided feedback with other participants.    Expressed understanding of the relationship between behaviors, thoughts, and feelings    Treatment Plan:  Patient has a current master individualized treatment plan.  See Epic treatment plan for more information.    BRIGIDA Velazquez

## 2024-01-04 NOTE — GROUP NOTE
Process Group Note    PATIENT'S NAME: Maria Alejandra Betts  MRN:   [id number]  :   2000  ACCT. NUMBER: [id number]  DATE OF SERVICE: 24  START TIME:  1:00 PM  END TIME:  1:50 PM  FACILITATOR: Bruna James LICSW  TOPIC:  Process Group    Diagnoses:   Major depressive disorder, recurrent episode, moderate (H)  F33.1         2. Generalized anxiety disorder  F41.1            By history, traumatic brain injury, borderline personality disorder  Have not ruled out 300.01 (F41.0) Panic Disorder       [facility] Out[facility]  TRACK: IOP3    NUMBER OF PARTICIPANTS: 7        Data:    Session content: At the start of this group, patients were invited to check in by identifying themselves, describing their current emotional status, and identifying issues to address in this group.   Area(s) of treatment focus addressed in this session included Symptom Management, Personal Safety, and Community Resources/Discharge Planning.  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  They feel optimistic about the new job.  They will continue to work on emotional regulation and boundary setting skills.    They shared that a barrier may be negative self-talk.  Client denied suicidal ideation, intent and plan.  They are taking medications as planned.   They used marijuana and nicotine since last session.  They are grateful for the program and the support they received in the group.    Therapeutic Interventions/Treatment Strategies:  Psychotherapist offered support, feedback and validation and reinforced use of skills. Treatment modalities used include Cognitive Behavioral Therapy. Interventions include Behavioral Activation: Explored how behaviors effect mood and interact with thoughts and feelings.    Assessment:    Patient response:   Patient responded to session by listening, focusing on goals, and being  attentive    Possible barriers to participation / learning include: and no barriers identified    Health Issues:   None reported       Substance Use Review:   Substance Use: No active concerns identified.    Mental Status/Behavioral Observations  Appearance:   Appropriate   Eye Contact:   Good   Psychomotor Behavior: Normal   Attitude:   Cooperative  Interested Friendly  Orientation:   All  Speech   Rate / Production: Normal    Volume:  Normal   Mood:    Anxious  Depressed   Affect:    Appropriate   Thought Content:   Clear  Thought Form:  Coherent  Logical     Insight:    Good     Plan:   Safety Plan: No current safety concerns identified.  Recommended that patient call 911 or go to the local ED should there be a change in any of these risk factors.   Barriers to treatment: None identified  Patient Contracts (see media tab):  None  Substance Use: Not addressed in session   Continue or Discharge: Patient is being discharged today. See Treatment Plan and Discharge Summary.       Bruna James, [facility]  January 4, 2024

## 2024-01-05 NOTE — RESULT ENCOUNTER NOTE
Hello,    Your results were normal.  No anemia  Normal liver function and pancreas.  If needed the next step might be imaging like a CT scan.    An elimination diet is also very helpful if this is GI based.  Kenzie Walker MD

## 2024-01-13 DIAGNOSIS — F60.3 BORDERLINE PERSONALITY DISORDER (H): ICD-10-CM

## 2024-01-22 ENCOUNTER — OFFICE VISIT (OUTPATIENT)
Dept: URGENT CARE | Facility: URGENT CARE | Age: 24
End: 2024-01-22
Payer: COMMERCIAL

## 2024-01-22 VITALS
SYSTOLIC BLOOD PRESSURE: 124 MMHG | BODY MASS INDEX: 21.22 KG/M2 | WEIGHT: 140 LBS | HEIGHT: 68 IN | TEMPERATURE: 97.8 F | OXYGEN SATURATION: 100 % | DIASTOLIC BLOOD PRESSURE: 85 MMHG | HEART RATE: 63 BPM

## 2024-01-22 DIAGNOSIS — J39.2 THROAT IRRITATION: Primary | ICD-10-CM

## 2024-01-22 DIAGNOSIS — Z72.0 TOBACCO ABUSE DISORDER: ICD-10-CM

## 2024-01-22 DIAGNOSIS — J34.89 SORE IN NOSE: ICD-10-CM

## 2024-01-22 LAB
DEPRECATED S PYO AG THROAT QL EIA: NEGATIVE
FLUAV AG SPEC QL IA: NEGATIVE
FLUBV AG SPEC QL IA: NEGATIVE
GROUP A STREP BY PCR: NOT DETECTED

## 2024-01-22 PROCEDURE — 87804 INFLUENZA ASSAY W/OPTIC: CPT | Performed by: PHYSICIAN ASSISTANT

## 2024-01-22 PROCEDURE — 87651 STREP A DNA AMP PROBE: CPT | Performed by: PHYSICIAN ASSISTANT

## 2024-01-22 PROCEDURE — 87635 SARS-COV-2 COVID-19 AMP PRB: CPT | Performed by: PHYSICIAN ASSISTANT

## 2024-01-22 PROCEDURE — 99214 OFFICE O/P EST MOD 30 MIN: CPT | Performed by: PHYSICIAN ASSISTANT

## 2024-01-22 RX ORDER — MUPIROCIN 20 MG/G
OINTMENT TOPICAL 2 TIMES DAILY
Qty: 15 G | Refills: 0 | Status: SHIPPED | OUTPATIENT
Start: 2024-01-22 | End: 2024-02-01

## 2024-01-22 RX ORDER — CITALOPRAM HYDROBROMIDE 40 MG/1
40 TABLET ORAL DAILY
Qty: 90 TABLET | Refills: 3 | Status: SHIPPED | OUTPATIENT
Start: 2024-01-22 | End: 2024-06-13

## 2024-01-22 NOTE — PATIENT INSTRUCTIONS
1.  Plenty of fluids, rest, warm compresses on face  2.  Mucinex twice daily for at least 4 days  3.  Dai Pot 1x in the morning 1x at night (SALINE MIST SPRAY IS AN ACCEPTABLE, THOUGH NOT AS EFFECTIVE REPLACEMENT)  4.  Benadryl (diphenhydramine) at bedtime   5.  Either Claritin (Loratadine), Allegra (Fexofenadine), or Zyrtec (Cetirizine) in the day  6.  Flonase (Fluticasone) 2x each nostril twice a day for two weeks, then once each nostril once a day  7. Ibuprofen and/or tylenol as tolerated       Please let us know if symptoms persist, or worsen.  Fever and focal pain may be a sign of a bacterial infection which may need antibiotic treatment

## 2024-01-22 NOTE — TELEPHONE ENCOUNTER
Patient reported not taking hydroxyzine.  Will refuse this automated refill request per Dr. Persaud.

## 2024-01-22 NOTE — LETTER
January 22, 2024      Maria Alejandra Betts  [address] [address]        To Whom It May Concern:    Maria Alejandra Betts  was seen on 1/22.  Please excuse her until 1/24 due to illness.        Sincerely,        Jacek Rizzo PA-C

## 2024-01-22 NOTE — PROGRESS NOTES
"SUBJECTIVE:   Maria Alejandra Betts is a 23 year old female presenting with a chief complaint of fever, chills, sweats, cough - non-productive, sore throat, facial pain/pressure, headache, body aches, and fatigue.  Onset of symptoms was 1 week(s) ago.  Course of illness is same.    Severity moderate  Current and Associated symptoms: as above  Treatment measures tried include Fluids and Rest.  Predisposing factors include  provider.    No past medical history on file.  Current Outpatient Medications   Medication Sig Dispense Refill    citalopram (CELEXA) 40 MG tablet TAKE 1 TABLET (40 MG) BY MOUTH DAILY START WITH HALF TABLET DAILY FOR 1-2 WEEKS THEN WHOLE TABLET 90 tablet 3    galcanezumab-gnlm (EMGALITY) 120 MG/ML injection Inject 1 mL (120 mg) Subcutaneous every 28 days (Patient not taking: Reported on 12/20/2023) 3 mL 3    hydrOXYzine HCl (ATARAX) 25 MG tablet Take 1-2 tablets (25-50 mg) by mouth 3 times daily as needed for anxiety (sleep) (Patient not taking: Reported on 1/22/2024) 30 tablet 0    ibuprofen (ADVIL/MOTRIN) 600 MG tablet Take 1 tablet (600 mg) by mouth every 6 hours as needed for moderate pain (Patient not taking: Reported on 1/22/2024) 30 tablet 0    propranolol (INDERAL) 10 MG tablet Take 1 tablet (10 mg) by mouth 2 times daily as needed (anxiety) (Patient not taking: Reported on 12/20/2023) 10 tablet 0     Social History     Tobacco Use    Smoking status: Every Day     Types: Vaping Device    Smokeless tobacco: Never   Substance Use Topics    Alcohol use: Yes     Comment: rarely       ROS:  Review of systems negative except as stated above.    OBJECTIVE:  /85   Pulse 63   Temp 97.8  F (36.6  C) (Temporal)   Ht 1.727 m (5' 8\")   Wt 63.5 kg (140 lb)   LMP 01/22/2024 (Within Weeks)   SpO2 100%   BMI 21.29 kg/m    GENERAL APPEARANCE: healthy, alert and no distress  HENT: ear canals and TM's normal.  Nose and mouth without ulcers, erythema or lesions  NECK: supple, nontender, no " lymphadenopathy  RESP: lungs clear to auscultation - no rales, rhonchi or wheezes  CV: regular rates and rhythm, normal S1 S2, no murmur noted  NEURO: Normal strength and tone, sensory exam grossly normal,  normal speech and mentation  SKIN: no suspicious lesions or rashes    ASSESSMENT:  (J39.2) Throat irritation  (primary encounter diagnosis)  Comment: viral syndrome  Plan: Streptococcus A Rapid Screen w/Reflex to PCR -         Clinic Collect, Group A Streptococcus PCR         Throat Swab, Symptomatic COVID-19 Virus         (Coronavirus) by PCR, Influenza A & B Antigen -        Clinic Collect    (J34.89) Sore in nose  Comment: treating for impetigo, consider cold sore  Plan: mupirocin (BACTROBAN) 2 % external ointment    (Z72.0) Tobacco abuse disorder  Plan: discontinue at least while ill

## 2024-01-23 ENCOUNTER — TELEPHONE (OUTPATIENT)
Dept: URGENT CARE | Facility: URGENT CARE | Age: 24
End: 2024-01-23

## 2024-01-23 LAB — SARS-COV-2 RNA RESP QL NAA+PROBE: NEGATIVE

## 2024-01-23 NOTE — TELEPHONE ENCOUNTER
Pt is calling back regarding new symptoms. Pt would like to report that new symptoms of pain in Ribs due to coughing. Pt stated pain is present when she is coughing and bending over. Pt also stated her congestion is worsening.    - pt would like recommended medications below sent to pharmacy. She'd also like to know if she needs a chest x-ray    Per chart review from 1/22/24 visit:     Instructions    1.  Plenty of fluids, rest, warm compresses on face  2.  Mucinex twice daily for at least 4 days  3.  Dai Pot 1x in the morning 1x at night (SALINE MIST SPRAY IS AN ACCEPTABLE, THOUGH NOT AS EFFECTIVE REPLACEMENT)  4.  Benadryl (diphenhydramine) at bedtime   5.  Either Claritin (Loratadine), Allegra (Fexofenadine), or Zyrtec (Cetirizine) in the day  6.  Flonase (Fluticasone) 2x each nostril twice a day for two weeks, then once each nostril once a day  7. Ibuprofen and/or tylenol as tolerated       Please let us know if symptoms persist, or worsen.  Fever and focal pain may be a sign of a bacterial infection which may need antibiotic treatment        Okay to leave detailed message when calling pt.      Dominic Farias Cem Say, BSN RN  [facility]

## 2024-01-24 ENCOUNTER — VIRTUAL VISIT (OUTPATIENT)
Dept: URGENT CARE | Facility: CLINIC | Age: 24
End: 2024-01-24
Payer: COMMERCIAL

## 2024-01-24 DIAGNOSIS — J22 LOWER RESPIRATORY INFECTION: Primary | ICD-10-CM

## 2024-01-24 DIAGNOSIS — R68.83 CHILLS: ICD-10-CM

## 2024-01-24 PROCEDURE — 99213 OFFICE O/P EST LOW 20 MIN: CPT | Mod: 95

## 2024-01-24 RX ORDER — DOXYCYCLINE 100 MG/1
100 CAPSULE ORAL 2 TIMES DAILY
Qty: 14 CAPSULE | Refills: 0 | Status: SHIPPED | OUTPATIENT
Start: 2024-01-24 | End: 2024-01-31

## 2024-01-24 RX ORDER — BENZONATATE 100 MG/1
100 CAPSULE ORAL 3 TIMES DAILY PRN
Qty: 30 CAPSULE | Refills: 0 | Status: SHIPPED | OUTPATIENT
Start: 2024-01-24 | End: 2024-06-13

## 2024-01-24 NOTE — PROGRESS NOTES
Maria Alejandra is a 23 year old female who presents for a billable video visit.    ASSESSMENT/PLAN:  Diagnoses and all orders for this visit:    Lower respiratory infection  -     doxycycline hyclate (VIBRAMYCIN) 100 MG capsule; Take 1 capsule (100 mg) by mouth 2 times daily for 7 days  -     benzonatate (TESSALON) 100 MG capsule; Take 1 capsule (100 mg) by mouth 3 times daily as needed for cough    Chills      Based on duration, symptoms and history, will treat with the above therapy.   Patient Instructions   Take antibiotic as directed. Take daily probiotic (ex. Culturelle) and yogurt (ex. Activia or greek yogurt) while on antibiotic and continue for 10 days after completion of antibiotic.     Increase fluids with water, Pedialyte, Gatorade, or rehydrating beverages. Alternate Tylenol and Ibuprofen as needed for aches, pains or fever. Follow clear liquid to BRAT diet (bananas, rice, applesauce, toast) as needed for any upset stomach. Rest as much as possible. Use OTC cough and cold medication, I recommend Mucinex during the day, Robitussin at night. Run humidifier at night. May try to help immune system with Vitamin C and Zinc. Follow up in clinic if symptoms persist or worsen.         Follow up with primary care provider with any problems, questions or concerns or if symptoms worsen or fail to improve. Patient agreed to plan and verbalized understanding.     SUBJECTIVE:  Maria Alejandra presents with reports of shortness of breath, productive cough, worsening symptoms x 2 weeks. She was sick and seen in [facility] and received negative COVID Strep and flu test. She somewhat improved then began to feel ill again. She is experiencing night sweats.     ROS: Pertinent ROS neg other than the symptoms noted above in the HPI.     OBJECTIVE:  Vitals not done due to this being a virtual visit    GENERAL: healthy, alert and no distress  EYES: Eyes grossly normal to inspection,conjunctivae and sclerae normal  RESP: Able to speak in complete  sentences, no audible wheeze or cough  SKIN: no suspicious lesions or rashes  NEURO: mentation intact and speech normal  PSYCH: mentation appears normal, affect normal/bright    Video-Visit Details    Type of service:  Video Visit  Video Start Time: 0832  Video End Time: 0841    Originating Location: [place]    Distant Location:  [facility]     Platform used for Video Visit: Amanda English PA-C

## 2024-01-24 NOTE — PATIENT INSTRUCTIONS
Take antibiotic as directed. Take daily probiotic (ex. Culturelle) and yogurt (ex. Activia or greek yogurt) while on antibiotic and continue for 10 days after completion of antibiotic.     Increase fluids with water, Pedialyte, Gatorade, or rehydrating beverages. Alternate Tylenol and Ibuprofen as needed for aches, pains or fever. Follow clear liquid to BRAT diet (bananas, rice, applesauce, toast) as needed for any upset stomach. Rest as much as possible. Use OTC cough and cold medication, I recommend Mucinex during the day, Robitussin at night. Run humidifier at night. May try to help immune system with Vitamin C and Zinc. Follow up in clinic if symptoms persist or worsen.

## 2024-03-29 ENCOUNTER — VIRTUAL VISIT (OUTPATIENT)
Dept: URGENT CARE | Facility: CLINIC | Age: 24
End: 2024-03-29
Payer: COMMERCIAL

## 2024-03-29 DIAGNOSIS — J06.9 UPPER RESPIRATORY TRACT INFECTION, UNSPECIFIED TYPE: Primary | ICD-10-CM

## 2024-03-29 PROCEDURE — 99207 PR NO CHARGE LOS: CPT

## 2024-03-29 NOTE — PROGRESS NOTES
Maria Alejandra presents via video for evaluation of URI symptoms.  She had strep 2 weeks ago took antibiotics and symptoms improved symptoms have started again.  Symptoms first began 5 days ago with tickle in her throat and then a sore throat for most of the week.  Now her throat feels scratchy and itchy and she sees some red and white bumps in the back of her throat.  She has not been feverish or having feeling chilled.  She has had a cough head congestion runny nose headache fatigue and shortness of breath.  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    Because she recently had strep, rapid test is likely to be positive from her initial infection regardless of whether it is active and recurrent. A culture can confirm current active infection.  She did also have influenza causing his symptoms or different virus.  It is after 4 PM on Friday, testing sites are closed for the day. I recommend follow-up to be seen in person for further evaluation and testing to determine the next best step.    Genesis Mistry PA-C  [facility]

## 2024-04-21 ENCOUNTER — HEALTH MAINTENANCE LETTER (OUTPATIENT)
Age: 24
End: 2024-04-21

## 2024-04-22 ENCOUNTER — OFFICE VISIT (OUTPATIENT)
Dept: URGENT CARE | Facility: URGENT CARE | Age: 24
End: 2024-04-22
Payer: COMMERCIAL

## 2024-04-22 VITALS
WEIGHT: 150 LBS | HEIGHT: 69 IN | BODY MASS INDEX: 22.22 KG/M2 | RESPIRATION RATE: 14 BRPM | DIASTOLIC BLOOD PRESSURE: 76 MMHG | OXYGEN SATURATION: 100 % | TEMPERATURE: 98.1 F | HEART RATE: 88 BPM | SYSTOLIC BLOOD PRESSURE: 115 MMHG

## 2024-04-22 DIAGNOSIS — N89.8 VAGINAL DISCHARGE: Primary | ICD-10-CM

## 2024-04-22 DIAGNOSIS — L29.9 ITCHING: ICD-10-CM

## 2024-04-22 LAB
ALBUMIN UR-MCNC: 100 MG/DL
APPEARANCE UR: CLEAR
BILIRUB UR QL STRIP: ABNORMAL
CLUE CELLS: ABNORMAL
COLOR UR AUTO: YELLOW
GLUCOSE UR STRIP-MCNC: NEGATIVE MG/DL
HGB UR QL STRIP: ABNORMAL
KETONES UR STRIP-MCNC: ABNORMAL MG/DL
LEUKOCYTE ESTERASE UR QL STRIP: ABNORMAL
NITRATE UR QL: NEGATIVE
PH UR STRIP: 5.5 [PH] (ref 5–7)
RBC #/AREA URNS AUTO: ABNORMAL /HPF
SP GR UR STRIP: >=1.03 (ref 1–1.03)
SQUAMOUS #/AREA URNS AUTO: ABNORMAL /LPF
TRICHOMONAS, WET PREP: ABNORMAL
UROBILINOGEN UR STRIP-ACNC: 0.2 E.U./DL
WBC #/AREA URNS AUTO: ABNORMAL /HPF
WBC'S/HIGH POWER FIELD, WET PREP: ABNORMAL
YEAST, WET PREP: ABNORMAL

## 2024-04-22 PROCEDURE — 99213 OFFICE O/P EST LOW 20 MIN: CPT | Performed by: FAMILY MEDICINE

## 2024-04-22 PROCEDURE — 87086 URINE CULTURE/COLONY COUNT: CPT | Performed by: FAMILY MEDICINE

## 2024-04-22 PROCEDURE — 87210 SMEAR WET MOUNT SALINE/INK: CPT | Performed by: FAMILY MEDICINE

## 2024-04-22 PROCEDURE — 81001 URINALYSIS AUTO W/SCOPE: CPT | Performed by: FAMILY MEDICINE

## 2024-04-22 RX ORDER — FLUCONAZOLE 150 MG/1
TABLET ORAL
Qty: 2 TABLET | Refills: 0 | Status: SHIPPED | OUTPATIENT
Start: 2024-04-22 | End: 2024-06-13

## 2024-04-22 RX ORDER — AMOXICILLIN 500 MG/1
500 TABLET, FILM COATED ORAL 2 TIMES DAILY
COMMUNITY
End: 2024-06-13

## 2024-04-22 NOTE — PROGRESS NOTES
Assessment & Plan     Itching  - UA Macroscopic with reflex to Microscopic and Culture - Clinic Collect  - Wet prep - Clinic Collect  - UA Microscopic with Reflex to Culture  - Urine Culture    Vaginal discharge  - fluconazole (DIFLUCAN) 150 MG tablet  Dispense: 2 tablet; Refill: 0     Given her reports of a white discharge we will go ahead and prescribe diflucan therapy as she is on an antibiotic despite wet prep findings. Patient declines repeat STD testing. Symptoms are mild. No clear evidence of UTI based on history, sample provided shows contamination with squamous cells. Urine culture is pending as is meeting threshold of WBCs if symptoms develop then can treat based on culture findings.     Nickolas Saleh MD   [facility]    Kiesha Bess is a 24 year old female who presents to clinic today for the following health issues:  Chief Complaint   Patient presents with    Urgent Care    Vaginal Itching     Patient presents with vaginal itching, blood and white discharge. Pt is currently on an antibiotic.      HPI    Patient presents with vaginal discomfort and now white discharge this is while she is on Augmentin therapy for sinus infection.  She is never had yeast infection or bacterial vaginosis before.  She is up-to-date on her STD testing this was actually last done in the week and prior.  No obvious UTI symptoms such as frequency,dysuria or hematuria.        Patient Active Problem List    Diagnosis Date Noted    MDD (major depressive disorder), recurrent episode, moderate (H) 11/28/2023     Priority: Medium    Major depressive disorder, recurrent episode, moderate (H) 11/13/2023     Priority: Medium    Menorrhagia with irregular cycle 02/06/2023     Priority: Medium    BRUCE (generalized anxiety disorder) 02/06/2023     Priority: Medium    Depression, unspecified depression type 02/06/2023     Priority: Medium    Borderline personality disorder (H) 01/26/2023     Priority: Medium     "Migraine without aura and without status migrainosus, not intractable 07/25/2021     Priority: Medium    Migraine with aura and without status migrainosus, not intractable 07/12/2021     Priority: Medium       Current Outpatient Medications   Medication Sig Dispense Refill    amoxicillin (AMOXIL) 500 MG tablet Take 500 mg by mouth 2 times daily      citalopram (CELEXA) 40 MG tablet TAKE 1 TABLET (40 MG) BY MOUTH DAILY START WITH HALF TABLET DAILY FOR 1-2 WEEKS THEN WHOLE TABLET 90 tablet 3    fluconazole (DIFLUCAN) 150 MG tablet Take 1 tablet with onset of yeast infection, repeat in 2 days if not completely resolved 2 tablet 0    propranolol (INDERAL) 10 MG tablet Take 1 tablet (10 mg) by mouth 2 times daily as needed (anxiety) 10 tablet 0    benzonatate (TESSALON) 100 MG capsule Take 1 capsule (100 mg) by mouth 3 times daily as needed for cough (Patient not taking: Reported on 4/22/2024) 30 capsule 0    hydrOXYzine HCl (ATARAX) 25 MG tablet Take 1-2 tablets (25-50 mg) by mouth 3 times daily as needed for anxiety (sleep) (Patient not taking: Reported on 1/22/2024) 30 tablet 0    ibuprofen (ADVIL/MOTRIN) 600 MG tablet Take 1 tablet (600 mg) by mouth every 6 hours as needed for moderate pain (Patient not taking: Reported on 1/22/2024) 30 tablet 0     No current facility-administered medications for this visit.           Objective    /76 (BP Location: Right arm)   Pulse 88   Temp 98.1  F (36.7  C) (Temporal)   Resp 14   Ht 1.753 m (5' 9\")   Wt 68 kg (150 lb)   SpO2 100%   BMI 22.15 kg/m    Physical Exam       GEN: NAD  Results for orders placed or performed in visit on 04/22/24   UA Macroscopic with reflex to Microscopic and Culture - Clinic Collect     Status: Abnormal    Specimen: Urine, Midstream   Result Value Ref Range    Color Urine Yellow Colorless, Straw, Light Yellow, Yellow    Appearance Urine Clear Clear    Glucose Urine Negative Negative mg/dL    Bilirubin Urine Small (A) Negative    Ketones " Urine Trace (A) Negative mg/dL    Specific Gravity Urine >=1.030 1.003 - 1.035    Blood Urine Small (A) Negative    pH Urine 5.5 5.0 - 7.0    Protein Albumin Urine 100 (A) Negative mg/dL    Urobilinogen Urine 0.2 0.2, 1.0 E.U./dL    Nitrite Urine Negative Negative    Leukocyte Esterase Urine Small (A) Negative   UA Microscopic with Reflex to Culture     Status: Abnormal   Result Value Ref Range    RBC Urine None Seen 0-2 /HPF /HPF    WBC Urine 10-25 (A) 0-5 /HPF /HPF    Squamous Epithelials Urine Many (A) None Seen /LPF   Wet prep - Clinic Collect     Status: Abnormal    Specimen: Vagina; Swab   Result Value Ref Range    Trichomonas Absent Absent    Yeast Absent Absent    Clue Cells Absent Absent    WBCs/high power field 3+ (A) None                     The use of Dragon/[email or web address] dictation services may have been used to construct the content in this note; any grammatical or spelling errors are non-intentional. Please contact the author of this note directly if you are in need of any clarification.

## 2024-04-24 LAB — BACTERIA UR CULT: NO GROWTH

## 2024-05-09 ENCOUNTER — OFFICE VISIT (OUTPATIENT)
Dept: URGENT CARE | Facility: URGENT CARE | Age: 24
End: 2024-05-09
Payer: COMMERCIAL

## 2024-05-09 VITALS
SYSTOLIC BLOOD PRESSURE: 104 MMHG | RESPIRATION RATE: 16 BRPM | OXYGEN SATURATION: 99 % | HEIGHT: 69 IN | WEIGHT: 145 LBS | BODY MASS INDEX: 21.48 KG/M2 | HEART RATE: 71 BPM | TEMPERATURE: 97.9 F | DIASTOLIC BLOOD PRESSURE: 80 MMHG

## 2024-05-09 DIAGNOSIS — R07.0 THROAT PAIN: ICD-10-CM

## 2024-05-09 DIAGNOSIS — J06.9 VIRAL URI: Primary | ICD-10-CM

## 2024-05-09 DIAGNOSIS — G43.909 MIGRAINE WITHOUT STATUS MIGRAINOSUS, NOT INTRACTABLE, UNSPECIFIED MIGRAINE TYPE: ICD-10-CM

## 2024-05-09 PROCEDURE — 87651 STREP A DNA AMP PROBE: CPT | Performed by: STUDENT IN AN ORGANIZED HEALTH CARE EDUCATION/TRAINING PROGRAM

## 2024-05-09 PROCEDURE — 99214 OFFICE O/P EST MOD 30 MIN: CPT | Mod: 25 | Performed by: STUDENT IN AN ORGANIZED HEALTH CARE EDUCATION/TRAINING PROGRAM

## 2024-05-09 PROCEDURE — 96372 THER/PROPH/DIAG INJ SC/IM: CPT | Performed by: STUDENT IN AN ORGANIZED HEALTH CARE EDUCATION/TRAINING PROGRAM

## 2024-05-09 PROCEDURE — 87635 SARS-COV-2 COVID-19 AMP PRB: CPT | Performed by: STUDENT IN AN ORGANIZED HEALTH CARE EDUCATION/TRAINING PROGRAM

## 2024-05-09 PROCEDURE — 87804 INFLUENZA ASSAY W/OPTIC: CPT | Performed by: STUDENT IN AN ORGANIZED HEALTH CARE EDUCATION/TRAINING PROGRAM

## 2024-05-09 RX ORDER — SUMATRIPTAN 50 MG/1
50 TABLET, FILM COATED ORAL
Qty: 30 TABLET | Refills: 0 | Status: SHIPPED | OUTPATIENT
Start: 2024-05-09 | End: 2024-06-13

## 2024-05-09 RX ORDER — KETOROLAC TROMETHAMINE 30 MG/ML
30 INJECTION, SOLUTION INTRAMUSCULAR; INTRAVENOUS ONCE
Status: COMPLETED | OUTPATIENT
Start: 2024-05-09 | End: 2024-05-09

## 2024-05-09 RX ADMIN — KETOROLAC TROMETHAMINE 30 MG: 30 INJECTION, SOLUTION INTRAMUSCULAR; INTRAVENOUS at 14:48

## 2024-05-09 NOTE — LETTER
May 9, 2024      Maria Alejandra Betts  [address] [address]        To Whom It May Concern:    Maria Alejandra Betts  was seen on 5/9/24.  Please excuse her 5/9/24-5/10/24 due to illness.        Sincerely,        Bushra Linton PA-C

## 2024-05-09 NOTE — PROGRESS NOTES
ASSESSMENT & PLAN:   Diagnoses and all orders for this visit:  Viral URI  Throat pain  -     Streptococcus A Rapid Screen w/Reflex to PCR - Clinic Collect  -     Group A Streptococcus PCR Throat Swab  -     Influenza A & B Antigen - Clinic Collect  -     Symptomatic COVID-19 Virus (Coronavirus) by PCR Nose  Migraine without status migrainosus, not intractable, unspecified migraine type  -     ketorolac (TORADOL) injection 30 mg  -     SUMAtriptan (IMITREX) 50 MG tablet; Take 1 tablet (50 mg) by mouth at onset of headache for migraine May repeat in 2 hours. Max 4 tablets/24 hours.    Sore throat x 2 days. RST negative, PCR pending. Influenza test negative. COVID test pending. Symptoms consistent with viral URI. Rest, fluids, Tylenol/ibuprofen. Migraine intermittently x 1 week. Typical of previous migraines. Dose of Toradol given in clinic with some improvement but not full resolution. Refill of Imitrex sent in. Follow-up with PCP for migraine management..    At the end of the encounter, I discussed results, diagnosis, medications. Discussed red flags for immediate return to clinic/ER, as well as indications for follow up if no improvement. Patient and/or caregiver understood and agreed to plan. Patient was stable for discharge.    Patient Instructions   Rapid strep test negative. Culture is pending -we will call you if this is positive.  For your sore throat, you may try salt water gargles, tea with honey, throat lozenges/spray (Cepacol), using a humidifier.  Take tylenol and/or ibuprofen as needed for pain/fever.  Stay well-hydrated, get plenty of rest, and wash your hands often.   Follow-up with your PCP in 7-10 days if symptoms persist, sooner if symptoms worsen.      No follow-ups on file.    ------------------------------------------------------------------------  SUBJECTIVE  History was obtained from patient.    Patient presents with:  Urgent Care: Sore throat, congestion and migraine x 3 days.     [facility]  Maria Alejandra  SAMANTHA Betts is a(n) 24 year old female presenting to urgent care for sore throat x 2 days. Feels mucus in throat, hot flashes, nausea, dry cough. Had an episode of vomiting 2 days ago. No sinus congestion, fever, diarrhea. 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 Reports she has had intermittent migraine x 1 week. She previously had been on migraine medication but is not anymore. This feels typical of her migraines.    Review of Systems    Current Outpatient Medications   Medication Sig Dispense Refill    SUMAtriptan (IMITREX) 50 MG tablet Take 1 tablet (50 mg) by mouth at onset of headache for migraine May repeat in 2 hours. Max 4 tablets/24 hours. 30 tablet 0    amoxicillin (AMOXIL) 500 MG tablet Take 500 mg by mouth 2 times daily (Patient not taking: Reported on 5/9/2024)      benzonatate (TESSALON) 100 MG capsule Take 1 capsule (100 mg) by mouth 3 times daily as needed for cough (Patient not taking: Reported on 4/22/2024) 30 capsule 0    citalopram (CELEXA) 40 MG tablet TAKE 1 TABLET (40 MG) BY MOUTH DAILY START WITH HALF TABLET DAILY FOR 1-2 WEEKS THEN WHOLE TABLET (Patient not taking: Reported on 5/9/2024) 90 tablet 3    fluconazole (DIFLUCAN) 150 MG tablet Take 1 tablet with onset of yeast infection, repeat in 2 days if not completely resolved (Patient not taking: Reported on 5/9/2024) 2 tablet 0    hydrOXYzine HCl (ATARAX) 25 MG tablet Take 1-2 tablets (25-50 mg) by mouth 3 times daily as needed for anxiety (sleep) (Patient not taking: Reported on 1/22/2024) 30 tablet 0    ibuprofen (ADVIL/MOTRIN) 600 MG tablet Take 1 tablet (600 mg) by mouth every 6 hours as needed for moderate pain (Patient not taking: Reported on 1/22/2024) 30 tablet 0    propranolol (INDERAL) 10 MG tablet Take 1 tablet (10 mg) by mouth 2 times daily as needed (anxiety) (Patient not taking: Reported on 5/9/2024) 10 tablet 0     Problem List:  2023-11: MDD (major depressive disorder), recurrent episode, moderate   (H)  2023-11: Major depressive disorder, recurrent  "episode, moderate (H)  2023-02: Menorrhagia with irregular cycle  2023-02: BRUCE (generalized anxiety disorder)  2023-02: Depression, unspecified depression type  2023-01: Borderline personality disorder (H)  2021-07: Migraine without aura and without status migrainosus, not   intractable  2021-07: Migraine with aura and without status migrainosus, not   intractable    Allergies   Allergen Reactions    Seasonal Allergies          OBJECTIVE  Vitals:    05/09/24 1328   BP: 104/80   Pulse: 71   Resp: 16   Temp: 97.9  F (36.6  C)   TempSrc: Temporal   SpO2: 99%   Weight: 65.8 kg (145 lb)   Height: 1.753 m (5' 9\")     Physical Exam   GENERAL: healthy, alert, no acute distress.   PSYCH: mentation appears normal. Normal affect  HEAD: normocephalic, atraumatic.  EYE: PERRL. EOMs intact. No scleral injection bilaterally.   EAR: external ear normal. Bilateral ear canals normal and nonpainful. Bilateral TM intact, pearly, translucent without bulging.  NOSE: external nose atraumatic without lesions.  OROPHARYNX: moist mucous membranes. Posterior oropharynx with mild erythema. No exudate. Uvula midline. Patent airway.  LUNGS: no increased work of breathing. Clear lung sounds bilaterally. No wheezing, rhonchi, or rales.   CV: regular rate and rhythm. No clicks, murmurs, or rubs.    Results for orders placed or performed in visit on 05/09/24   Streptococcus A Rapid Screen w/Reflex to PCR - Clinic Collect     Status: Normal    Specimen: Throat; Swab   Result Value Ref Range    Group A Strep antigen Negative Negative   Influenza A & B Antigen - Clinic Collect     Status: Normal    Specimen: Nose; Swab   Result Value Ref Range    Influenza A antigen Negative Negative    Influenza B antigen Negative Negative    Narrative    Test results must be correlated with clinical data. If necessary, results should be confirmed by a molecular assay or viral culture.     "

## 2024-05-10 LAB — SARS-COV-2 RNA RESP QL NAA+PROBE: NEGATIVE

## 2024-06-10 SDOH — HEALTH STABILITY: PHYSICAL HEALTH
ON AVERAGE, HOW MANY DAYS PER WEEK DO YOU ENGAGE IN MODERATE TO STRENUOUS EXERCISE (LIKE A BRISK WALK)?: PATIENT DECLINED

## 2024-06-10 SDOH — HEALTH STABILITY: PHYSICAL HEALTH: ON AVERAGE, HOW MANY MINUTES DO YOU ENGAGE IN EXERCISE AT THIS LEVEL?: PATIENT DECLINED

## 2024-06-10 ASSESSMENT — PATIENT HEALTH QUESTIONNAIRE - PHQ9
10. IF YOU CHECKED OFF ANY PROBLEMS, HOW DIFFICULT HAVE THESE PROBLEMS MADE IT FOR YOU TO DO YOUR WORK, TAKE CARE OF THINGS AT HOME, OR GET ALONG WITH OTHER PEOPLE: SOMEWHAT DIFFICULT
SUM OF ALL RESPONSES TO PHQ QUESTIONS 1-9: 13
SUM OF ALL RESPONSES TO PHQ QUESTIONS 1-9: 13

## 2024-06-10 ASSESSMENT — ANXIETY QUESTIONNAIRES
4. TROUBLE RELAXING: MORE THAN HALF THE DAYS
3. WORRYING TOO MUCH ABOUT DIFFERENT THINGS: MORE THAN HALF THE DAYS
GAD7 TOTAL SCORE: 10
8. IF YOU CHECKED OFF ANY PROBLEMS, HOW DIFFICULT HAVE THESE MADE IT FOR YOU TO DO YOUR WORK, TAKE CARE OF THINGS AT HOME, OR GET ALONG WITH OTHER PEOPLE?: SOMEWHAT DIFFICULT
5. BEING SO RESTLESS THAT IT IS HARD TO SIT STILL: SEVERAL DAYS
7. FEELING AFRAID AS IF SOMETHING AWFUL MIGHT HAPPEN: NOT AT ALL
IF YOU CHECKED OFF ANY PROBLEMS ON THIS QUESTIONNAIRE, HOW DIFFICULT HAVE THESE PROBLEMS MADE IT FOR YOU TO DO YOUR WORK, TAKE CARE OF THINGS AT HOME, OR GET ALONG WITH OTHER PEOPLE: SOMEWHAT DIFFICULT
1. FEELING NERVOUS, ANXIOUS, OR ON EDGE: MORE THAN HALF THE DAYS
7. FEELING AFRAID AS IF SOMETHING AWFUL MIGHT HAPPEN: NOT AT ALL
2. NOT BEING ABLE TO STOP OR CONTROL WORRYING: SEVERAL DAYS
GAD7 TOTAL SCORE: 10
GAD7 TOTAL SCORE: 10
6. BECOMING EASILY ANNOYED OR IRRITABLE: MORE THAN HALF THE DAYS

## 2024-06-10 ASSESSMENT — SOCIAL DETERMINANTS OF HEALTH (SDOH): HOW OFTEN DO YOU GET TOGETHER WITH FRIENDS OR RELATIVES?: ONCE A WEEK

## 2024-06-13 ENCOUNTER — OFFICE VISIT (OUTPATIENT)
Dept: FAMILY MEDICINE | Facility: CLINIC | Age: 24
End: 2024-06-13
Payer: COMMERCIAL

## 2024-06-13 VITALS
TEMPERATURE: 97.3 F | WEIGHT: 148.7 LBS | BODY MASS INDEX: 21.29 KG/M2 | DIASTOLIC BLOOD PRESSURE: 73 MMHG | RESPIRATION RATE: 16 BRPM | HEIGHT: 70 IN | OXYGEN SATURATION: 99 % | SYSTOLIC BLOOD PRESSURE: 112 MMHG | HEART RATE: 87 BPM

## 2024-06-13 DIAGNOSIS — F60.3 BORDERLINE PERSONALITY DISORDER (H): ICD-10-CM

## 2024-06-13 DIAGNOSIS — G43.909 MIGRAINE WITHOUT STATUS MIGRAINOSUS, NOT INTRACTABLE, UNSPECIFIED MIGRAINE TYPE: ICD-10-CM

## 2024-06-13 DIAGNOSIS — Z00.00 ROUTINE GENERAL MEDICAL EXAMINATION AT A HEALTH CARE FACILITY: Primary | ICD-10-CM

## 2024-06-13 DIAGNOSIS — F41.1 GENERALIZED ANXIETY DISORDER: ICD-10-CM

## 2024-06-13 DIAGNOSIS — N92.1 MENORRHAGIA WITH IRREGULAR CYCLE: ICD-10-CM

## 2024-06-13 PROCEDURE — 90471 IMMUNIZATION ADMIN: CPT | Performed by: FAMILY MEDICINE

## 2024-06-13 PROCEDURE — 99395 PREV VISIT EST AGE 18-39: CPT | Mod: 25 | Performed by: FAMILY MEDICINE

## 2024-06-13 PROCEDURE — 99214 OFFICE O/P EST MOD 30 MIN: CPT | Mod: 25 | Performed by: FAMILY MEDICINE

## 2024-06-13 PROCEDURE — 90651 9VHPV VACCINE 2/3 DOSE IM: CPT | Performed by: FAMILY MEDICINE

## 2024-06-13 RX ORDER — PROCHLORPERAZINE MALEATE 10 MG
10 TABLET ORAL EVERY 6 HOURS PRN
Qty: 30 TABLET | Refills: 4 | Status: SHIPPED | OUTPATIENT
Start: 2024-06-13

## 2024-06-13 RX ORDER — SUMATRIPTAN 50 MG/1
50-100 TABLET, FILM COATED ORAL
Qty: 18 TABLET | Refills: 11 | Status: SHIPPED | OUTPATIENT
Start: 2024-06-13

## 2024-06-13 RX ORDER — PROPRANOLOL HYDROCHLORIDE 40 MG/1
40 TABLET ORAL 3 TIMES DAILY
Qty: 90 TABLET | Refills: 0 | Status: SHIPPED | OUTPATIENT
Start: 2024-06-13

## 2024-06-13 RX ORDER — CITALOPRAM HYDROBROMIDE 40 MG/1
40 TABLET ORAL DAILY
Qty: 90 TABLET | Refills: 3 | Status: SHIPPED | OUTPATIENT
Start: 2024-06-13

## 2024-06-13 RX ORDER — ACETAMINOPHEN AND CODEINE PHOSPHATE 120; 12 MG/5ML; MG/5ML
0.35 SOLUTION ORAL DAILY
Qty: 90 TABLET | Refills: 3 | Status: SHIPPED | OUTPATIENT
Start: 2024-06-13

## 2024-06-13 ASSESSMENT — PAIN SCALES - GENERAL: PAINLEVEL: NO PAIN (0)

## 2024-06-13 NOTE — PATIENT INSTRUCTIONS
"Try a low FODMAP diet:  F Fermentable   O Oligosaccharides Fructans, galacto-oligosaccharides Wheat, barley, rye, onion, carmen, white part of spring onion, garlic, shallots, artichokes, beetroot, fennel, peas, chicory, pistachio, cashews, legumes, lentils, and chickpeas   D Disaccharides Lactose Milk, custard, ice cream, and yogurt   M Monosaccharides \"Free fructose\" (fructose in excess of glucose) Apples, pears, mangoes, cherries, watermelon, asparagus, sugar snap peas, honey, high-fructose corn syrup   A And   P Polyols Sorbitol, mannitol, maltitol, and xylitol Apples, pears, apricots, cherries, nectarines, peaches, plums, watermelon, mushrooms, cauliflower, artificially sweetened chewing gum and confectionery         Options for period control:    See Dr. White or the [facility] to talk about Nexaplanon - we can try progesterone only pills first  Try the daily progesterone tablet MIcronor      I think counseling would be very helpful for you.  The best way to find out if a counselor is covered under your insurance is to call the mental health line on the back of your card and see where they cover.  Here are some other good people or clinics that I've worked with:    There is the [facility], which is free,  It's run by volunteers - both people who are fully licensed and people who are still under supervision.    [email or web address]  [facility] [place] [address]  [phone]    [email or web address] - this is a search engine to look for bios on therapists    [facility]    [facility]  [phone]  Daron Lowe  Love both sides consulting POC focus    [place] [facility] Mango[employer]ate.Apaja   and POC talk therapy  Several sites  [phone]    Janelle Schofield:  Roe Teran:  Teens  [facility]      Homa SWAN " "consulting      Tiarra Garcia  Transracial and international adoption    Bibi Bonner:  POC interest    [facility] -  therapist group    Dimple  African American therapist      Centered  Care:  [facility]    Dr. Bharat Yeager  \"Chemistry of Laisha\"  [facility]  Does not take insurance    Nella Gaines:  [phone] - no insurance  [phone]    Janelle Mendoza -- Art of Counseling:  [facility]  Takes insurance  Sees teens    Julissa Teo:  Psych recovery  [address]  [address]  [place], [place]  [phone]      Santa Angulo PhD, LP  Licensed Psychologist  [phone]  [email or web address]    Luz Maria Ordonez MA  [facility]  [address]  [place] [address] [phone]    [facility]  [phone]  Guillermina Dixon or others    [facility] for Personal [facility] Family Development  [address]  [facility]   Marques Hohenwald   [phone] (answered 24 hours)      Priscilla Arriaga  Specializes in Eating Disorders  [address]  [phone]    Cheryl Edwards   EMDR provider      Mark Davila  [facility]  [address] [place] [phone]    [facility]  Alexus Mcdonough LP OR [facility])  [phone]  They are very good and have therapists who focus on grief, anxiety and adjustment    Ada Kelly M.A., LP  Specialties:  Adjustment Problems/Stress, Anxiety, Death and Dying, Depression, Grief and Loss, Physical/Chronic Health Issues, Posttraumatic Stress Disorder, Self Esteem, Trauma, Work Issues  Services:  Couples Psychotherapy, EMDR, Group Therapy, Individual psychotherapy  Ages Served:  Adolescent (age 13-17), Adult (age 18-64), Elder (age 65+)    Private Practice  [address] [address]  [place] for Grief  [address] [phone]    Marilin CHRIS  Individual and " "Group Psychotherapy  Dialectic Behavior Therapy  Teens, Adults  [place] [facility]  [phone]  [email or web address]  [employer]  [facility]    Family Partnership  Homa [phone]    Valerie  [phone]  [phone]  Dr. Jesi Holland  Therapist  [facility]  [phone]    Ada Ronquillo: [phone]    Camila Holguin: [phone]    Rachelsilvia Orrwendy:  [phone]    [facility]:  [phone]    Dr. Deya Aaron  Child psychologist (0-8+)  [phone]  [address] [place] [address]    Rolando Gaffney Belt  [phone]  [facility] Psych[facility]     [facility]/Eating disorders  Lelo Espinal  [phone]  [email or web address]  General number  [phone] [facility]    Mental health emergency:    [facility] mobile crisis teams:  Adults - COPE [phone]    CHildren 17 and under - [phone]      Patient Education   Preventive Care Advice   This is general advice we often give to help people stay healthy. Your care team may have specific advice just for you. Please talk to your care team about your own preventive care needs.  Lifestyle  Exercise at least 150 minutes each week (30 minutes a day, 5 days a week).  Do muscle strengthening activities 2 days a week. These help control your weight and prevent disease.  No smoking.  Wear sunscreen to prevent skin cancer.  Have your home tested for radon every 2 to 5 years. Radon is a colorless, odorless gas that can harm your lungs. To learn more, go to www.health.[email or web address].[place].us and search for \"Radon in Homes.\"  Keep guns unloaded and locked up in a safe place like a safe or gun vault, or, use a gun lock and hide the keys. Always lock away bullets separately. To learn more, visit dps.[place].gov and search for \"safe gun storage.\"  Nutrition  Eat 5 or more servings of fruits and vegetables each day.  Try wheat bread, brown rice and whole grain pasta (instead of white bread, rice, " and pasta).  Get enough calcium and vitamin D. Check the label on foods and aim for 100% of the RDA (recommended daily allowance).  Regular exams  Have a dental exam and cleaning every 6 months.  See your health care team every year to talk about:  Any changes in your health.  Any medicines your care team has prescribed.  Preventive care, family planning, and ways to prevent chronic diseases.  Shots (vaccines)   HPV shots (up to age 26), if you've never had them before.  Hepatitis B shots (up to age 59), if you've never had them before.  COVID-19 shot: Get this shot when it's due.  Flu shot: Get a flu shot every year.  Tetanus shot: Get a tetanus shot every 10 years.  Pneumococcal, hepatitis A, and RSV shots: Ask your care team if you need these based on your risk.  Shingles shot (for age 50 and up).  General health tests  Diabetes screening:  Starting at age 35, Get screened for diabetes at least every 3 years.  If you are younger than age 35, ask your care team if you should be screened for diabetes.  Cholesterol test: At age 39, start having a cholesterol test every 5 years, or more often if advised.  Bone density scan (DEXA): At age 50, ask your care team if you should have this scan for osteoporosis (brittle bones).  Hepatitis C: Get tested at least once in your life.  Abdominal aortic aneurysm screening: Talk to your doctor about having this screening if you:  Have ever smoked; and  Are biologically male; and  Are between the ages of 65 and 75.  STIs (sexually transmitted infections)  Before age 24: Ask your care team if you should be screened for STIs.  After age 24: Get screened for STIs if you're at risk. You are at risk for STIs (including HIV) if:  You are sexually active with more than one person.  You don't use condoms every time.  You or a partner was diagnosed with a sexually transmitted infection.  If you are at risk for HIV, ask about PrEP medicine to prevent HIV.  Get tested for HIV at least once  in your life, whether you are at risk for HIV or not.  Cancer screening tests  Cervical cancer screening: If you have a cervix, begin getting regular cervical cancer screening tests at age 21. Most people who have regular screenings with normal results can stop after age 65. Talk about this with your provider.  Breast cancer scan (mammogram): If you've ever had breasts, begin having regular mammograms starting at age 40. This is a scan to check for breast cancer.  Colon cancer screening: It is important to start screening for colon cancer at age 45.  Have a colonoscopy test every 10 years (or more often if you're at risk) Or, ask your provider about stool tests like a FIT test every year or Cologuard test every 3 years.  To learn more about your testing options, visit: www.agencyQ/869940.pdf.  For help making a decision, visit: david/cp68071.  Prostate cancer screening test: If you have a prostate and are age 55 to 69, ask your provider if you would benefit from a yearly prostate cancer screening test.  Lung cancer screening: If you are a current or former smoker age 50 to 80, ask your care team if ongoing lung cancer screenings are right for you.  For informational purposes only. Not to replace the advice of your health care provider. Copyright   2023 [facility]. All rights reserved. Clinically reviewed by the [facility] Transitions Program. [employer] [id number] - REV 04/24.  Learning About Stress  What is stress?     Stress is your body's response to a hard situation. Your body can have a physical, emotional, or mental response. Stress is a fact of life for most people, and it affects everyone differently. What causes stress for you may not be stressful for someone else.  A lot of things can cause stress. You may feel stress when you go on a job interview, take a test, or run a race. This kind of short-term stress is normal and even useful. It can help you if you need to work hard or react  quickly. For example, stress can help you finish an important job on time.  Long-term stress is caused by ongoing stressful situations or events. Examples of long-term stress include long-term health problems, ongoing problems at work, or conflicts in your family. Long-term stress can harm your health.  How does stress affect your health?  When you are stressed, your body responds as though you are in danger. It makes hormones that speed up your heart, make you breathe faster, and give you a burst of energy. This is called the fight-or-flight stress response. If the stress is over quickly, your body goes back to normal and no harm is done.  But if stress happens too often or lasts too long, it can have bad effects. Long-term stress can make you more likely to get sick, and it can make symptoms of some diseases worse. If you tense up when you are stressed, you may develop neck, shoulder, or low back pain. Stress is linked to high blood pressure and heart disease.  Stress also harms your emotional health. It can make you bird, tense, or depressed. Your relationships may suffer, and you may not do well at work or school.  What can you do to manage stress?  You can try these things to help manage stress:   Do something active. Exercise or activity can help reduce stress. Walking is a great way to get started. Even everyday activities such as housecleaning or yard work can help.  Try yoga or butch chi. These techniques combine exercise and meditation. You may need some training at first to learn them.  Do something you enjoy. For example, listen to music or go to a movie. Practice your hobby or do volunteer work.  Meditate. This can help you relax, because you are not worrying about what happened before or what may happen in the future.  Do guided imagery. Imagine yourself in any setting that helps you feel calm. You can use online videos, books, or a teacher to guide you.  Do breathing exercises. For example:  From a  "standing position, bend forward from the waist with your knees slightly bent. Let your arms dangle close to the floor.  Breathe in slowly and deeply as you return to a standing position. Roll up slowly and lift your head last.  Hold your breath for just a few seconds in the standing position.  Breathe out slowly and bend forward from the waist.  Let your feelings out. Talk, laugh, cry, and express anger when you need to. Talking with supportive friends or family, a counselor, or a adriana leader about your feelings is a healthy way to relieve stress. Avoid discussing your feelings with people who make you feel worse.  Write. It may help to write about things that are bothering you. This helps you find out how much stress you feel and what is causing it. When you know this, you can find better ways to cope.  What can you do to prevent stress?  You might try some of these things to help prevent stress:  Manage your time. This helps you find time to do the things you want and need to do.  Get enough sleep. Your body recovers from the stresses of the day while you are sleeping.  Get support. Your family, friends, and community can make a difference in how you experience stress.  Limit your news feed. Avoid or limit time on social media or news that may make you feel stressed.  Do something active. Exercise or activity can help reduce stress. Walking is a great way to get started.  Where can you learn more?  Go to https://www.BView.net/patiented  Enter N032 in the search box to learn more about \"Learning About Stress.\"  Current as of: October 24, 2023               Content Version: 14.0    3430-8598 [employer].   Care instructions adapted under license by your healthcare professional. If you have questions about a medical condition or this instruction, always ask your healthcare professional. [employer] disclaims any warranty or liability for your use of this information.      Learning " About Depression Screening  What is depression screening?  Depression screening is a way to see if you have depression symptoms. It may be done by a doctor or counselor. It's often part of a routine checkup. That's because your mental health is just as important as your physical health.  Depression is a mental health condition that affects how you feel, think, and act. You may:  Have less energy.  Lose interest in your daily activities.  Feel sad and grouchy for a long time.  Depression is very common. It affects people of all ages.  Many things can lead to depression. Some people become depressed after they have a stroke or find out they have a major illness like cancer or heart disease. The death of a loved one or a breakup may lead to depression. It can run in families. Most experts believe that a combination of inherited genes and stressful life events can cause it.  What happens during screening?  You may be asked to fill out a form about your depression symptoms. You and the doctor will discuss your answers. The doctor may ask you more questions to learn more about how you think, act, and feel.  What happens after screening?  If you have symptoms of depression, your doctor will talk to you about your options.  Doctors usually treat depression with medicines or counseling. Often, combining the two works best. Many people don't get help because they think that they'll get over the depression on their own. But people with depression may not get better unless they get treatment.  The cause of depression is not well understood. There may be many factors involved. But if you have depression, it's not your fault.  A serious symptom of depression is thinking about death or suicide. If you or someone you care about talks about this or about feeling hopeless, get help right away.  It's important to know that depression can be treated. Medicine, counseling, and self-care may help.  Where can you learn more?  Go to  "https://www.LightSquared.net/patiented  Enter T185 in the search box to learn more about \"Learning About Depression Screening.\"  Current as of: June 24, 2023               Content Version: 14.0    2616-5022 [employer].   Care instructions adapted under license by your healthcare professional. If you have questions about a medical condition or this instruction, always ask your healthcare professional. [employer] disclaims any warranty or liability for your use of this information.      Substance Use Disorder: Care Instructions  Overview     You can improve your life and health by stopping your use of alcohol or drugs. When you don't drink or use drugs, you may feel and sleep better. You may get along better with your family, friends, and coworkers. There are medicines and programs that can help with substance use disorder.  How can you care for yourself at home?  Here are some ways to help you stay sober and prevent relapse.  If you have been given medicine to help keep you sober or reduce your cravings, be sure to take it exactly as prescribed.  Talk to your doctor about programs that can help you stop using drugs or drinking alcohol.  Do not keep alcohol or drugs in your home.  Plan ahead. Think about what you'll say if other people ask you to drink or use drugs. Try not to spend time with people who drink or use drugs.  Use the time and money spent on drinking or drugs to do something that's important to you.  Preventing a relapse  Have a plan to deal with relapse. Learn to recognize changes in your thinking that lead you to drink or use drugs. Get help before you start to drink or use drugs again.  Try to stay away from situations, friends, or places that may lead you to drink or use drugs.  If you feel the need to drink alcohol or use drugs again, seek help right away. Call a trusted friend or family member. Some people get support from organizations such as Narcotics Anonymous or SMART " Recovery or from treatment facilities.  If you relapse, get help as soon as you can. Some people make a plan with another person that outlines what they want that person to do for them if they relapse. The plan usually includes how to handle the relapse and who to notify in case of relapse.  Don't give up. Remember that a relapse doesn't mean that you have failed. Use the experience to learn the triggers that lead you to drink or use drugs. Then quit again. Recovery is a lifelong process. Many people have several relapses before they are able to quit for good.  Follow-up care is a key part of your treatment and safety. Be sure to make and go to all appointments, and call your doctor if you are having problems. It's also a good idea to know your test results and keep a list of the medicines you take.  When should you call for help?   Call 911  anytime you think you may need emergency care. For example, call if you or someone else:    Has overdosed or has withdrawal signs. Be sure to tell the emergency workers that you are or someone else is using or trying to quit using drugs. Overdose or withdrawal signs may include:  Losing consciousness.  Seizure.  Seeing or hearing things that aren't there (hallucinations).     Is thinking or talking about suicide or harming others.   Where to get help 24 hours a day, 7 days a week   If you or someone you know talks about suicide, self-harm, a mental health crisis, a substance use crisis, or any other kind of emotional distress, get help right away. You can:    Call the Suicide and Crisis Lifeline at 988.     Call 5-796-969-TALK (1-546.557.8170).     Text HOME to 166916 to access the Crisis Text Line.   Consider saving these numbers in your phone.  Go to Puddle.Calester for more information or to chat online.  Call your doctor now or seek immediate medical care if:    You are having withdrawal symptoms. These may include nausea or vomiting, sweating, shakiness, and anxiety.  "  Watch closely for changes in your health, and be sure to contact your doctor if:    You have a relapse.     You need more help or support to stop.   Where can you learn more?  Go to https://www.NantHealth.net/patiented  Enter H573 in the search box to learn more about \"Substance Use Disorder: Care Instructions.\"  Current as of: November 15, 2023               Content Version: 14.0    4264-8331 [employer].   Care instructions adapted under license by your healthcare professional. If you have questions about a medical condition or this instruction, always ask your healthcare professional. [employer] disclaims any warranty or liability for your use of this information.         "

## 2024-06-13 NOTE — PROGRESS NOTES
Preventive Care Visit  [employer]  BricelynChandrika Walker MD, Family Medicine  Jun 13, 2024      Assessment & Plan     (Z00.00) Routine general medical examination at a health care facility  (primary encounter diagnosis)  Comment:   Plan:     (F60.3) Borderline personality disorder (H)  Comment: meds seem to be helping  Offered talk therapy options  They have no intrusive thoughts or plans and have good close support from family  Plan: citalopram (CELEXA) 40 MG tablet, Adult Mental         Health  Referral            (F41.1) Generalized anxiety disorder  Comment:   Plan: Adult Mental Health  Referral            (G43.909) Migraine without status migrainosus, not intractable, unspecified migraine type  Comment: referral to headache clinic can try CGRP agent and see if covered and effective in meantime  Additional rescue meds reviewed and offered    Plan: SUMAtriptan (IMITREX) 50 MG tablet,         prochlorperazine (COMPAZINE) 10 MG tablet,         Adult Neurology  Referral, propranolol        (INDERAL) 40 MG tablet        Should follow up with headache clinic to see hiw this is progressing    (N92.1) Menorrhagia with irregular cycle  Comment:   Plan: norethindrone (MICRONOR) 0.35 MG tablet        Discussed stopping cycles to avoid headaches    Patient has been advised of split billing requirements and indicates understanding: Yes        Nicotine/Tobacco Cessation  She reports that she has been smoking vaping device. She has never used smokeless tobacco.  Nicotine/Tobacco Cessation Plan  Not addressed today      Depression Screening Follow Up                  Follow Up Actions Taken  Has god support with her sister and friends      Discussed the following ways the patient can remain in a safe environment:  be around others  Counseling  Appropriate preventive services were discussed with this patient, including applicable screening as appropriate for fall prevention,  nutrition, physical activity, Tobacco-use cessation, weight loss and cognition.  Checklist reviewing preventive services available has been given to the patient.  Reviewed patient's diet, addressing concerns and/or questions.   The patient was instructed to see the dentist every 6 months.   The patient's PHQ-9 score is consistent with moderate depression. She was provided with information regarding depression.           Kiesha Bess is a 24 year old, presenting for the following:  Physical        6/13/2024    11:40 AM   Additional Questions   Roomed by [facility] Care Directive  Patient does not have a Health Care Directive or Living Will: Discussed advance care planning with patient; however, patient declined at this time.    HPI  Answers submitted by the patient for this visit:  Patient Health Questionnaire (Submitted on 6/10/2024)  If you checked off any problems, how difficult have these problems made it for you to do your work, take care of things at home, or get along with other people?: Somewhat difficult  PHQ9 TOTAL SCORE: 13  BRUCE-7 (Submitted on 6/10/2024)  BRUCE 7 TOTAL SCORE: 10  Headaches:  1-2 per month lasting a week each  'heat helps  Has pain behind eyes  Now uses glasses and this helps  Migraines feel worse - gets nausea              6/10/2024   General Health   How would you rate your overall physical health? (!) FAIR   Feel stress (tense, anxious, or unable to sleep) Rather much   (!) STRESS CONCERN      6/10/2024   Nutrition   Three or more servings of calcium each day? (!) NO   Diet: Regular (no restrictions)    Vegetarian/vegan   How many servings of fruit and vegetables per day? (!) 0-1   How many sweetened beverages each day? 0-1         6/10/2024   Exercise   Days per week of moderate/strenous exercise Patient declined   Average minutes spent exercising at this level Patient declined         6/10/2024   Social Factors   Frequency of gathering with friends or relatives Once a  week   Worry food won't last until get money to buy more No   Food not last or not have enough money for food? No   Do you have housing?  Yes   Are you worried about losing your housing? No   Lack of transportation? No   Unable to get utilities (heat,electricity)? No         6/10/2024   Dental   Dentist two times every year? (!) NO         6/10/2024   TB Screening   Were you born outside of the [place]? No       Today's PHQ-9 Score:       6/10/2024     8:25 PM   PHQ-9 SCORE   PHQ-9 Total Score MyChart 13 (Moderate depression)   PHQ-9 Total Score 13         6/10/2024   Substance Use   If I could quit smoking, I would Somewhat agree   I want to quit somking, worry about health affects Somewhat agree   Willing to make a plan to quit smoking Somewhat disagree   Willing to cut down before quitting Completely agree   Alcohol more than 3/day or more than 7/wk No   Do you use any other substances recreationally? (!) ALCOHOL    (!) CANNABIS PRODUCTS     Social History     Tobacco Use    Smoking status: Every Day     Types: Vaping Device    Smokeless tobacco: Never   Vaping Use    Vaping status: Every Day    Substances: Nicotine    Devices: Disposable, Pre-filled or refillable cartridge   Substance Use Topics    Alcohol use: Yes     Comment: rarely    Drug use: Yes     Types: Marijuana                  6/10/2024   STI Screening   New sexual partner(s) since last STI/HIV test? No     History of abnormal Pap smear: No - age 21-29 PAP every 3 years recommended        7/12/2021     9:50 AM   PAP / HPV   PAP Negative for Intraepithelial Lesion or Malignancy (NILM)            6/10/2024   Contraception/Family Planning   Questions about contraception or family planning (!) YES  has had STD screens        Reviewed and updated as needed this visit by Provider         Johnathon Alonzo            Past Medical History:   Diagnosis Date    Depressive disorder 2019         Review of Systems  Constitutional, HEENT, cardiovascular, pulmonary, gi and gu  "systems are negative, except as otherwise noted.     Objective    Exam  /73   Pulse 87   Temp 97.3  F (36.3  C) (Temporal)   Resp 16   Ht 1.778 m (5' 10\")   Wt 67.4 kg (148 lb 11.2 oz)   LMP 06/09/2024   SpO2 99%   Breastfeeding No   BMI 21.34 kg/m     Estimated body mass index is 21.34 kg/m  as calculated from the following:    Height as of this encounter: 1.778 m (5' 10\").    Weight as of this encounter: 67.4 kg (148 lb 11.2 oz).    Physical Exam  GENERAL: alert and no distress  EYES: Eyes grossly normal to inspection, PERRL and conjunctivae and sclerae normal  HENT: ear canals and TM's normal, nose and mouth without ulcers or lesions  NECK: no adenopathy, no asymmetry, masses, or scars  RESP: lungs clear to auscultation - no rales, rhonchi or wheezes  CV: regular rate and rhythm, normal S1 S2, no S3 or S4, no murmur, click or rub, no peripheral edema  ABDOMEN: soft, nontender, no hepatosplenomegaly, no masses and bowel sounds normal  MS: no gross musculoskeletal defects noted, no edema  SKIN: no suspicious lesions or rashes  NEURO: Normal strength and tone, mentation intact and speech normal  PSYCH: mentation appears normal, affect normal/bright        Signed Electronically by: Kenzie Walker MD    "

## 2024-06-14 ENCOUNTER — TELEPHONE (OUTPATIENT)
Dept: FAMILY MEDICINE | Facility: CLINIC | Age: 24
End: 2024-06-14
Payer: COMMERCIAL

## 2024-06-14 NOTE — TELEPHONE ENCOUNTER
Dear Dr. Walker,    Thank you for your referral to the [facility]. At this time our clinic does not see patients to provide differential diagnoses of (mood disorders, psychiatric disorders) such as (anxiety, BPD, depression, bipolar, OCD) or learning disabilities. Questions of this nature are more appropriately assessed through psychological testing. Please refer to psychiatry for this type of assessment. I've included their phone number below.     [facility] - with locations throughout the Health system area: [phone].    Sincerely,  Jenny Ruelas  Psychometrist

## 2024-06-15 PROBLEM — G43.909 MIGRAINE WITHOUT STATUS MIGRAINOSUS, NOT INTRACTABLE, UNSPECIFIED MIGRAINE TYPE: Status: ACTIVE | Noted: 2024-06-15

## 2024-06-15 PROBLEM — F41.1 GENERALIZED ANXIETY DISORDER: Status: ACTIVE | Noted: 2024-06-15

## 2024-06-21 ENCOUNTER — OFFICE VISIT (OUTPATIENT)
Dept: URGENT CARE | Facility: URGENT CARE | Age: 24
End: 2024-06-21
Payer: COMMERCIAL

## 2024-06-21 VITALS
DIASTOLIC BLOOD PRESSURE: 70 MMHG | SYSTOLIC BLOOD PRESSURE: 104 MMHG | BODY MASS INDEX: 21.45 KG/M2 | OXYGEN SATURATION: 99 % | HEART RATE: 66 BPM | WEIGHT: 149.5 LBS | TEMPERATURE: 98.4 F

## 2024-06-21 DIAGNOSIS — J06.9 VIRAL UPPER RESPIRATORY TRACT INFECTION: ICD-10-CM

## 2024-06-21 DIAGNOSIS — R09.81 CONGESTION OF PARANASAL SINUS: Primary | ICD-10-CM

## 2024-06-21 LAB
BASOPHILS # BLD AUTO: 0 10E3/UL (ref 0–0.2)
BASOPHILS NFR BLD AUTO: 1 %
EOSINOPHIL # BLD AUTO: 0.2 10E3/UL (ref 0–0.7)
EOSINOPHIL NFR BLD AUTO: 2 %
ERYTHROCYTE [DISTWIDTH] IN BLOOD BY AUTOMATED COUNT: 12.9 % (ref 10–15)
FLUAV AG SPEC QL IA: NEGATIVE
FLUBV AG SPEC QL IA: NEGATIVE
HCT VFR BLD AUTO: 40.5 % (ref 35–47)
HGB BLD-MCNC: 12.8 G/DL (ref 11.7–15.7)
IMM GRANULOCYTES # BLD: 0 10E3/UL
IMM GRANULOCYTES NFR BLD: 0 %
LYMPHOCYTES # BLD AUTO: 1.1 10E3/UL (ref 0.8–5.3)
LYMPHOCYTES NFR BLD AUTO: 13 %
MCH RBC QN AUTO: 27.5 PG (ref 26.5–33)
MCHC RBC AUTO-ENTMCNC: 31.6 G/DL (ref 31.5–36.5)
MCV RBC AUTO: 87 FL (ref 78–100)
MONOCYTES # BLD AUTO: 0.9 10E3/UL (ref 0–1.3)
MONOCYTES NFR BLD AUTO: 11 %
NEUTROPHILS # BLD AUTO: 5.9 10E3/UL (ref 1.6–8.3)
NEUTROPHILS NFR BLD AUTO: 74 %
PLATELET # BLD AUTO: 313 10E3/UL (ref 150–450)
RBC # BLD AUTO: 4.66 10E6/UL (ref 3.8–5.2)
WBC # BLD AUTO: 8.1 10E3/UL (ref 4–11)

## 2024-06-21 PROCEDURE — 87804 INFLUENZA ASSAY W/OPTIC: CPT | Performed by: NURSE PRACTITIONER

## 2024-06-21 PROCEDURE — 87635 SARS-COV-2 COVID-19 AMP PRB: CPT | Performed by: NURSE PRACTITIONER

## 2024-06-21 PROCEDURE — 85025 COMPLETE CBC W/AUTO DIFF WBC: CPT | Performed by: NURSE PRACTITIONER

## 2024-06-21 PROCEDURE — 99214 OFFICE O/P EST MOD 30 MIN: CPT | Performed by: NURSE PRACTITIONER

## 2024-06-21 PROCEDURE — 36415 COLL VENOUS BLD VENIPUNCTURE: CPT | Performed by: NURSE PRACTITIONER

## 2024-06-21 NOTE — LETTER
June 21, 2024      Maria Alejandra Betts  [address] [address]        To Whom It May Concern:    Maria Alejandra Betts was seen in our clinic. Please excuse medical related absences. May return to work Monday if fever free and improved.      Sincerely,        Anita Smyth, NP

## 2024-06-21 NOTE — PROGRESS NOTES
Chief Complaint   Patient presents with    Sinus Problem     Sinus congestion since yesterday     Pain     Body aches      SUBJECTIVE:  Maria Alejandra Betts is a 24 year old female presenting with sinus pain postnasal drip cough congestion pressure headaches myalgias since yesterday.  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  She does vape.  Declines chest pain hemoptysis fevers or asthma.    Past Medical History:   Diagnosis Date    Depressive disorder 2019     Current Outpatient Medications   Medication Sig Dispense Refill    citalopram (CELEXA) 40 MG tablet Take 1 tablet (40 mg) by mouth daily Start with half tablet daily for 2-3 weeks then whole tablet 90 tablet 3    norethindrone (MICRONOR) 0.35 MG tablet Take 1 tablet (0.35 mg) by mouth daily 90 tablet 3    prochlorperazine (COMPAZINE) 10 MG tablet Take 1 tablet (10 mg) by mouth every 6 hours as needed for nausea or vomiting (and persistent migraine) 30 tablet 4    propranolol (INDERAL) 10 MG tablet Take 1 tablet (10 mg) by mouth 2 times daily as needed (anxiety) (Patient not taking: Reported on 5/9/2024) 10 tablet 0    propranolol (INDERAL) 40 MG tablet Take 1 tablet (40 mg) by mouth 3 times daily Then we can try 60mg extended release 90 tablet 0    SUMAtriptan (IMITREX) 50 MG tablet Take 1-2 tablets ( mg) by mouth at onset of headache for migraine May repeat in 2 hours. Max 4 tablets/24 hours. 18 tablet 11     No current facility-administered medications for this visit.     Social History     Tobacco Use    Smoking status: Every Day     Types: Vaping Device    Smokeless tobacco: Never   Substance Use Topics    Alcohol use: Yes     Comment: rarely     Allergies   Allergen Reactions    Seasonal Allergies        Review of Systems  All systems negative except for those listed above in HPI.    OBJECTIVE:   /70 (BP Location: Right arm, Patient Position: Sitting, Cuff Size: Adult Regular)   Pulse 66   Temp 98.4  F (36.9  C)  (Oral)   Wt 67.8 kg (149 lb 8 oz)   LMP 06/09/2024   SpO2 99%   BMI 21.45 kg/m    Physical Exam  Vitals reviewed.   Constitutional:       General: She is not in acute distress.     Appearance: Normal appearance. She is well-developed. She is not ill-appearing, toxic-appearing or diaphoretic.   HENT:      Head: Normocephalic and atraumatic.      Right Ear: Tympanic membrane and ear canal normal.      Left Ear: Tympanic membrane and ear canal normal.      Nose: Nose normal.      Mouth/Throat:      Pharynx: No oropharyngeal exudate or posterior oropharyngeal erythema.   Eyes:      Conjunctiva/sclera: Conjunctivae normal.      Pupils: Pupils are equal, round, and reactive to light.   Cardiovascular:      Rate and Rhythm: Normal rate.      Pulses: Normal pulses.   Pulmonary:      Effort: Pulmonary effort is normal. No respiratory distress.      Breath sounds: No stridor. No wheezing, rhonchi or rales.   Chest:      Chest wall: No tenderness.   Musculoskeletal:         General: Normal range of motion.      Cervical back: Normal range of motion and neck supple.   Lymphadenopathy:      Cervical: No cervical adenopathy.   Skin:     General: Skin is warm.      Capillary Refill: Capillary refill takes less than 2 seconds.      Findings: No rash.   Neurological:      General: No focal deficit present.      Mental Status: She is alert and oriented to person, place, and time.   Psychiatric:         Mood and Affect: Mood normal.         Behavior: Behavior normal.       Results for orders placed or performed in visit on 06/21/24   CBC with platelets and differential     Status: None   Result Value Ref Range    WBC Count 8.1 4.0 - 11.0 10e3/uL    RBC Count 4.66 3.80 - 5.20 10e6/uL    Hemoglobin 12.8 11.7 - 15.7 g/dL    Hematocrit 40.5 35.0 - 47.0 %    MCV 87 78 - 100 fL    MCH 27.5 26.5 - 33.0 pg    MCHC 31.6 31.5 - 36.5 g/dL    RDW 12.9 10.0 - 15.0 %    Platelet Count 313 150 - 450 10e3/uL    % Neutrophils 74 %    % Lymphocytes  13 %    % Monocytes 11 %    % Eosinophils 2 %    % Basophils 1 %    % Immature Granulocytes 0 %    Absolute Neutrophils 5.9 1.6 - 8.3 10e3/uL    Absolute Lymphocytes 1.1 0.8 - 5.3 10e3/uL    Absolute Monocytes 0.9 0.0 - 1.3 10e3/uL    Absolute Eosinophils 0.2 0.0 - 0.7 10e3/uL    Absolute Basophils 0.0 0.0 - 0.2 10e3/uL    Absolute Immature Granulocytes 0.0 <=0.4 10e3/uL   Influenza A & B Antigen - Clinic Collect     Status: Normal    Specimen: Nose; Swab   Result Value Ref Range    Influenza A antigen Negative Negative    Influenza B antigen Negative Negative    Narrative    Test results must be correlated with clinical data. If necessary, results should be confirmed by a molecular assay or viral culture.   CBC with platelets and differential     Status: None    Narrative    The following orders were created for panel order CBC with platelets and differential.  Procedure                               Abnormality         Status                     ---------                               -----------         ------                     CBC with platelets and d...[[id number]]                      Final result                 Please view results for these tests on the individual orders.     ASSESSMENT:    ICD-10-CM    1. Congestion of paranasal sinus  R09.81 CBC with platelets and differential     Symptomatic COVID-19 Virus (Coronavirus) by PCR Nasopharyngeal     Influenza A & B Antigen - Clinic Collect     CBC with platelets and differential      2. Viral upper respiratory tract infection  J06.9         PLAN:     Your symptoms appear to be viral at this time.  Secondary bacterial infections only happen in about 0.5-2% of cases.  Antibiotics are recommended only if you do not have any relief from your symptoms in 10 days or symptoms worsen after 7 days.  Nasal congestion often starts clear then turns yellow or green towards the end- this is not a sign of a bacterial infection.  Flonase (fluticasone) 2 sprays in each  nostril daily until symptoms resolve, then continue 1 spray in each nostril for at least 5 more days.  Take Tylenol or an NSAID such as ibuprofen or naproxen as needed for pain.  May use netti pot with bottled or distilled water and saline packets to flush sinuses.  Sudafed (pseudoephedrine) behind the pharmacist counter for 3-5 days helps relieve congestion.  Afrin (oxymetazoline) nasal spray twice daily for 3 days. Stop after 3 days.  Mucinex (guiafenesin) thins mucus and may help it to loosen more quickly  Saline drops or nasal sprays may loosen mucus.  Sit in the bathroom with the door closed and hot shower running to loosen mucus.  Contact primary care clinic if you do not have any relief from your symptoms after 10 days.  Present to emergency room for significantly increasing pain, persistent high fever >102F, swelling/redness around your eyes, changes in your vision or ability to move your eyes, altered mental status or a severe headache.    Follow up with primary care provider with any problems, questions or concerns or if symptoms worsen or fail to improve. Patient agreed to plan and verbalized understanding.    Anita Smyth, [facility] [place]

## 2024-06-22 NOTE — PATIENT INSTRUCTIONS
Your symptoms appear to be viral at this time.  Secondary bacterial infections only happen in about 0.5-2% of cases.  Antibiotics are recommended only if you do not have any relief from your symptoms in 10 days or symptoms worsen after 7 days.  Nasal congestion often starts clear then turns yellow or green towards the end- this is not a sign of a bacterial infection.  Flonase (fluticasone) 2 sprays in each nostril daily until symptoms resolve, then continue 1 spray in each nostril for at least 5 more days.  Take Tylenol or an NSAID such as ibuprofen or naproxen as needed for pain.  May use netti pot with bottled or distilled water and saline packets to flush sinuses.  Sudafed (pseudoephedrine) behind the pharmacist counter for 3-5 days helps relieve congestion.  Afrin (oxymetazoline) nasal spray twice daily for 3 days. Stop after 3 days.  Mucinex (guiafenesin) thins mucus and may help it to loosen more quickly  Saline drops or nasal sprays may loosen mucus.  Sit in the bathroom with the door closed and hot shower running to loosen mucus.  Contact primary care clinic if you do not have any relief from your symptoms after 10 days.  Present to emergency room for significantly increasing pain, persistent high fever >102F, swelling/redness around your eyes, changes in your vision or ability to move your eyes, altered mental status or a severe headache.

## 2024-06-23 LAB — SARS-COV-2 RNA RESP QL NAA+PROBE: NEGATIVE

## 2024-06-27 ENCOUNTER — OFFICE VISIT (OUTPATIENT)
Dept: URGENT CARE | Facility: URGENT CARE | Age: 24
End: 2024-06-27
Payer: COMMERCIAL

## 2024-06-27 VITALS
BODY MASS INDEX: 21.81 KG/M2 | TEMPERATURE: 98 F | SYSTOLIC BLOOD PRESSURE: 118 MMHG | WEIGHT: 152 LBS | RESPIRATION RATE: 16 BRPM | HEART RATE: 58 BPM | OXYGEN SATURATION: 100 % | DIASTOLIC BLOOD PRESSURE: 79 MMHG

## 2024-06-27 DIAGNOSIS — J06.9 UPPER RESPIRATORY TRACT INFECTION, UNSPECIFIED TYPE: Primary | ICD-10-CM

## 2024-06-27 LAB — DEPRECATED S PYO AG THROAT QL EIA: NEGATIVE

## 2024-06-27 PROCEDURE — 87651 STREP A DNA AMP PROBE: CPT | Performed by: FAMILY MEDICINE

## 2024-06-27 PROCEDURE — 87635 SARS-COV-2 COVID-19 AMP PRB: CPT | Performed by: FAMILY MEDICINE

## 2024-06-27 PROCEDURE — 99213 OFFICE O/P EST LOW 20 MIN: CPT | Performed by: FAMILY MEDICINE

## 2024-06-27 RX ORDER — BENZONATATE 100 MG/1
100 CAPSULE ORAL 3 TIMES DAILY PRN
Qty: 21 CAPSULE | Refills: 1 | Status: SHIPPED | OUTPATIENT
Start: 2024-06-27 | End: 2024-09-13

## 2024-06-27 NOTE — PROGRESS NOTES
Assessment & Plan     Upper respiratory tract infection, unspecified type  - benzonatate (TESSALON) 100 MG capsule  Dispense: 21 capsule; Refill: 1     Testing as noted above was collected before I entered the patient room.  Lung exam unremarkable and vital signs in the normal range we discussed on day 6 of her reported symptoms that is still consistent with a viral upper respiratory illness.  Given her increased cough over the last few days with a low suspicion for pneumonia I made Tessalon available to her.  Return as needed if symptoms escalate.    See AVS summary for additional recommendations reviewed with patient during this visit.         Nickolas Saleh MD   [place] [facility]    Kiesha Bess is a 24 year old female who presents to clinic today for the following health issues:  Chief Complaint   Patient presents with    Urgent Care    Cough     Chest congestion since Saturday, coughing since Monday, fever on and off. Pt was seen here on 6/21. Pt works at a  and strep and covid are going around.     HPI    Patient is on day 6 of her illness comes in today as her symptoms have moved into her chest although she has had resolution of her low-grade fevers which she originally presented for earlier in the week.  She has had no shortness of breath at rest or with exertion.  She has been using cough syrup as of yesterday to treat her symptoms.  Couple months ago she presented with a sinus infection which she received antibiotic from an outside group she believes was either amoxicillin or amoxicillin clavulanic acid and this did clear her colored drainage.  Her drainage at this time is a clear fluid.  Slight ear discomfort bilaterally        Patient Active Problem List    Diagnosis Date Noted    Generalized anxiety disorder 06/15/2024     Priority: Medium    Migraine without status migrainosus, not intractable, unspecified migraine type 06/15/2024     Priority: Medium    MDD (major depressive  disorder), recurrent episode, moderate (H) 11/28/2023     Priority: Medium    Major depressive disorder, recurrent episode, moderate (H) 11/13/2023     Priority: Medium    Menorrhagia with irregular cycle 02/06/2023     Priority: Medium    BRUCE (generalized anxiety disorder) 02/06/2023     Priority: Medium    Depression, unspecified depression type 02/06/2023     Priority: Medium    Borderline personality disorder (H) 01/26/2023     Priority: Medium    Migraine without aura and without status migrainosus, not intractable 07/25/2021     Priority: Medium    Migraine with aura and without status migrainosus, not intractable 07/12/2021     Priority: Medium       Current Outpatient Medications   Medication Sig Dispense Refill    benzonatate (TESSALON) 100 MG capsule Take 1 capsule (100 mg) by mouth 3 times daily as needed for cough 21 capsule 1    citalopram (CELEXA) 40 MG tablet Take 1 tablet (40 mg) by mouth daily Start with half tablet daily for 2-3 weeks then whole tablet 90 tablet 3    norethindrone (MICRONOR) 0.35 MG tablet Take 1 tablet (0.35 mg) by mouth daily 90 tablet 3    prochlorperazine (COMPAZINE) 10 MG tablet Take 1 tablet (10 mg) by mouth every 6 hours as needed for nausea or vomiting (and persistent migraine) 30 tablet 4    propranolol (INDERAL) 10 MG tablet Take 1 tablet (10 mg) by mouth 2 times daily as needed (anxiety) (Patient not taking: Reported on 5/9/2024) 10 tablet 0    propranolol (INDERAL) 40 MG tablet Take 1 tablet (40 mg) by mouth 3 times daily Then we can try 60mg extended release 90 tablet 0    SUMAtriptan (IMITREX) 50 MG tablet Take 1-2 tablets ( mg) by mouth at onset of headache for migraine May repeat in 2 hours. Max 4 tablets/24 hours. 18 tablet 11     No current facility-administered medications for this visit.           Objective    /79   Pulse 58   Temp 98  F (36.7  C) (Temporal)   Resp 16   Wt 68.9 kg (152 lb)   LMP 06/09/2024   SpO2 100%   BMI 21.81 kg/m     Physical Exam       Ears: normal TMs  CV: RRR no m/r/g  Pulm: clear bilaterally  GEN: NAD, good historian  Nose: no thick drainage, no polyps, R> L inflamed turbinates  Throat: no enlarged tonsils,no trismus  Results for orders placed or performed in visit on 06/27/24   Streptococcus A Rapid Screen w/Reflex to PCR - Clinic Collect     Status: Normal    Specimen: Throat; Swab   Result Value Ref Range    Group A Strep antigen Negative Negative                     The use of Dragon/[employer] dictation services may have been used to construct the content in this note; any grammatical or spelling errors are non-intentional. Please contact the author of this note directly if you are in need of any clarification.

## 2024-06-27 NOTE — PATIENT INSTRUCTIONS
Ibuprofen 400mg every 4-6 hours as needed for discomfort/pain      For cough (can take all of them together):   - Dextromethorphan 'DM' (over the counter - can be found in Robitussin/Delsym/generic cough meds)  - Honey: lozenges/cough drops or mix honey in warm water  - Tessalon/Benzonatate (prescription) every 8 hours as needed      If you develop new fever, shortness of breath, colored thick nasal drainage then seek help right away        If symptoms not better in the next few days please seek help

## 2024-06-28 LAB — GROUP A STREP BY PCR: NOT DETECTED

## 2024-06-29 LAB — SARS-COV-2 RNA RESP QL NAA+PROBE: NEGATIVE

## 2024-07-05 ENCOUNTER — OFFICE VISIT (OUTPATIENT)
Dept: URGENT CARE | Facility: URGENT CARE | Age: 24
End: 2024-07-05
Payer: COMMERCIAL

## 2024-07-05 VITALS
WEIGHT: 148 LBS | TEMPERATURE: 100.9 F | HEART RATE: 104 BPM | DIASTOLIC BLOOD PRESSURE: 87 MMHG | BODY MASS INDEX: 21.92 KG/M2 | HEIGHT: 69 IN | OXYGEN SATURATION: 99 % | SYSTOLIC BLOOD PRESSURE: 120 MMHG

## 2024-07-05 DIAGNOSIS — R07.0 THROAT PAIN: ICD-10-CM

## 2024-07-05 DIAGNOSIS — G43.909 MIGRAINE WITHOUT STATUS MIGRAINOSUS, NOT INTRACTABLE, UNSPECIFIED MIGRAINE TYPE: ICD-10-CM

## 2024-07-05 DIAGNOSIS — J02.0 STREPTOCOCCAL PHARYNGITIS: Primary | ICD-10-CM

## 2024-07-05 DIAGNOSIS — G43.809 OTHER MIGRAINE WITHOUT STATUS MIGRAINOSUS, NOT INTRACTABLE: ICD-10-CM

## 2024-07-05 LAB — DEPRECATED S PYO AG THROAT QL EIA: POSITIVE

## 2024-07-05 PROCEDURE — 96372 THER/PROPH/DIAG INJ SC/IM: CPT | Performed by: PHYSICIAN ASSISTANT

## 2024-07-05 PROCEDURE — 87880 STREP A ASSAY W/OPTIC: CPT | Performed by: PHYSICIAN ASSISTANT

## 2024-07-05 PROCEDURE — 99213 OFFICE O/P EST LOW 20 MIN: CPT | Mod: 25 | Performed by: PHYSICIAN ASSISTANT

## 2024-07-05 RX ORDER — PROPRANOLOL HYDROCHLORIDE 40 MG/1
40 TABLET ORAL 3 TIMES DAILY
Qty: 270 TABLET | Refills: 1 | OUTPATIENT
Start: 2024-07-05

## 2024-07-05 RX ORDER — PENICILLIN V POTASSIUM 500 MG/1
500 TABLET, FILM COATED ORAL 2 TIMES DAILY
Qty: 20 TABLET | Refills: 0 | Status: SHIPPED | OUTPATIENT
Start: 2024-07-05 | End: 2024-07-15

## 2024-07-05 RX ORDER — KETOROLAC TROMETHAMINE 30 MG/ML
30 INJECTION, SOLUTION INTRAMUSCULAR; INTRAVENOUS ONCE
Status: COMPLETED | OUTPATIENT
Start: 2024-07-05 | End: 2024-07-05

## 2024-07-05 RX ADMIN — KETOROLAC TROMETHAMINE 30 MG: 30 INJECTION, SOLUTION INTRAMUSCULAR; INTRAVENOUS at 13:27

## 2024-07-06 NOTE — PROGRESS NOTES
SUBJECTIVE:  Maria Alejandra Betts is a 24 year old female with a chief complaint of sore throat.  Onset of symptoms was 1 day(s) ago.    Course of illness: sudden onset.  Severity moderate  Current and Associated symptoms: sore throat, migraine (typical) triggered. Not responding to imitrex  Treatment measures tried include Fluids and Rest.  Predisposing factors include exposure to strep.    Past Medical History:   Diagnosis Date    Depressive disorder 2019     Current Outpatient Medications   Medication Sig Dispense Refill    citalopram (CELEXA) 40 MG tablet Take 1 tablet (40 mg) by mouth daily Start with half tablet daily for 2-3 weeks then whole tablet 90 tablet 3    norethindrone (MICRONOR) 0.35 MG tablet Take 1 tablet (0.35 mg) by mouth daily 90 tablet 3    penicillin V (VEETID) 500 MG tablet Take 1 tablet (500 mg) by mouth 2 times daily for 10 days 20 tablet 0    prochlorperazine (COMPAZINE) 10 MG tablet Take 1 tablet (10 mg) by mouth every 6 hours as needed for nausea or vomiting (and persistent migraine) 30 tablet 4    propranolol (INDERAL) 10 MG tablet Take 1 tablet (10 mg) by mouth 2 times daily as needed (anxiety) 10 tablet 0    propranolol (INDERAL) 40 MG tablet Take 1 tablet (40 mg) by mouth 3 times daily Then we can try 60mg extended release 90 tablet 0    SUMAtriptan (IMITREX) 50 MG tablet Take 1-2 tablets ( mg) by mouth at onset of headache for migraine May repeat in 2 hours. Max 4 tablets/24 hours. 18 tablet 11    benzonatate (TESSALON) 100 MG capsule Take 1 capsule (100 mg) by mouth 3 times daily as needed for cough (Patient not taking: Reported on 7/5/2024) 21 capsule 1     Social History     Tobacco Use    Smoking status: Every Day     Types: Vaping Device    Smokeless tobacco: Never   Substance Use Topics    Alcohol use: Yes     Comment: rarely       ROS:  Review of systems negative except as stated above.    OBJECTIVE:   /87   Pulse 104   Temp (!) 100.9  F (38.3  C) (Temporal)   Ht  "1.753 m (5' 9\")   Wt 67.1 kg (148 lb)   LMP 06/09/2024   SpO2 99%   BMI 21.86 kg/m    GENERAL APPEARANCE: healthy, alert and no distress  HENT: ear canals and TM's normal.  Nose normal.  Pharynx erythematous with some exudate noted.  NECK: supple, non-tender to palpation, no adenopathy noted  RESP: lungs clear to auscultation - no rales, rhonchi or wheezes  CV: regular rates and rhythm, normal S1 S2, no murmur noted  SKIN: no suspicious lesions or rashes    Results for orders placed or performed in visit on 07/05/24   Streptococcus A Rapid Screen w/Reflex to PCR - Clinic Collect     Status: Abnormal    Specimen: Throat; Swab   Result Value Ref Range    Group A Strep antigen Positive (A) Negative         ASSESSMENT:  (J02.0) Streptococcal pharyngitis  (primary encounter diagnosis)  Plan: penicillin V (VEETID) 500 MG tablet      (R07.0) Throat pain  Plan: Streptococcus A Rapid Screen w/Reflex to PCR -         Clinic Collect      (G43.809) Other migraine without status migrainosus, not intractable  Plan: ketorolac (TORADOL) injection 30 mg        Red flags and emergent follow up discussed, and understood by patient  Follow up with PCP if symptoms worsen or fail to improve        "

## 2024-08-28 ENCOUNTER — TRANSFERRED RECORDS (OUTPATIENT)
Dept: HEALTH INFORMATION MANAGEMENT | Facility: CLINIC | Age: 24
End: 2024-08-28
Payer: COMMERCIAL

## 2024-09-05 ENCOUNTER — TRANSFERRED RECORDS (OUTPATIENT)
Dept: HEALTH INFORMATION MANAGEMENT | Facility: CLINIC | Age: 24
End: 2024-09-05
Payer: COMMERCIAL

## 2024-09-10 ENCOUNTER — MEDICAL CORRESPONDENCE (OUTPATIENT)
Dept: HEALTH INFORMATION MANAGEMENT | Facility: CLINIC | Age: 24
End: 2024-09-10
Payer: COMMERCIAL

## 2024-09-10 ENCOUNTER — TRANSFERRED RECORDS (OUTPATIENT)
Dept: HEALTH INFORMATION MANAGEMENT | Facility: CLINIC | Age: 24
End: 2024-09-10
Payer: COMMERCIAL

## 2024-09-13 ENCOUNTER — OFFICE VISIT (OUTPATIENT)
Dept: OPHTHALMOLOGY | Facility: CLINIC | Age: 24
End: 2024-09-13
Attending: OPTOMETRIST
Payer: COMMERCIAL

## 2024-09-13 DIAGNOSIS — H52.202 MYOPIA OF LEFT EYE WITH ASTIGMATISM: ICD-10-CM

## 2024-09-13 DIAGNOSIS — H52.12 MYOPIA OF LEFT EYE WITH ASTIGMATISM: ICD-10-CM

## 2024-09-13 DIAGNOSIS — H52.01 HYPEROPIA OF RIGHT EYE WITH ASTIGMATISM: ICD-10-CM

## 2024-09-13 DIAGNOSIS — H52.201 HYPEROPIA OF RIGHT EYE WITH ASTIGMATISM: ICD-10-CM

## 2024-09-13 DIAGNOSIS — G43.E11 INTRACTABLE CHRONIC MIGRAINE WITH AURA WITH STATUS MIGRAINOSUS: Primary | ICD-10-CM

## 2024-09-13 PROCEDURE — 99213 OFFICE O/P EST LOW 20 MIN: CPT | Performed by: OPTOMETRIST

## 2024-09-13 PROCEDURE — 92004 COMPRE OPH EXAM NEW PT 1/>: CPT | Performed by: OPTOMETRIST

## 2024-09-13 ASSESSMENT — CONF VISUAL FIELD
OD_INFERIOR_TEMPORAL_RESTRICTION: 0
OS_INFERIOR_NASAL_RESTRICTION: 0
OD_SUPERIOR_TEMPORAL_RESTRICTION: 0
OS_SUPERIOR_NASAL_RESTRICTION: 0
OD_INFERIOR_NASAL_RESTRICTION: 0
OS_SUPERIOR_TEMPORAL_RESTRICTION: 0
OS_NORMAL: 1
OD_NORMAL: 1
OD_SUPERIOR_NASAL_RESTRICTION: 0
OS_INFERIOR_TEMPORAL_RESTRICTION: 0

## 2024-09-13 ASSESSMENT — VISUAL ACUITY
OD_CC+: -2
METHOD: SNELLEN - LINEAR
OD_CC: 20/20
OS_CC+: -2
OS_CC: 20/20
CORRECTION_TYPE: GLASSES

## 2024-09-13 ASSESSMENT — REFRACTION_MANIFEST
OD_SPHERE: +0.25
OS_CYLINDER: +0.25
OD_AXIS: 024
OD_CYLINDER: +0.50
OS_AXIS: 003
OS_SPHERE: -0.75

## 2024-09-13 ASSESSMENT — REFRACTION_WEARINGRX
OD_AXIS: 024
OD_SPHERE: PLANO
OS_AXIS: 003
OS_CYLINDER: +0.25
OS_SPHERE: -0.25
OD_CYLINDER: +0.50

## 2024-09-13 ASSESSMENT — TONOMETRY
OS_IOP_MMHG: 16
OD_IOP_MMHG: 18
IOP_METHOD: TONOPEN

## 2024-09-13 ASSESSMENT — SLIT LAMP EXAM - LIDS
COMMENTS: NORMAL
COMMENTS: NORMAL

## 2024-09-13 ASSESSMENT — EXTERNAL EXAM - RIGHT EYE: OD_EXAM: NORMAL

## 2024-09-13 ASSESSMENT — EXTERNAL EXAM - LEFT EYE: OS_EXAM: NORMAL

## 2024-09-13 ASSESSMENT — CUP TO DISC RATIO
OD_RATIO: 0.2
OS_RATIO: 0.2

## 2024-09-13 NOTE — NURSING NOTE
Chief Complaints and History of Present Illnesses   Patient presents with    COMPREHENSIVE EYE EXAM     Chief Complaint(s) and History of Present Illness(es)       COMPREHENSIVE EYE EXAM              Associated symptoms: eye pain (when around bright lights), photophobia and floaters.  Negative for dryness and flashes    Treatments tried: no treatments              Comments    Pt reports light sensitivity due to a brain injury from a couple years back.   Pt is not diabetic.     Massiel Waddell OA 1:22 PM September 13, 2024

## 2024-09-13 NOTE — PATIENT INSTRUCTIONS
Routine exam   No problems other than chronic migraines     Chronic migraine causing ora and photophobia  New prescription for glasses

## 2024-09-13 NOTE — PROGRESS NOTES
Assessment & Plan       Maria Alejandra Betts is a 24 year old female with the following diagnoses:   1. Intractable chronic migraine with aura with status migrainosus - Both Eyes    2. Hyperopia of right eye with astigmatism - Right Eye    3. Myopia of left eye with astigmatism - Left Eye       Routine exam   No problems other than chronic migraines     Chronic migraine causing ora and photophobia  New prescription for glasses     Patient disposition:   No follow-ups on file.          Complete documentation of historical and exam elements from today's encounter can be found in the full encounter summary report (not reduplicated in this progress note). I personally obtained the chief complaint(s) and history of present illness.  I confirmed and edited as necessary the review of systems, past medical/surgical history, family history, social history, and examination findings as documented by others; and I examined the patient myself. I personally reviewed the relevant tests, images, and reports as documented above. I formulated and edited as necessary the assessment and plan and discussed the findings and management plan with the patient and family.  Dr. Joey Ellison

## 2024-09-16 ENCOUNTER — TELEPHONE (OUTPATIENT)
Dept: OTHER | Facility: CLINIC | Age: 24
End: 2024-09-16
Payer: COMMERCIAL

## 2024-09-16 NOTE — TELEPHONE ENCOUNTER
[facility]    Who is the name of the provider?:  WIL SUNSHINE   What is the location you see this provider at/preferred location?: [place]  Person calling / Facility: Maria Alejandra Betts  Phone number:  [phone] (home)   Nurse call back needed:  Yes or route to scheduling     Reason for call:  Patient calling to schedule- requesting Dr Sunshine, referral from [facility] for possible TOS.    Pharmacy location: n/a  Outside Imaging: n/a   Can we leave a detailed message on this number?  YES     9/16/2024, 10:17 AM

## 2024-09-16 NOTE — TELEPHONE ENCOUNTER
Referral received via fax.    [facility] contacted for additional records.  Will triage referral once received.    Terra Vo, ADAMAN, RN, CV-BC, CNOR  [facility]     Patient is a 67y Male whom presented to the hospital with LT leg pain and swelling.  Was sent from cardiology clinic where he had gone for routine evaluation as was noted  to have RT leg swelling, to R/O DVT. ( doppler US negative for DVT).admitted for treatment of leg cellulitis   Renal consulted for ESRD    A/P  #ESRD minimal UF today as was tachycardic  # hyponatremia . In a patient with hypervolemia and ling pathology.  s/p NA modelling today on HD  D/C salt tabs  fluid restriction 1.2 litres/day.  RPT BMP in AM   # Anemia of CKD. stable off ROSARIO  # lytes and acid base at goal  on mx for bilateral leg cellulitis, Neg DVT.   Plans for chest tube placement once INR improves Patient is a 67y Male whom presented to the hospital with LT leg pain and swelling.  Was sent from cardiology clinic where he had gone for routine evaluation as was noted  to have RT leg swelling, to R/O DVT. ( doppler US negative for DVT).admitted for treatment of leg cellulitis   Renal consulted for ESRD    A/P  #ESRD minimal UF today as was tachycardic  # hyponatremia . In a patient with hypervolemia and ling pathology.  s/p NA modelling today on HD  D/C salt tabs  D/C gapantin  fluid restriction 1.2 litres/day.  RPT BMP in AM   # Anemia of CKD. stable off ROSARIO  # lytes and acid base at goal  on mx for bilateral leg cellulitis, Neg DVT.   Plans for chest tube placement once INR improves

## 2024-09-17 NOTE — TELEPHONE ENCOUNTER
Unable to leave voicemail to due mailbox being full. Flagging for second attempt.    NEW VASCULAR PATIENT consult with Dr. Blunt  Please schedule this at next available     Referring provider requesting Vascular Surgery, however per clinic scheduling guidelines, patient is to be seen by Vascular Medicine for consultation first.      Appt note:  Pt referred to [facility] by Dr. Ramsey for right thoracic outlet syndrome

## 2024-09-17 NOTE — TELEPHONE ENCOUNTER
Referral received via Fax on 09/16/24.    Pt referred to [facility] by Dr. Ramsey for right thoracic outlet syndrome.  Office notes & MRI cervical spine w/o contrast report received.  Faxed to HIMs.  MRI images pushed to PACs.        Routing to scheduling to coordinate the following:  NEW VASCULAR PATIENT consult with Dr. Blunt  Please schedule this at next available    Referring provider requesting Vascular Surgery, however per clinic scheduling guidelines, patient is to be seen by Vascular Medicine for consultation first.     Appt note:  Pt referred to [facility] by Dr. Ramsey for right thoracic outlet syndrome.

## 2024-09-25 ENCOUNTER — OFFICE VISIT (OUTPATIENT)
Dept: URGENT CARE | Facility: URGENT CARE | Age: 24
End: 2024-09-25
Payer: COMMERCIAL

## 2024-09-25 ENCOUNTER — ANCILLARY PROCEDURE (OUTPATIENT)
Dept: GENERAL RADIOLOGY | Facility: CLINIC | Age: 24
End: 2024-09-25
Attending: INTERNAL MEDICINE
Payer: COMMERCIAL

## 2024-09-25 VITALS
SYSTOLIC BLOOD PRESSURE: 103 MMHG | WEIGHT: 150.8 LBS | HEART RATE: 71 BPM | TEMPERATURE: 98.3 F | BODY MASS INDEX: 22.27 KG/M2 | DIASTOLIC BLOOD PRESSURE: 72 MMHG | OXYGEN SATURATION: 100 %

## 2024-09-25 DIAGNOSIS — R09.89 RHONCHI AT RIGHT LUNG BASE: ICD-10-CM

## 2024-09-25 DIAGNOSIS — Z20.822 EXPOSURE TO 2019 NOVEL CORONAVIRUS: ICD-10-CM

## 2024-09-25 DIAGNOSIS — J18.9 PNEUMONIA OF RIGHT LOWER LOBE DUE TO INFECTIOUS ORGANISM: Primary | ICD-10-CM

## 2024-09-25 DIAGNOSIS — J06.9 VIRAL URI WITH COUGH: ICD-10-CM

## 2024-09-25 DIAGNOSIS — R50.9 ACUTE FEBRILE ILLNESS: ICD-10-CM

## 2024-09-25 DIAGNOSIS — Z20.818 STREPTOCOCCUS EXPOSURE: ICD-10-CM

## 2024-09-25 LAB
BASOPHILS # BLD AUTO: 0.1 10E3/UL (ref 0–0.2)
BASOPHILS NFR BLD AUTO: 1 %
DEPRECATED S PYO AG THROAT QL EIA: NEGATIVE
EOSINOPHIL # BLD AUTO: 0.7 10E3/UL (ref 0–0.7)
EOSINOPHIL NFR BLD AUTO: 9 %
ERYTHROCYTE [DISTWIDTH] IN BLOOD BY AUTOMATED COUNT: 13.2 % (ref 10–15)
FLUAV AG SPEC QL IA: NEGATIVE
FLUBV AG SPEC QL IA: NEGATIVE
HCT VFR BLD AUTO: 45.4 % (ref 35–47)
HGB BLD-MCNC: 14.1 G/DL (ref 11.7–15.7)
IMM GRANULOCYTES # BLD: 0 10E3/UL
IMM GRANULOCYTES NFR BLD: 0 %
LYMPHOCYTES # BLD AUTO: 2.2 10E3/UL (ref 0.8–5.3)
LYMPHOCYTES NFR BLD AUTO: 27 %
MCH RBC QN AUTO: 26.8 PG (ref 26.5–33)
MCHC RBC AUTO-ENTMCNC: 31.1 G/DL (ref 31.5–36.5)
MCV RBC AUTO: 86 FL (ref 78–100)
MONOCYTES # BLD AUTO: 1.3 10E3/UL (ref 0–1.3)
MONOCYTES NFR BLD AUTO: 17 %
NEUTROPHILS # BLD AUTO: 3.8 10E3/UL (ref 1.6–8.3)
NEUTROPHILS NFR BLD AUTO: 47 %
PLATELET # BLD AUTO: 357 10E3/UL (ref 150–450)
RBC # BLD AUTO: 5.27 10E6/UL (ref 3.8–5.2)
WBC # BLD AUTO: 8 10E3/UL (ref 4–11)

## 2024-09-25 PROCEDURE — 87804 INFLUENZA ASSAY W/OPTIC: CPT | Performed by: INTERNAL MEDICINE

## 2024-09-25 PROCEDURE — 85025 COMPLETE CBC W/AUTO DIFF WBC: CPT | Performed by: INTERNAL MEDICINE

## 2024-09-25 PROCEDURE — 71046 X-RAY EXAM CHEST 2 VIEWS: CPT | Mod: TC | Performed by: RADIOLOGY

## 2024-09-25 PROCEDURE — 36415 COLL VENOUS BLD VENIPUNCTURE: CPT | Performed by: INTERNAL MEDICINE

## 2024-09-25 PROCEDURE — 87635 SARS-COV-2 COVID-19 AMP PRB: CPT | Performed by: INTERNAL MEDICINE

## 2024-09-25 PROCEDURE — 99214 OFFICE O/P EST MOD 30 MIN: CPT | Performed by: INTERNAL MEDICINE

## 2024-09-25 PROCEDURE — 87651 STREP A DNA AMP PROBE: CPT | Performed by: INTERNAL MEDICINE

## 2024-09-25 RX ORDER — AZITHROMYCIN 250 MG/1
TABLET, FILM COATED ORAL
Qty: 6 TABLET | Refills: 0 | Status: SHIPPED | OUTPATIENT
Start: 2024-09-25 | End: 2024-09-30

## 2024-09-25 ASSESSMENT — ENCOUNTER SYMPTOMS
DIARRHEA: 0
VOMITING: 0
WHEEZING: 1
RHINORRHEA: 1
SINUS PRESSURE: 1
MYALGIAS: 0
HEADACHES: 1

## 2024-09-25 ASSESSMENT — PAIN SCALES - GENERAL: PAINLEVEL: SEVERE PAIN (6)

## 2024-09-25 NOTE — PATIENT INSTRUCTIONS
Strep test negative  Influenza test negative  COVID test will be back tomorrow    Blood counts are normal.  White count is normal this suggest you do not have a serious underlying infection.    Chest x-ray shows slight streakiness in the lower lobe on lateral review.  Radiology review pending    As you also have noise in the right lower lobe, I would like you to treat you for pneumonia    Take Z-Forrest antibiotic    Symptoms should improve over the next 2 days  Follow-up with your primary provider next 1-2 week to make sure lung exam is normal      Component      Latest Ref Rng 9/25/2024  6:08 PM   WBC      4.0 - 11.0 10e3/uL 8.0    RBC Count      3.80 - 5.20 10e6/uL 5.27 (H)    Hemoglobin      11.7 - 15.7 g/dL 14.1    Hematocrit      35.0 - 47.0 % 45.4    MCV      78 - 100 fL 86    MCH      26.5 - 33.0 pg 26.8    MCHC      31.5 - 36.5 g/dL 31.1 (L)    RDW      10.0 - 15.0 % 13.2    Platelet Count      150 - 450 10e3/uL 357    % Neutrophils      % 47    % Lymphocytes      % 27    % Monocytes      % 17    % Eosinophils      % 9    % Basophils      % 1    % Immature Granulocytes      % 0    Absolute Neutrophils      1.6 - 8.3 10e3/uL 3.8    Absolute Lymphocytes      0.8 - 5.3 10e3/uL 2.2    Absolute Monocytes      0.0 - 1.3 10e3/uL 1.3    Absolute Eosinophils      0.0 - 0.7 10e3/uL 0.7    Absolute Basophils      0.0 - 0.2 10e3/uL 0.1    Absolute Immature Granulocytes      <=0.4 10e3/uL 0.0

## 2024-09-25 NOTE — PROGRESS NOTES
ASSESSMENT AND PLAN:      ICD-10-CM    1. Pneumonia of right lower lobe due to infectious organism  J18.9 azithromycin (ZITHROMAX) 250 MG tablet      2. Viral URI with cough  J06.9       3. Acute febrile illness  R50.9 Influenza A & B Antigen - Clinic Collect     Symptomatic COVID-19 Virus (Coronavirus) by PCR Nasopharyngeal     Streptococcus A Rapid Screen w/Reflex to PCR - Clinic Collect     CBC with platelets and differential     CBC with platelets and differential     Group A Streptococcus PCR Throat Swab      4. Streptococcus exposure  Z20.818 Streptococcus A Rapid Screen w/Reflex to PCR - Clinic Collect     Group A Streptococcus PCR Throat Swab      5. Exposure to 2019 novel coronavirus  Z20.822 Symptomatic COVID-19 Virus (Coronavirus) by PCR Nasopharyngeal      6. Rhonchi at right lung base  R09.89 XR Chest 2 Views     CBC with platelets and differential     CBC with platelets and differential        Patient Instructions     Strep test negative  Influenza test negative  COVID test will be back tomorrow    Blood counts are normal.  White count is normal this suggest you do not have a serious underlying infection.    Chest x-ray shows slight streakiness in the lower lobe on lateral review.  Radiology review pending    As you also have noise in the right lower lobe, I would like you to treat you for pneumonia    Take Z-Forrest antibiotic    Symptoms should improve over the next 2 days  Follow-up with your primary provider next 1-2 week to make sure lung exam is normal      Component      Latest Ref [facility] 9/25/2024  6:08 PM   WBC      4.0 - 11.0 10e3/uL 8.0    RBC Count      3.80 - 5.20 10e6/uL 5.27 (H)    Hemoglobin      11.7 - 15.7 g/dL 14.1    Hematocrit      35.0 - 47.0 % 45.4    MCV      78 - 100 fL 86    MCH      26.5 - 33.0 pg 26.8    MCHC      31.5 - 36.5 g/dL 31.1 (L)    RDW      10.0 - 15.0 % 13.2    Platelet Count      150 - 450 10e3/uL 357    % Neutrophils      % 47    % Lymphocytes      % 27    %  Monocytes      % 17    % Eosinophils      % 9    % Basophils      % 1    % Immature Granulocytes      % 0    Absolute Neutrophils      1.6 - 8.3 10e3/uL 3.8    Absolute Lymphocytes      0.8 - 5.3 10e3/uL 2.2    Absolute Monocytes      0.0 - 1.3 10e3/uL 1.3    Absolute Eosinophils      0.0 - 0.7 10e3/uL 0.7    Absolute Basophils      0.0 - 0.2 10e3/uL 0.1    Absolute Immature Granulocytes      <=0.4 10e3/uL 0.0      Return in about 2 weeks (around 10/9/2024) for Follow-up pneumonia/noise in your lungs.        Kristi Fuentes MD  [facility]    Subjective     Maria Alejandra Betts is a 24 year old who presents for Patient presents with:  Cough: Cough ,fever , congestion and migraine     Established patient of [facility].      Had congestion/allergies 10 days      2 days fever 102  Cough 3 days  Chest congestion  Current and Associated symptoms: fever, stuffy nose, cough - non-productive, and headache  Treatment measures tried include Decongestants, Antihistamine, and OTC Cough med.  Predisposing factors include ill contact: School. Covid exposure & strep    Covid negative     Review of Systems   HENT:  Positive for congestion, rhinorrhea and sinus pressure. Negative for ear pain. Sore throat: scratchy.   Respiratory:  Positive for wheezing.    Gastrointestinal:  Negative for diarrhea and vomiting.   Musculoskeletal:  Negative for myalgias.   Neurological:  Positive for headaches.           Objective    /72 (BP Location: Right arm, Patient Position: Sitting, Cuff Size: Adult Regular)   Pulse 71   Temp 98.3  F (36.8  C) (Oral)   Wt 68.4 kg (150 lb 12.8 oz)   SpO2 100%   BMI 22.27 kg/m    Physical Exam  Vitals reviewed.   Constitutional:       Appearance: Normal appearance. She is ill-appearing.   HENT:      Right Ear: Tympanic membrane normal.      Left Ear: Tympanic membrane normal.      Nose: Congestion present.      Mouth/Throat:      Mouth: Mucous membranes are moist.      Pharynx:  Oropharynx is clear.   Eyes:      Conjunctiva/sclera: Conjunctivae normal.   Cardiovascular:      Rate and Rhythm: Normal rate and regular rhythm.      Pulses: Normal pulses.      Heart sounds: Normal heart sounds.   Pulmonary:      Breath sounds: Rhonchi (R low base) present.   Neurological:      Mental Status: She is alert.            Chest x-ray per my review -white streak noted prominent lateral view.  Consistent with noise heard at base right base        Results for orders placed or performed in visit on 09/25/24 (from the past 24 hour(s))   Influenza A & B Antigen - Clinic Collect    Specimen: Nasopharyngeal; Swab   Result Value Ref Range    Influenza A antigen Negative Negative    Influenza B antigen Negative Negative    Narrative    Test results must be correlated with clinical data. If necessary, results should be confirmed by a molecular assay or viral culture.   Streptococcus A Rapid Screen w/Reflex to PCR - Clinic Collect    Specimen: Throat; Swab   Result Value Ref Range    Group A Strep antigen Negative Negative   CBC with platelets and differential    Narrative    The following orders were created for panel order CBC with platelets and differential.  Procedure                               Abnormality         Status                     ---------                               -----------         ------                     CBC with platelets and d...[[id number]]  Abnormal            Final result                 Please view results for these tests on the individual orders.   CBC with platelets and differential   Result Value Ref Range    WBC Count 8.0 4.0 - 11.0 10e3/uL    RBC Count 5.27 (H) 3.80 - 5.20 10e6/uL    Hemoglobin 14.1 11.7 - 15.7 g/dL    Hematocrit 45.4 35.0 - 47.0 %    MCV 86 78 - 100 fL    MCH 26.8 26.5 - 33.0 pg    MCHC 31.1 (L) 31.5 - 36.5 g/dL    RDW 13.2 10.0 - 15.0 %    Platelet Count 357 150 - 450 10e3/uL    % Neutrophils 47 %    % Lymphocytes 27 %    % Monocytes 17 %    % Eosinophils  9 %    % Basophils 1 %    % Immature Granulocytes 0 %    Absolute Neutrophils 3.8 1.6 - 8.3 10e3/uL    Absolute Lymphocytes 2.2 0.8 - 5.3 10e3/uL    Absolute Monocytes 1.3 0.0 - 1.3 10e3/uL    Absolute Eosinophils 0.7 0.0 - 0.7 10e3/uL    Absolute Basophils 0.1 0.0 - 0.2 10e3/uL    Absolute Immature Granulocytes 0.0 <=0.4 10e3/uL

## 2024-09-25 NOTE — LETTER
September 25, 2024      Maria Alejandra Betts  [address] [address]        To Whom It May Concern:    Maria Alejandra Betts  was seen on September 25, 2024.  Please excuse her work absence for the remaining week due to illness.        Sincerely,        Kristi Fuentes MD

## 2024-09-26 LAB — GROUP A STREP BY PCR: NOT DETECTED

## 2024-09-27 ENCOUNTER — TELEPHONE (OUTPATIENT)
Dept: FAMILY MEDICINE | Facility: CLINIC | Age: 24
End: 2024-09-27
Payer: COMMERCIAL

## 2024-09-27 LAB — SARS-COV-2 RNA RESP QL NAA+PROBE: POSITIVE

## 2024-09-27 NOTE — TELEPHONE ENCOUNTER
Attempted to call pt. Message was left requesting pt to call the clinic back. Thanks    Renu Santiago RN  [facility]

## 2024-09-27 NOTE — TELEPHONE ENCOUNTER
"Patient positive for COVID.  Given an oral antibiotic in UC for pneumonia, so probably doesn't need paxlovid as well, but please call and check on patient's symptoms.    Thanks  Elis \"Valente\" DAMIAN Heart for Dr. Kenzie Walker while she is out of office   "

## 2024-09-27 NOTE — TELEPHONE ENCOUNTER
Patient Returning Call    Reason for call:  would like my lab results from 09/25 at [place] lab    Information relayed to patient:  n/a    Patient has additional questions:  Yes    What are your questions/concerns:  RESULTS    Who does the patient want to speak with:   nurse    Is an  needed?:  No      Could we send this information to you in [facility] [place] or would you prefer to receive a phone call?:   No preference   Okay to leave a detailed message?: Yes at Cell number on file:    Telephone Information:   Mobile [phone]

## 2024-09-28 ENCOUNTER — OFFICE VISIT (OUTPATIENT)
Dept: URGENT CARE | Facility: URGENT CARE | Age: 24
End: 2024-09-28
Payer: COMMERCIAL

## 2024-09-28 ENCOUNTER — ANCILLARY PROCEDURE (OUTPATIENT)
Dept: GENERAL RADIOLOGY | Facility: CLINIC | Age: 24
End: 2024-09-28
Attending: PREVENTIVE MEDICINE
Payer: COMMERCIAL

## 2024-09-28 VITALS
SYSTOLIC BLOOD PRESSURE: 111 MMHG | WEIGHT: 147 LBS | TEMPERATURE: 97.9 F | HEART RATE: 82 BPM | HEIGHT: 69 IN | DIASTOLIC BLOOD PRESSURE: 74 MMHG | OXYGEN SATURATION: 100 % | RESPIRATION RATE: 12 BRPM | BODY MASS INDEX: 21.77 KG/M2

## 2024-09-28 DIAGNOSIS — J68.3 REACTIVE AIRWAYS DYSFUNCTION SYNDROME (H): Primary | ICD-10-CM

## 2024-09-28 DIAGNOSIS — R06.02 SOB (SHORTNESS OF BREATH): ICD-10-CM

## 2024-09-28 PROCEDURE — 99214 OFFICE O/P EST MOD 30 MIN: CPT | Performed by: PREVENTIVE MEDICINE

## 2024-09-28 PROCEDURE — 71046 X-RAY EXAM CHEST 2 VIEWS: CPT | Mod: TC | Performed by: RADIOLOGY

## 2024-09-28 RX ORDER — PREDNISONE 20 MG/1
40 TABLET ORAL DAILY
Qty: 10 TABLET | Refills: 0 | Status: SHIPPED | OUTPATIENT
Start: 2024-09-28 | End: 2024-10-03

## 2024-09-28 RX ORDER — ALBUTEROL SULFATE 90 UG/1
2 AEROSOL, METERED RESPIRATORY (INHALATION) EVERY 4 HOURS PRN
Qty: 18 G | Refills: 0 | Status: SHIPPED | OUTPATIENT
Start: 2024-09-28

## 2024-09-28 NOTE — PROGRESS NOTES
Assessment & Plan     (J68.3) Reactive airways dysfunction syndrome (H)  (primary encounter diagnosis)    Doubt PE  Most consistent with reactive airways in setting of COVID infection  Prednisone for 5 days  Albuterol inhaler    Follow up in 5-7 days if not improving or sooner as needed             No follow-ups on file.    Kendall Vásquez MD  [facility]    Subjective     Maria Alejandra Betts is a 24 year old year old female who presents to clinic today for the following health issues:    Patient presents with:  shortness of breath: Since last Sunday getting worse. Tested pos for covid  09/25/24 here    This is a patient with covid infection (symptoms started six days ago).  Was seen here 3 days ago and had a negative cxr.  Has fatigue, some congestion.  Feels slightly more sob now.  Some cough and occasional wheezing. No personal or family hx of vte    Patient Active Problem List   Diagnosis    Migraine with aura and without status migrainosus, not intractable    Migraine without aura and without status migrainosus, not intractable    Borderline personality disorder (H)    Menorrhagia with irregular cycle    BRUCE (generalized anxiety disorder)    Depression, unspecified depression type    Major depressive disorder, recurrent episode, moderate (H)    MDD (major depressive disorder), recurrent episode, moderate (H)    Generalized anxiety disorder    Migraine without status migrainosus, not intractable, unspecified migraine type       Current Outpatient Medications   Medication Sig Dispense Refill    azithromycin (ZITHROMAX) 250 MG tablet Take 2 tablets (500 mg) by mouth daily for 1 day, THEN 1 tablet (250 mg) daily for 4 days. 6 tablet 0    citalopram (CELEXA) 40 MG tablet Take 1 tablet (40 mg) by mouth daily Start with half tablet daily for 2-3 weeks then whole tablet 90 tablet 3    norethindrone (MICRONOR) 0.35 MG tablet Take 1 tablet (0.35 mg) by mouth daily 90 tablet 3     "prochlorperazine (COMPAZINE) 10 MG tablet Take 1 tablet (10 mg) by mouth every 6 hours as needed for nausea or vomiting (and persistent migraine) 30 tablet 4    propranolol (INDERAL) 10 MG tablet Take 1 tablet (10 mg) by mouth 2 times daily as needed (anxiety) 10 tablet 0    propranolol (INDERAL) 40 MG tablet Take 1 tablet (40 mg) by mouth 3 times daily Then we can try 60mg extended release 90 tablet 0    SUMAtriptan (IMITREX) 50 MG tablet Take 1-2 tablets ( mg) by mouth at onset of headache for migraine May repeat in 2 hours. Max 4 tablets/24 hours. 18 tablet 11     No current facility-administered medications for this visit.       Past Medical History:   Diagnosis Date    Depressive disorder 2019       Social History   reports that she has been smoking vaping device. She has never used smokeless tobacco. She reports current alcohol use. She reports current drug use. Drug: Marijuana.    Family History   Problem Relation Age of Onset    Allergies Mother     Back Pain Mother     Asthma Mother     Heart block Maternal Grandmother     Heart Disease Maternal Grandmother     Allergies Maternal Grandmother     Asthma Maternal Grandmother     Heart Disease Paternal Grandmother     Diabetes Paternal Grandmother     Hypertension Paternal Grandmother     Allergies Sister     Glaucoma No family hx of     Macular Degeneration No family hx of     Retinal detachment No family hx of     Eye Surgery No family hx of        Review of Systems  Constitutional, HEENT, cardiovascular, pulmonary, GI, , musculoskeletal, neuro, skin, endocrine and psych systems are negative, except as otherwise noted.      Objective    /74   Pulse 82   Temp 97.9  F (36.6  C)   Resp 12   Ht 1.753 m (5' 9\")   Wt 66.7 kg (147 lb)   LMP 09/21/2024 (Approximate)   SpO2 100%   BMI 21.71 kg/m    Physical Exam   GENERAL: alert and no distress  EYES: Eyes grossly normal to inspection, PERRL and conjunctivae and sclerae normal  HENT: ear canals " and TM's normal, nose and mouth without ulcers or lesions  NECK: no adenopathy, no asymmetry, masses, or scars  RESP: lungs no crackles, occasional expiratory wheeze  CV: regular rate and rhythm, normal S1 S2, no S3 or S4, no murmur, click or rub, no peripheral edema  ABDOMEN: soft, nontender, no hepatosplenomegaly, no masses and bowel sounds normal  MS: no gross musculoskeletal defects noted, no edema  SKIN: no suspicious lesions or rashes  NEURO: Normal strength and tone, mentation intact and speech normal  PSYCH: mentation appears normal, affect normal/bright      CXR no infiltrate, nl cardiac silhouette per my read

## 2024-10-16 ENCOUNTER — TRANSFERRED RECORDS (OUTPATIENT)
Dept: HEALTH INFORMATION MANAGEMENT | Facility: CLINIC | Age: 24
End: 2024-10-16
Payer: COMMERCIAL

## 2024-11-01 ENCOUNTER — TELEPHONE (OUTPATIENT)
Dept: OTHER | Facility: CLINIC | Age: 24
End: 2024-11-01
Payer: COMMERCIAL

## 2024-11-01 NOTE — TELEPHONE ENCOUNTER
Please see 9/16/24 encounter. Patient has been referred to [facility] and was scheduled for new consult with Dr. Blunt on 10/7/24 which she cancelled. Please call patient back and re-schedule this new consult with Dr. Blunt per 9/16/24 referral encounter.    Stephanie CONLEY, RN    [facility]  Vascular [facility]  Office: [phone]  Fax: [phone]

## 2024-11-01 NOTE — TELEPHONE ENCOUNTER
[facility]    Who is the name of the provider?:  NONE   What is the location you see this provider at/preferred location?: [place]  Person calling / Facility: Maria Alejandra M Alpesh  Phone number:  [phone] (home)   Nurse call back needed:       Reason for call:  Patient called and stated a referral was placed for her to see Dr. Sunshine.         11/1/2024, 3:41 PM

## 2024-11-03 ASSESSMENT — HEADACHE IMPACT TEST (HIT 6)
HOW OFTEN HAVE YOU FELT FED UP OR IRRITATED BECAUSE OF YOUR HEADACHES: VERY OFTEN
HOW OFTEN HAVE YOU FELT TOO TIRED TO WORK BECAUSE OF YOUR HEADACHES: SOMETIMES
HOW OFTEN DID HEADACHS LIMIT CONCENTRATION ON WORK OR DAILY ACTIVITY: SOMETIMES
HIT6 TOTAL SCORE: 66
WHEN YOU HAVE A HEADACHE HOW OFTEN DO YOU WISH YOU COULD LIE DOWN: ALWAYS
HOW OFTEN DO HEADACHES LIMIT YOUR DAILY ACTIVITIES: VERY OFTEN
WHEN YOU HAVE HEADACHES HOW OFTEN IS THE PAIN SEVERE: VERY OFTEN

## 2024-11-03 ASSESSMENT — MIGRAINE DISABILITY ASSESSMENT (MIDAS)
ON A SCALE FROM 0-10 ON AVERAGE HOW PAINFUL WERE HEADACHES: 9
HOW MANY DAYS DID YOU MISS WORK OR SCHOOL BECAUSE OF HEADACHES: 5
HOW MANY DAYS WAS HOUSEWORK PRODUCTIVITY CUT IN HALF DUE TO HEADACHES: 14
HOW MANY DAYS WAS YOUR PRODUCTIVITY CUT IN HALF BECAUSE OF HEADACHES: 8
HOW MANY DAYS IN THE PAST 3 MONTHS HAVE YOU HAD A HEADACHE: 18
HOW MANY DAYS DID YOU NOT DO HOUSEWORK BECAUSE OF HEADACHES: 10
TOTAL SCORE: 40
HOW OFTEN WERE SOCIAL ACTIVITIES MISSED DUE TO HEADACHES: 3

## 2024-11-08 ENCOUNTER — OFFICE VISIT (OUTPATIENT)
Dept: NEUROLOGY | Facility: CLINIC | Age: 24
End: 2024-11-08
Attending: FAMILY MEDICINE
Payer: COMMERCIAL

## 2024-11-08 VITALS — DIASTOLIC BLOOD PRESSURE: 77 MMHG | OXYGEN SATURATION: 100 % | HEART RATE: 54 BPM | SYSTOLIC BLOOD PRESSURE: 114 MMHG

## 2024-11-08 DIAGNOSIS — G43.109 MIGRAINE WITH AURA AND WITHOUT STATUS MIGRAINOSUS, NOT INTRACTABLE: Primary | ICD-10-CM

## 2024-11-08 PROCEDURE — 99214 OFFICE O/P EST MOD 30 MIN: CPT

## 2024-11-08 PROCEDURE — G2211 COMPLEX E/M VISIT ADD ON: HCPCS

## 2024-11-08 RX ORDER — PROCHLORPERAZINE MALEATE 10 MG
10 TABLET ORAL EVERY 8 HOURS PRN
Qty: 20 TABLET | Refills: 3 | Status: SHIPPED | OUTPATIENT
Start: 2024-11-08

## 2024-11-08 RX ORDER — RIZATRIPTAN BENZOATE 5 MG/1
TABLET ORAL
Qty: 18 TABLET | Refills: 3 | Status: SHIPPED | OUTPATIENT
Start: 2024-11-08

## 2024-11-08 NOTE — NURSING NOTE
"Maria Alejandra Betts is a 24 year old female who presents for:  Chief Complaint   Patient presents with    Headache     Migraine without status migrainosus, not intractable, unspecified migraine type Ref   NdyajunKenzie cantor MD scheduled by patient records in epic           Initial Vitals:  /77   Pulse 54   LMP 09/21/2024 (Approximate)   SpO2 100%  Estimated body mass index is 21.71 kg/m  as calculated from the following:    Height as of 9/28/24: 1.753 m (5' 9\").    Weight as of 9/28/24: 66.7 kg (147 lb).. There is no height or weight on file to calculate BSA. BP completed using cuff size: regular  Data Unavailable    Nursing Comments:     Aishwarya Ribeiro MA   "
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
## 2024-11-11 ENCOUNTER — TELEPHONE (OUTPATIENT)
Dept: NEUROLOGY | Facility: CLINIC | Age: 24
End: 2024-11-11

## 2024-11-11 NOTE — TELEPHONE ENCOUNTER
Prior Authorization Retail Medication Request    Medication/Dose: Emgality 120mg/ml soaj  Diagnosis and ICD code (if different than what is on RX):    New/renewal/insurance change PA/secondary ins. PA:  Previously Tried and Failed:    Rationale:      Insurance   Primary: Eli FOURNIER  Insurance ID:  [id number]    Secondary (if applicable)  Insurance ID:      Pharmacy Information (if different than what is on RX)  Name:  [facility]  Phone:  [phone]  Fax:    Clinic Information  Preferred routing pool for dept communication:     Kelsi Giordano  Pharmacy Technician   [facility]  [phone]

## 2024-11-14 ENCOUNTER — TRANSFERRED RECORDS (OUTPATIENT)
Dept: HEALTH INFORMATION MANAGEMENT | Facility: CLINIC | Age: 24
End: 2024-11-14
Payer: COMMERCIAL

## 2024-11-14 NOTE — TELEPHONE ENCOUNTER
PA Initiation    Medication: EMGALITY 120 MG/ML SC SOAJ  Insurance Company: [employer] - Phone [phone] Fax [phone]  Pharmacy Filling the Rx: [facility] - [place], [place] - [address] [place] [address]  Filling Pharmacy Phone: [phone]  Filling Pharmacy Fax:    Start Date: 11/14/2024  Retail Pharmacy Prior Authorization Team   Phone: [phone]

## 2024-11-15 NOTE — TELEPHONE ENCOUNTER
Prior Authorization Approval    Medication: EMGALITY 120 MG/ML SC SOAJ  Authorization Effective Date: 11/13/2024  Authorization Expiration Date: 5/13/2025  Approved Dose/Quantity:   Reference #:     Insurance Company: [employer] - Phone [phone] Fax [phone]  Expected CoPay: $    CoPay Card Available:      Financial Assistance Needed: No  Which Pharmacy is filling the prescription: [facility] - [place], [place] - [address] [place] [address]  Pharmacy Notified: Yes  Patient Notified: The pharmacy will notify the patient.

## 2024-12-03 ENCOUNTER — OFFICE VISIT (OUTPATIENT)
Dept: OTHER | Facility: CLINIC | Age: 24
End: 2024-12-03
Payer: COMMERCIAL

## 2024-12-03 VITALS
BODY MASS INDEX: 21.86 KG/M2 | SYSTOLIC BLOOD PRESSURE: 104 MMHG | OXYGEN SATURATION: 100 % | WEIGHT: 148 LBS | HEART RATE: 68 BPM | DIASTOLIC BLOOD PRESSURE: 67 MMHG

## 2024-12-03 DIAGNOSIS — G43.909 MIGRAINE WITHOUT STATUS MIGRAINOSUS, NOT INTRACTABLE, UNSPECIFIED MIGRAINE TYPE: ICD-10-CM

## 2024-12-03 DIAGNOSIS — M79.601 PAIN OF RIGHT UPPER EXTREMITY: Primary | ICD-10-CM

## 2024-12-03 PROCEDURE — 99213 OFFICE O/P EST LOW 20 MIN: CPT | Performed by: INTERNAL MEDICINE

## 2024-12-03 PROCEDURE — 99205 OFFICE O/P NEW HI 60 MIN: CPT | Performed by: INTERNAL MEDICINE

## 2024-12-03 PROCEDURE — G2211 COMPLEX E/M VISIT ADD ON: HCPCS | Performed by: INTERNAL MEDICINE

## 2024-12-03 RX ORDER — SUMATRIPTAN 50 MG/1
50-100 TABLET, FILM COATED ORAL
Qty: 18 TABLET | Refills: 0 | Status: SHIPPED | OUTPATIENT
Start: 2024-12-03

## 2024-12-03 ASSESSMENT — PATIENT HEALTH QUESTIONNAIRE - PHQ9: SUM OF ALL RESPONSES TO PHQ QUESTIONS 1-9: 8

## 2024-12-03 NOTE — PROGRESS NOTES
[facility]        Patient is here for a consult to discuss TOS.    Pt is currently taking no meds that would impact our treatment plan.    /67 (BP Location: Right arm, Patient Position: Chair, Cuff Size: Adult Regular)   Pulse 68   Wt 148 lb (67.1 kg)   SpO2 100%   BMI 21.86 kg/m      The provider has been notified that the patient has no concerns.     Questions patient would like addressed today are: N/A.    Refills are needed: N/A    Has homecare services and agency name:  Raya Jimenez MA
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
## 2024-12-11 ENCOUNTER — MYC REFILL (OUTPATIENT)
Dept: NEUROLOGY | Facility: CLINIC | Age: 24
End: 2024-12-11
Payer: COMMERCIAL

## 2024-12-11 DIAGNOSIS — G43.109 MIGRAINE WITH AURA AND WITHOUT STATUS MIGRAINOSUS, NOT INTRACTABLE: ICD-10-CM

## 2024-12-12 NOTE — TELEPHONE ENCOUNTER
Last Written Prescription Date:  11/27/24  Last Fill Quantity: 1,  # refills: 11   Last office visit: 11/8/2024 ;  Future Office Visit:  1/31/25.    Should have refills on file.     JONAS Lopez, BSN  [facility]

## 2024-12-14 ENCOUNTER — OFFICE VISIT (OUTPATIENT)
Dept: URGENT CARE | Facility: URGENT CARE | Age: 24
End: 2024-12-14
Payer: COMMERCIAL

## 2024-12-14 VITALS
WEIGHT: 147 LBS | RESPIRATION RATE: 16 BRPM | SYSTOLIC BLOOD PRESSURE: 115 MMHG | HEIGHT: 69 IN | HEART RATE: 85 BPM | BODY MASS INDEX: 21.77 KG/M2 | OXYGEN SATURATION: 99 % | DIASTOLIC BLOOD PRESSURE: 83 MMHG | TEMPERATURE: 98.9 F

## 2024-12-14 DIAGNOSIS — J01.90 ACUTE BACTERIAL SINUSITIS: Primary | ICD-10-CM

## 2024-12-14 DIAGNOSIS — B96.89 ACUTE BACTERIAL SINUSITIS: Primary | ICD-10-CM

## 2024-12-14 LAB
DEPRECATED S PYO AG THROAT QL EIA: NEGATIVE
FLUAV RNA SPEC QL NAA+PROBE: NEGATIVE
FLUBV RNA RESP QL NAA+PROBE: NEGATIVE
RSV RNA SPEC NAA+PROBE: NEGATIVE
S PYO DNA THROAT QL NAA+PROBE: NOT DETECTED
SARS-COV-2 RNA RESP QL NAA+PROBE: NEGATIVE

## 2024-12-14 PROCEDURE — 87637 SARSCOV2&INF A&B&RSV AMP PRB: CPT | Performed by: STUDENT IN AN ORGANIZED HEALTH CARE EDUCATION/TRAINING PROGRAM

## 2024-12-14 PROCEDURE — 87651 STREP A DNA AMP PROBE: CPT | Performed by: STUDENT IN AN ORGANIZED HEALTH CARE EDUCATION/TRAINING PROGRAM

## 2024-12-14 PROCEDURE — 99213 OFFICE O/P EST LOW 20 MIN: CPT | Performed by: STUDENT IN AN ORGANIZED HEALTH CARE EDUCATION/TRAINING PROGRAM

## 2024-12-14 RX ORDER — AMOXICILLIN 875 MG/1
875 TABLET, COATED ORAL 2 TIMES DAILY
Qty: 14 TABLET | Refills: 0 | Status: SHIPPED | OUTPATIENT
Start: 2024-12-14 | End: 2024-12-21

## 2024-12-14 RX ORDER — FLUTICASONE PROPIONATE 50 MCG
1 SPRAY, SUSPENSION (ML) NASAL DAILY
Qty: 15.8 ML | Refills: 0 | Status: SHIPPED | OUTPATIENT
Start: 2024-12-14

## 2024-12-14 NOTE — PROGRESS NOTES
"Assessment & Plan     Acute bacterial sinusitis  Cough started about 2 weeks ago, then 3 to 4 days ago developed sore throat, sputum production, and now one day of body aches. Similar symptoms going around work. Concern for a superimposed bacterial sinusitis, will treat with amoxicillin and symptomatic treatments.   - Influenza A/B, RSV and SARS-CoV2 PCR (COVID-19)  - amoxicillin (AMOXIL) 875 MG tablet  Dispense: 14 tablet; Refill: 0  - sodium chloride (OCEAN) 0.65 % nasal spray  Dispense: 30 mL; Refill: 0  - fluticasone (FLONASE) 50 MCG/ACT nasal spray  Dispense: 15.8 mL; Refill: 0  - Influenza A/B, RSV and SARS-CoV2 PCR (COVID-19) Nasopharyngeal  - Discussed return precautions for worsening symptoms               No follow-ups on file.    Terrance Denton MD  [facility]    Kiesha Bess is a 24 year old female who presents to clinic today for the following health issues:  Chief Complaint   Patient presents with    Nasal Congestion     X2 weeks of congestion     Pharyngitis     X3 days of sore throat     Cough     X3 days of a cough   Some blood in mucus being coughed up     Generalized Body Aches     X1 day of body aches          Sore throat started about 4 days ago, also sputum output, green and brown with some blood.    No noted fevers, has not checked. No chills or shakes. No trouble breathing. Occasional sharp pain in ribs with deep breathing, not currently happening.     Has migraines, has had some intermittent headaches, not currently severe.  Does describe some neck pain.     A little nausea. No vomiting or diarrhea. No belly pain.    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     Some nasal congestion for about 2 weeks.    History of migraines on migraine medications. No surgical history. No known allergies to medications.           Review of Systems        Objective    /83   Pulse 85   Temp 98.9  F (37.2  C) (Temporal)   Resp 16   Ht 1.753 m (5' 9\")  "  Wt 66.7 kg (147 lb)   SpO2 99%   BMI 21.71 kg/m    Physical Exam   GENERAL: alert and no distress  EYES: Eyes grossly normal to inspection, PERRL and conjunctivae and sclerae normal  HENT: ear canals and TM's normal, nose and mouth without ulcers or lesions  NECK: no adenopathy, no asymmetry, masses, or scars  RESP: Cough present, lungs clear to auscultation - no rales, rhonchi or wheezes  CV: regular rate and rhythm, normal S1 S2, no S3 or S4, no murmur, click or rub, no peripheral edema  ABDOMEN: soft, nontender, no hepatosplenomegaly, no masses and bowel sounds normal  MS: no gross musculoskeletal defects noted, no edema  SKIN: no suspicious lesions or rashes  NEURO: Normal strength and tone, mentation intact and speech normal  PSYCH: mentation appears normal, affect normal/bright

## 2024-12-16 DIAGNOSIS — G43.109 MIGRAINE WITH AURA AND WITHOUT STATUS MIGRAINOSUS, NOT INTRACTABLE: ICD-10-CM

## 2024-12-16 NOTE — TELEPHONE ENCOUNTER
[facility] Call Center    Phone Message    May a detailed message be left on voicemail: yes     Reason for Call: Medication Question or concern regarding medication   Prescription Clarification  Name of Medication: EMGALITY) 120 MG/ML Prescribing Provider: Shantel Guerrero   Pharmacy: [facility]   What on the order needs clarification? Patient said pharmacy said this prescription has     Please call patient back with status      Action Taken: [facility]    Travel Screening: Not Applicable     Date of Service:

## 2024-12-16 NOTE — TELEPHONE ENCOUNTER
Pending Prescriptions:                       Disp   Refills    galcanezumab-gnlm (EMGALITY) 120 MG/ML in*1 mL   11           Sig: Inject 1 mL (120 mg) subcutaneously every 28           days.       Last Appt:11/8/2024  Next Appt:1/31/2025

## 2024-12-17 ENCOUNTER — TELEPHONE (OUTPATIENT)
Dept: NEUROLOGY | Facility: CLINIC | Age: 24
End: 2024-12-17
Payer: COMMERCIAL

## 2024-12-17 NOTE — TELEPHONE ENCOUNTER
Last filled 11/27/24 for 30 day with 11 refills.  Should have refills on file.     Called pharmacy and they said they have refills on file from 11/8 with 11 refills. They will put in the refill request.     Attempted to call pt to update but VM was full.     Jessica RN, BSN  [facility]

## 2024-12-19 ENCOUNTER — ANCILLARY PROCEDURE (OUTPATIENT)
Dept: GENERAL RADIOLOGY | Facility: CLINIC | Age: 24
End: 2024-12-19
Attending: STUDENT IN AN ORGANIZED HEALTH CARE EDUCATION/TRAINING PROGRAM
Payer: COMMERCIAL

## 2024-12-19 ENCOUNTER — OFFICE VISIT (OUTPATIENT)
Dept: URGENT CARE | Facility: URGENT CARE | Age: 24
End: 2024-12-19
Payer: COMMERCIAL

## 2024-12-19 VITALS
HEART RATE: 66 BPM | OXYGEN SATURATION: 98 % | TEMPERATURE: 97.7 F | SYSTOLIC BLOOD PRESSURE: 118 MMHG | DIASTOLIC BLOOD PRESSURE: 86 MMHG | WEIGHT: 142 LBS | BODY MASS INDEX: 20.97 KG/M2 | RESPIRATION RATE: 18 BRPM

## 2024-12-19 DIAGNOSIS — R05.1 ACUTE COUGH: Primary | ICD-10-CM

## 2024-12-19 DIAGNOSIS — R05.1 ACUTE COUGH: ICD-10-CM

## 2024-12-19 DIAGNOSIS — Z20.818 PERTUSSIS EXPOSURE: ICD-10-CM

## 2024-12-19 RX ORDER — AZITHROMYCIN 250 MG/1
TABLET, FILM COATED ORAL
Qty: 6 TABLET | Refills: 0 | Status: SHIPPED | OUTPATIENT
Start: 2024-12-19 | End: 2024-12-24

## 2024-12-19 RX ORDER — BENZONATATE 100 MG/1
100 CAPSULE ORAL 3 TIMES DAILY PRN
Qty: 30 CAPSULE | Refills: 0 | Status: SHIPPED | OUTPATIENT
Start: 2024-12-19

## 2024-12-19 NOTE — TELEPHONE ENCOUNTER
Retail Pharmacy Prior Authorization Team   Phone: [phone]    PA Initiation    Medication: EMGALITY 120 MG/ML SC SOAJ  Insurance Company: [employer] Non-Specialty PA's - Phone [phone] Fax [phone]  Pharmacy Filling the Rx: [facility] - [place], [place] - [address]  Filling Pharmacy Phone: [phone]  Filling Pharmacy Fax:    Start Date: 12/19/2024    COMPLETED CRITERIA QUESTIONS VIA EPA - PENDING DETERMINATION

## 2024-12-19 NOTE — LETTER
December 19, 2024      Maria Alejandra Betts  [address] [address]        To Whom It May Concern:    Maria Alejandra Betts  was seen on 12/19/24.  Please excuse her 12/19/24-12/23/24 due to illness.        Sincerely,        Bushra Linton PA-C

## 2024-12-19 NOTE — TELEPHONE ENCOUNTER
Prior Authorization Approval    Medication: EMGALITY 120 MG/ML SC SOAJ  Authorization Effective Date: 11/19/2024  Authorization Expiration Date: 12/19/2025  Insurance Company: [employer] Non-Specialty PA's - Phone [phone] Fax [phone]  Which Pharmacy is filling the prescription: [facility] - [place], [place] - [address] [place] [address]  Pharmacy Notified: YES  Patient Notified: YES (faxed approval letter to pharmacy and notified patient via mychart message)    INSURANCE ONLY COVERS 1ML PER 30 DAYS

## 2024-12-19 NOTE — PATIENT INSTRUCTIONS
Xray shows no pneumonia.  You should quarantine until pertussis test is back - if positive, quarantine until you finish antibiotics. If negative, you are not contagious.  Tessalon as needed for cough.   Lots of rest, fluids, humidifier in room.

## 2024-12-19 NOTE — PROGRESS NOTES
ASSESSMENT & PLAN:   Diagnoses and all orders for this visit:  Acute cough  -     B. pertussis/parapertussis PCR-NP  -     XR Chest 2 Views; Future  -     benzonatate (TESSALON) 100 MG capsule; Take 1 capsule (100 mg) by mouth 3 times daily as needed for cough.  Pertussis exposure  -     azithromycin (ZITHROMAX) 250 MG tablet; Take 2 tablets (500 mg) by mouth daily for 1 day, THEN 1 tablet (250 mg) daily for 4 days.    Cough x 1 week. Vitals stable, clear lung sounds.  Chest XR negative for acute infiltrate  Pertussis exposure at work - elect to treat empirically with Zpack. Discussed that if pertussis test is negative, may stop Zpack and just treat conservatively.  Supportive treatment with tessalon as needed.    At the end of the encounter, I discussed results, diagnosis, medications. Discussed red flags for immediate return to clinic/ER, as well as indications for follow up if no improvement. Patient and/or caregiver understood and agreed to plan. Patient was stable for discharge.    Patient Instructions   Xray shows no pneumonia.  You should quarantine until pertussis test is back - if positive, quarantine until you finish antibiotics. If negative, you are not contagious.  Tessalon as needed for cough.   Lots of rest, fluids, humidifier in room.     Return in about 5 days (around 12/24/2024) for symptoms not improving, sooner if needed.    ------------------------------------------------------------------------  SUBJECTIVE  History was obtained from patient.    Patient presents with:  Urgent Care: Sick since last Wednesday, tested for a lot last Saturday but not pertussis and has been exposed.     HPI  Maria Alejandra Betts is a(n) 24 year old female presenting to urgent care for cough x 1 week. Cough is productive with mucus. Reports ongoing sore throat, rhinorrhea, tactile fever.  Seen in urgent care 5 days ago -negative strep, influenza, RSV, COVID testing. Was treated with amoxicillin for sinusitis but she did not  take it. 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    Review of Systems    Current Outpatient Medications   Medication Sig Dispense Refill    albuterol (PROAIR HFA/PROVENTIL HFA/VENTOLIN HFA) 108 (90 Base) MCG/ACT inhaler Inhale 2 puffs into the lungs every 4 hours as needed for shortness of breath, wheezing or cough. 18 g 0    azithromycin (ZITHROMAX) 250 MG tablet Take 2 tablets (500 mg) by mouth daily for 1 day, THEN 1 tablet (250 mg) daily for 4 days. 6 tablet 0    benzonatate (TESSALON) 100 MG capsule Take 1 capsule (100 mg) by mouth 3 times daily as needed for cough. 30 capsule 0    norethindrone (MICRONOR) 0.35 MG tablet Take 1 tablet (0.35 mg) by mouth daily 90 tablet 3    amoxicillin (AMOXIL) 875 MG tablet Take 1 tablet (875 mg) by mouth 2 times daily for 7 days. 14 tablet 0    fluticasone (FLONASE) 50 MCG/ACT nasal spray Spray 1 spray into both nostrils daily. (Patient not taking: Reported on 12/19/2024) 15.8 mL 0    galcanezumab-gnlm (EMGALITY) 120 MG/ML injection Inject 1 mL (120 mg) subcutaneously every 28 days. 1 mL 11    prochlorperazine (COMPAZINE) 10 MG tablet Take 1 tablet (10 mg) by mouth every 8 hours as needed for nausea (migraine). 20 tablet 3    propranolol (INDERAL) 40 MG tablet Take 1 tablet (40 mg) by mouth 3 times daily Then we can try 60mg extended release 90 tablet 0    rizatriptan (MAXALT) 5 MG tablet Take 1-2 tablet(s) (5-10 mg) by mouth at onset of headache for migraine. May repeat in 2 hours. Max 6 tablets/24 hours. 18 tablet 3    sodium chloride (OCEAN) 0.65 % nasal spray Intranasal spray 1-2 times per day for up to 7 days 30 mL 0    SUMAtriptan (IMITREX) 50 MG tablet Take 1-2 tablets ( mg) by mouth at onset of headache for migraine. May repeat in 2 hours. Max 4 tablets/24 hours. 18 tablet 0     Problem List:  2024-06: Generalized anxiety disorder  2024-06: Migraine without status migrainosus, not intractable,   unspecified migraine type  2023-11: MDD (major depressive  disorder), recurrent episode, moderate   (H)  2023-11: Major depressive disorder, recurrent episode, moderate (H)  2023-02: Menorrhagia with irregular cycle  2023-02: BRUCE (generalized anxiety disorder)  2023-02: Depression, unspecified depression type  2023-01: Borderline personality disorder (H)  2021-07: Migraine without aura and without status migrainosus, not   intractable  2021-07: Migraine with aura and without status migrainosus, not   intractable    Allergies   Allergen Reactions    Seasonal Allergies          OBJECTIVE  Vitals:    12/19/24 1233   BP: 118/86   Pulse: 66   Resp: 18   Temp: 97.7  F (36.5  C)   TempSrc: Temporal   SpO2: 98%   Weight: 64.4 kg (142 lb)     Physical Exam   GENERAL: healthy, alert, no acute distress.   PSYCH: mentation appears normal. Normal affect  HEAD: normocephalic, atraumatic.  EYE: PERRL. EOMs intact. No scleral injection bilaterally.   EAR: external ear normal. Bilateral ear canals normal and nonpainful. Bilateral TM intact, pearly, translucent without bulging.  NOSE: external nose atraumatic without lesions.  OROPHARYNX: moist mucous membranes. Posterior oropharynx with mild erythema. No exudate. Uvula midline. Patent airway.  LUNGS: no increased work of breathing. Clear lung sounds bilaterally. No wheezing, rhonchi, or rales.   CV: regular rate and rhythm. No clicks, murmurs, or rubs.    Xrays were preliminarily reviewed by me - no acute infiltrate.     Results for orders placed or performed in visit on 12/19/24   XR Chest 2 Views     Status: None    Narrative    CHEST TWO VIEWS December 19, 2024 1:19 PM     HISTORY: Cough for one week, pneumonia exposure. Acute cough.    COMPARISON: September 28, 2024.       Impression    IMPRESSION: There are no acute infiltrates. The cardiac silhouette is  not enlarged. Pulmonary vasculature is unremarkable.    LEEROY RODRIGUEZ MD         SYSTEM ID:  [id number]

## 2024-12-20 ENCOUNTER — TRANSFERRED RECORDS (OUTPATIENT)
Dept: HEALTH INFORMATION MANAGEMENT | Facility: CLINIC | Age: 24
End: 2024-12-20
Payer: COMMERCIAL

## 2024-12-23 ENCOUNTER — TELEPHONE (OUTPATIENT)
Dept: NEUROLOGY | Facility: CLINIC | Age: 24
End: 2024-12-23
Payer: COMMERCIAL

## 2024-12-23 NOTE — TELEPHONE ENCOUNTER
Prior Authorization Approval    Medication: EMGALITY 120 MG/ML SC SOAJ  Authorization Effective Date: 12/23/2024  Authorization Expiration Date: 1/23/2025  Reference #: [id number]   Insurance Company: [employer] Non-Specialty PA's - Phone [phone] Fax [phone]  Which Pharmacy is filling the prescription: [facility] - [place], [place] - [address] [place] [address]  Pharmacy Notified: YES  Patient Notified: YES - called pharmacy to notify of approval and they will notify the patient when ready    Sheree provided me with an immediate approval - provided case# and approval dates.

## 2024-12-23 NOTE — TELEPHONE ENCOUNTER
Retail Pharmacy Prior Authorization Team   Phone: [phone]    PA Initiation    Medication: EMGALITY 120 MG/ML SC SOAJ  Insurance Company: [employer] Non-Specialty PA's - Phone [phone] Fax [phone]  Pharmacy Filling the Rx: [facility] - [place], [place] - [address]  Filling Pharmacy Phone: [phone]  Filling Pharmacy Fax:    Start Date: 12/23/2024    COMPLETED CRITERIA QUESTIONS OVER THE PHONE WITH A[facility] AT [facility]

## 2025-01-06 ENCOUNTER — VIRTUAL VISIT (OUTPATIENT)
Dept: OTHER | Facility: CLINIC | Age: 25
End: 2025-01-06
Attending: INTERNAL MEDICINE
Payer: COMMERCIAL

## 2025-01-06 DIAGNOSIS — M79.601 PAIN OF RIGHT UPPER EXTREMITY: ICD-10-CM

## 2025-01-06 PROCEDURE — 98006 SYNCH AUDIO-VIDEO EST MOD 30: CPT | Performed by: INTERNAL MEDICINE

## 2025-01-06 NOTE — PROGRESS NOTES
Maria Alejandra is a 24 year old who is being evaluated via a billable video visit.    How would you like to obtain your AVS? MyChart  If the video visit is dropped, the invitation should be resent by: Text to cell phone: [phone]  Will anyone else be joining your video visit? Raya Rizzo MA
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
## 2025-01-13 ENCOUNTER — TELEPHONE (OUTPATIENT)
Dept: DERMATOLOGY | Facility: CLINIC | Age: 25
End: 2025-01-13
Payer: COMMERCIAL

## 2025-01-13 NOTE — TELEPHONE ENCOUNTER
Online Appointment Request     [facility] Call Center  Phone Message      Reason for Call: Appointment Intake     Patients Requests:    Reason for appointment  I have a new rash on elbows that itches, with an unknown cause. I have been questioning the possibility of having celiac disease based on other symptoms I've had as well  Preferred Provider  Ami Goldsmith  Preferred Location  [facility]   Preferred Visit Type  In-person        Action taken: Other: none; Documenting patient was called; sent [employer] message for patient to call back.

## 2025-01-14 ENCOUNTER — TELEPHONE (OUTPATIENT)
Dept: PHYSICAL THERAPY | Facility: CLINIC | Age: 25
End: 2025-01-14
Payer: COMMERCIAL

## 2025-01-23 ENCOUNTER — THERAPY VISIT (OUTPATIENT)
Dept: PHYSICAL THERAPY | Facility: CLINIC | Age: 25
End: 2025-01-23
Attending: INTERNAL MEDICINE
Payer: COMMERCIAL

## 2025-01-23 DIAGNOSIS — M35.7 HYPERMOBILITY SYNDROME: Primary | ICD-10-CM

## 2025-01-23 PROCEDURE — 97530 THERAPEUTIC ACTIVITIES: CPT | Mod: GP | Performed by: PHYSICAL THERAPIST

## 2025-01-23 PROCEDURE — 97140 MANUAL THERAPY 1/> REGIONS: CPT | Mod: GP | Performed by: PHYSICAL THERAPIST

## 2025-01-23 PROCEDURE — 97162 PT EVAL MOD COMPLEX 30 MIN: CPT | Mod: GP | Performed by: PHYSICAL THERAPIST

## 2025-01-23 NOTE — PROGRESS NOTES
PHYSICAL THERAPY EVALUATION  Type of Visit: Evaluation       Fall Risk Screen:  Fall screen completed by: PT  Have you fallen 2 or more times in the past year?: No  Have you fallen and had an injury in the past year?: No  Is patient a fall risk?: No    Subjective Was Dx'd with neruogenic thoracic outlet with scalene injection which was effective with numbing agent but steroid did not help and now she has the same to perhaps more intense pain and slightly different pain than she did before the injection. Now is getting pain in arm pit.   Currently has shooting pain into R arm pit.   Hx of fainting and getting light headed, prolonged standing will do it.     Alleviating factors: hot shower, massage, heat pad  Aggravating factors lifting or reaching out in front of body . Texting or playing [employer] . Any repetitive motion with arms (dishes or tother items) lying on the R side also aggravate it.         Presenting condition or subjective complaint: I have been diagnosed with Neurogenic thoracic outlet syndrome in the right arm/shoulder. Not able to do many things anymore.  Date of onset: 01/06/25    Relevant medical history: Depression; Mental Illness; Migraines or headaches   Dates & types of surgery: 0    Prior diagnostic imaging/testing results: CT scan; X-ray     Prior therapy history for the same diagnosis, illness or injury: No      Prior Level of Function  Transfers: Independent  Ambulation: Independent  ADL: Independent  IADL:  indep    Living Environment  Social support: With a significant other or spouse   Type of home: Apartment/condo   Stairs to enter the home: No       Ramp: No   Stairs inside the home: No       Help at home: Self Cares (home health aide/personal care attendant, family, etc); Home management tasks (cooking, cleaning)  Equipment owned:       Employment: Yes   Hobbies/Interests: Playing my [employer] switch and singing    Patient goals for therapy: I would like to be able  to do basic things like taking out the trash or lifting my laundry basket. I would also like to be able to be fully mobile again at work.    Pain assessment: Pain present Feels like arm is achey compressed shooting pain up into neck and down into finger tips,. Different fingers affected at different times lots of pain in shoulder (scapula feels almost bruised and into posterior arm pit)      Worst pain 9/10 typically with when she works for the full day or after doing chores at home  Best pain 5/10 pain at rest when she really does not do much at all.      Objective      Cognitive Status Examination  Orientation: Oriented to person, place and time   Level of Consciousness: Alert  Follows Commands and Answers Questions: 100% of the time  Personal Safety and Judgement: Intact  Memory: Intact    OBSERVATION: NAD  INTEGUMENTARY: Intact  POSTURE: WFL  PALPATION: not fully assessed this date.  STRENGTH:   strength R 24kg (dom)  18 kg on L     BED MOBILITY: Independent    TRANSFERS: Independent    GAIT:   Level of [place]: Independent  Assistive Device(s): None  Gait Deviations: WFL  Gait Distance: 20'   Stairs: NT    BALANCE:  Sitting balance is independent. Standing and dynamic balance not fully assessed this date. Will continue to assess PRN    SENSATION:  NT    REFLEXES: NT  COORDINATION: WNL  MUSCLE TONE: WNL    CERVICAL SPINE EVALUATION    ROM:   (Degrees) Left AROM Right AROM    Cervical Flexion 60 deg (pain shoots to fingers)    Cervical Extension 53    Cervical Side bend 30 30    Cervical Rotation 50 (slight t/n L UE to fingers)  49    Shoulder ROM grossly WNL        MYOTOMES: WNL  CORD SIGNS:  Negative  NEURAL TENSION:  Not fully assessed this date will continue to assess.    FLEXIBILITY:   Beighton Hypermobility Scale 1/23/2025   B elbow (2) 0   B knees (2) 2   B thumbs (2) 2   B small finger HE (2) 2   Bend and touch floor with flat palms (1) 0   Score: (0-9) 6        SPECIAL TESTS:    Left Right  "  Alar Ligament Negative    Cervical Flexion-Rotation     Cervical Rot/Lateral Flex Positive tight PositiveT/N in R UE as soon as Rot L   Spurling s Positive Positive   Thoracic Outlet Screen (Faviola Fink) Negative  Positive   Transverse Ligament Negative  Negative    Vertebral Artery Dizzy in thinker pose better on this side  Dizzy in thinker pose very quickly     DIZZINESS: Brief discussion about this. Largely lightheaded in nature,  \"normal\" for them.  Not new, and no change to sx recently.  Prolonged standing will set it off. Will perform active stand test at next visit and continue assessment as appropriate.  BP noted to be grossly 115-118/80's at last 2 December MD visits.     Assessment & Plan   CLINICAL IMPRESSIONS  Medical Diagnosis: neruogenic thoracic outlet    Treatment Diagnosis: Force production deficit, Hypermobility   Impression/Assessment: Patient is a 24 year old female with R UE pain consistent with thoracic outlet and hypermobility complaints.  The following significant findings have been identified: Pain, Decreased strength, Decreased proprioception, Inflammation, Impaired muscle performance, Decreased activity tolerance, Instability, and Dizziness. These impairments interfere with their ability to perform self care tasks, work tasks, recreational activities, household chores, and driving  as compared to previous level of function.     Clinical Decision Making (Complexity):  Clinical Presentation: Evolving/Changing  Clinical Presentation Rationale: based on medical and personal factors listed in PT evaluation  Clinical Decision Making (Complexity): Moderate complexity    PLAN OF CARE  Treatment Interventions:  Interventions: Manual Therapy, Neuromuscular Re-education, Therapeutic Activity, Therapeutic Exercise, Self-Care/Home Management    Long Term Goals     PT Goal 1  Goal Identifier: BIoH Questionnaire  Goal Description: Pt will demonstrate a 10% improvement in their BIoH score to indicate " improved QOL and function.  Goal Progress: Not completely filled out this date, reassigned for second visit  Target Date: 04/23/25  PT Goal 2  Goal Identifier: Work  Goal Description: Pt will be able to lift 30lbs in both hands with no increased pain in neck or R UE during or after in order to fully participate in her work duties.  Target Date: 04/23/25  PT Goal 3  Goal Identifier: Home management  Goal Description: Pt will report able to carry the laundry or take out the trash with no  increased pain in neck or R UE.  Target Date: 04/23/25  PT Goal 4  Goal Identifier: HEP  Goal Description: Pt will be indep with HEP for upper quater stability and strength as well as whole body stabilization program to prevent recurrance due to hypermobility  Target Date: 04/23/25      Frequency of Treatment: 1x/2 weeks  Duration of Treatment: 90 days    Recommended Referrals to Other Professionals:  OT for hand therapy and possibly ring splints also demonstrates pes planus and knee hyper extn, so will benefit from custom ankle foot orthotics and foot orthotics to manage hind foot.  May also benefit from follow up with primary MD due to PT findings of hypermobility.  Education Assessment:   Learner/Method: Patient    Risks and benefits of evaluation/treatment have been explained.   Patient/Family/caregiver agrees with Plan of Care.     Evaluation Time:     PT Eval, Moderate Complexity Minutes (41755): 45       Signing Clinician: Darlene Abebe, PT        [facility]                                                                                   OUTPATIENT PHYSICAL THERAPY      PLAN OF TREATMENT FOR OUTPATIENT REHABILITATION   Patient's Last Name, First Name, Maria lAejandra Jaime YOB: 2000   Provider's Name   [facility]   Medical Record No.  [id number]     Onset Date: 01/06/25  Start of Care Date: 01/23/25     Medical Diagnosis:  neruogenic  thoracic outlet      PT Treatment Diagnosis:  Force production deficit, Hypermobility Plan of Treatment  Frequency/Duration: 1x/2 weeks/ 90 days    Certification date from 01/23/25 to 04/23/25         See note for plan of treatment details and functional goals     Darlene Abebe, PT                         I CERTIFY THE NEED FOR THESE SERVICES FURNISHED UNDER        THIS PLAN OF TREATMENT AND WHILE UNDER MY CARE     (Physician attestation of this document indicates review and certification of the therapy plan).              Referring Provider:  Niko Blunt  CC: primary  Kenzie Walker MD      Initial Assessment  See Epic Evaluation- Start of Care Date: 01/23/25

## 2025-02-03 ENCOUNTER — THERAPY VISIT (OUTPATIENT)
Dept: PHYSICAL THERAPY | Facility: CLINIC | Age: 25
End: 2025-02-03
Attending: INTERNAL MEDICINE
Payer: COMMERCIAL

## 2025-02-03 DIAGNOSIS — M35.7 HYPERMOBILITY SYNDROME: Primary | ICD-10-CM

## 2025-02-03 PROCEDURE — 97750 PHYSICAL PERFORMANCE TEST: CPT | Mod: GP | Performed by: PHYSICAL THERAPIST

## 2025-02-03 PROCEDURE — 97110 THERAPEUTIC EXERCISES: CPT | Mod: GP | Performed by: PHYSICAL THERAPIST

## 2025-02-03 NOTE — PATIENT INSTRUCTIONS
Dysautonomia Screening - Active Stand Test    Body Position (Time) Blood Pressure Heart Rate Comments   Supine (3 min) 111/72 59 No symptoms   Supine (5 min) 110/69 63 No symptoms    Transition to quiet standing at bedside -------- ------ ------   Standing (1 min) 116/83 95 5/10 light headedness    Standing (3 min) 115/86 107 Feels like heart is racing. Dizziness 6/10   Standing (5 min) 121/85 97 Chest feels tight; dizziness the same    Standing (7 min) OPTIONAL 119/82 103 Feels like she's getting a heat flash, Dizziness 7-8/10    Supine resting at 12 min5  3 No dizziness      Orthostatic Hypotension - BP reduction of >20mmHg SBP or >10 mmHg DBP within the first 3 minutes of upright posture    Postural Orthostatic Tachycardia Syndrome (POTS) is likely if:   30+bpm increase from baseline HR for adults, OR   40+bpm increase in 12-20yo from supine   WITHOUT >20mmHg drop in SBP or >10 point drop in DBP

## 2025-02-11 ENCOUNTER — DOCUMENTATION ONLY (OUTPATIENT)
Dept: OTHER | Facility: CLINIC | Age: 25
End: 2025-02-11

## 2025-02-11 ENCOUNTER — OFFICE VISIT (OUTPATIENT)
Dept: URGENT CARE | Facility: URGENT CARE | Age: 25
End: 2025-02-11
Payer: COMMERCIAL

## 2025-02-11 ENCOUNTER — ANCILLARY PROCEDURE (OUTPATIENT)
Dept: GENERAL RADIOLOGY | Facility: CLINIC | Age: 25
End: 2025-02-11
Attending: NURSE PRACTITIONER
Payer: COMMERCIAL

## 2025-02-11 VITALS
RESPIRATION RATE: 20 BRPM | DIASTOLIC BLOOD PRESSURE: 88 MMHG | HEART RATE: 68 BPM | OXYGEN SATURATION: 97 % | BODY MASS INDEX: 21.83 KG/M2 | WEIGHT: 147.8 LBS | TEMPERATURE: 97.2 F | SYSTOLIC BLOOD PRESSURE: 126 MMHG

## 2025-02-11 DIAGNOSIS — R05.1 ACUTE COUGH: Primary | ICD-10-CM

## 2025-02-11 DIAGNOSIS — M35.7 HYPERMOBILITY SYNDROME: Primary | ICD-10-CM

## 2025-02-11 DIAGNOSIS — J10.1 INFLUENZA A: ICD-10-CM

## 2025-02-11 DIAGNOSIS — R05.1 ACUTE COUGH: ICD-10-CM

## 2025-02-11 DIAGNOSIS — R50.9 FEVER AND CHILLS: ICD-10-CM

## 2025-02-11 DIAGNOSIS — R19.7 DIARRHEA, UNSPECIFIED TYPE: ICD-10-CM

## 2025-02-11 DIAGNOSIS — R09.81 CONGESTION OF PARANASAL SINUS: ICD-10-CM

## 2025-02-11 LAB
FLUAV AG SPEC QL IA: POSITIVE
FLUBV AG SPEC QL IA: NEGATIVE

## 2025-02-11 PROCEDURE — 71046 X-RAY EXAM CHEST 2 VIEWS: CPT | Mod: TC | Performed by: RADIOLOGY

## 2025-02-11 PROCEDURE — 87804 INFLUENZA ASSAY W/OPTIC: CPT | Performed by: NURSE PRACTITIONER

## 2025-02-11 PROCEDURE — 99214 OFFICE O/P EST MOD 30 MIN: CPT | Performed by: NURSE PRACTITIONER

## 2025-02-11 ASSESSMENT — ENCOUNTER SYMPTOMS
CHILLS: 1
SINUS PRESSURE: 1
SORE THROAT: 1
COUGH: 1
FEVER: 1
RHINORRHEA: 1
WHEEZING: 1
FATIGUE: 1

## 2025-02-11 NOTE — LETTER
February 11, 2025      Maria Alejandra Betts  [address] [address]        To Whom It May Concern:    Maria Alejandra Betts  was seen on 2/11/2025.  Please excuse her  until 24 hours fever free and symptoms improving.           Sincerely,        ALEAH RICKS CNP    Electronically signed

## 2025-02-11 NOTE — PROGRESS NOTES
Assessment & Plan       ICD-10-CM    1. Acute cough  R05.1 Influenza A/B antigen     XR Chest 2 Views      2. Fever and chills  R50.9 Influenza A/B antigen      3. Congestion of paranasal sinus  R09.81 Influenza A/B antigen      4. Diarrhea  R19.7 Influenza A/B antigen           Patient instructions:  Your flu test came back positive. The flu is a viral illness that should improve with time. Typically symptoms last for 5-7 days. Please use tylenol / ibuprofen as needed for fever / pain control. Drink lots of fluids and if symptoms worsen please be seen in person for further evaluation. Please stay home until 24 hours fever free without the use of fever reducing medications.       Medical decision making:  Pt presents with fever, chills, sore throat, cough and shortness of breath x 2 days. Ddx includes pneumonia, URI, flu, covid, among others. Chest xray negative per my read, awaiting final read by radiology. Flu did come back positive. Discussed with patient that flu is positive, length of course of illness and return precautions. Out of window for tamiflu as symptoms have been ongoing for over 48 hours.     Results for orders placed or performed in visit on 02/11/25   XR Chest 2 Views     Status: None    Narrative    EXAM: XR CHEST 2 VIEWS  LOCATION: [facility]  DATE: 2/11/2025    INDICATION:  Acute cough  COMPARISON: 9/28/2024      Impression    IMPRESSION: No acute cardiopulmonary abnormality.   Results for orders placed or performed in visit on 02/11/25   Influenza A/B antigen     Status: Abnormal    Specimen: Nose; Swab   Result Value Ref Range    Influenza A antigen Positive (A) Negative    Influenza B antigen Negative Negative    Narrative    Test results must be correlated with clinical data. If necessary, results should be confirmed by a molecular assay or viral culture.         No follow-ups on file.    At the end of the encounter, I discussed results, diagnosis, medications. Discussed  red flags for immediate return to clinic/ER, as well as indications for follow up if no improvement. Patient understood and agreed to plan. Patient was stable for discharge.    Kiesha Bess is a 24 year old female who presents to clinic today the following health issues:  Chief Complaint   Patient presents with    Urgent Care     Pt presents cough, fever, nasal congestion, night sweats, diarrhea, onset 2 days. Pt took home covid test 2/9, was negative.      Pt reports congestion, cough, shortness of breath, fever and chills that started 2 days ago without improvement. Speaking in full sentences. 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             Review of Systems   Constitutional:  Positive for chills, fatigue and fever.   HENT:  Positive for congestion, rhinorrhea, sinus pressure and sore throat.    Respiratory:  Positive for cough and wheezing.        Problem List:  2024-06: Generalized anxiety disorder  2024-06: Migraine without status migrainosus, not intractable,   unspecified migraine type  2023-11: MDD (major depressive disorder), recurrent episode, moderate   (H)  2023-11: Major depressive disorder, recurrent episode, moderate (H)  2023-02: Menorrhagia with irregular cycle  2023-02: BRUCE (generalized anxiety disorder)  2023-02: Depression, unspecified depression type  2023-01: Borderline personality disorder (H)  2021-07: Migraine without aura and without status migrainosus, not   intractable  2021-07: Migraine with aura and without status migrainosus, not   intractable      Past Medical History:   Diagnosis Date    Depressive disorder 2019       Social History     Tobacco Use    Smoking status: Every Day     Types: Vaping Device    Smokeless tobacco: Never   Substance Use Topics    Alcohol use: Yes     Comment: occ           Objective    /88   Pulse 68   Temp 97.2  F (36.2  C) (Tympanic)   Resp 20   Wt 67 kg (147 lb 12.8 oz)   SpO2 97%   BMI 21.83 kg/m     Physical Exam  Constitutional:       General: She is not in acute distress.     Appearance: Normal appearance. She is not ill-appearing, toxic-appearing or diaphoretic.   HENT:      Head: Normocephalic and atraumatic.      Right Ear: Tympanic membrane, ear canal and external ear normal.      Left Ear: Tympanic membrane, ear canal and external ear normal.      Nose: Congestion present.      Mouth/Throat:      Mouth: Mucous membranes are moist.      Pharynx: No oropharyngeal exudate or posterior oropharyngeal erythema.   Cardiovascular:      Rate and Rhythm: Normal rate and regular rhythm.   Pulmonary:      Effort: Pulmonary effort is normal.      Breath sounds: Examination of the right-lower field reveals decreased breath sounds. Examination of the left-lower field reveals decreased breath sounds. Decreased breath sounds present.   Lymphadenopathy:      Cervical: No cervical adenopathy.   Neurological:      Mental Status: She is alert.              ALEAH RICKS CNP

## 2025-02-11 NOTE — Clinical Note
2025    Maria Alejandra Betts   2000        To Whom it May Concern;    Please excuse Maria Alejandra Betts from work/school for a healthcare visit on 2025.    Sincerely,        ALEAH RICKS CNP

## 2025-02-17 ENCOUNTER — THERAPY VISIT (OUTPATIENT)
Dept: PHYSICAL THERAPY | Facility: CLINIC | Age: 25
End: 2025-02-17
Attending: INTERNAL MEDICINE
Payer: COMMERCIAL

## 2025-02-17 DIAGNOSIS — M35.7 HYPERMOBILITY SYNDROME: Primary | ICD-10-CM

## 2025-02-17 PROCEDURE — 97140 MANUAL THERAPY 1/> REGIONS: CPT | Mod: GP | Performed by: PHYSICAL THERAPIST

## 2025-02-17 PROCEDURE — 97110 THERAPEUTIC EXERCISES: CPT | Mod: GP | Performed by: PHYSICAL THERAPIST

## 2025-02-27 ENCOUNTER — THERAPY VISIT (OUTPATIENT)
Dept: PHYSICAL THERAPY | Facility: CLINIC | Age: 25
End: 2025-02-27
Attending: INTERNAL MEDICINE
Payer: COMMERCIAL

## 2025-02-27 ENCOUNTER — HOSPITAL ENCOUNTER (EMERGENCY)
Facility: CLINIC | Age: 25
Discharge: HOME | End: 2025-02-27
Attending: EMERGENCY MEDICINE | Admitting: EMERGENCY MEDICINE
Payer: COMMERCIAL

## 2025-02-27 ENCOUNTER — APPOINTMENT (OUTPATIENT)
Dept: CT IMAGING | Facility: CLINIC | Age: 25
End: 2025-02-27
Attending: EMERGENCY MEDICINE
Payer: COMMERCIAL

## 2025-02-27 VITALS
DIASTOLIC BLOOD PRESSURE: 95 MMHG | OXYGEN SATURATION: 100 % | SYSTOLIC BLOOD PRESSURE: 134 MMHG | TEMPERATURE: 96.9 F | RESPIRATION RATE: 18 BRPM | HEART RATE: 57 BPM

## 2025-02-27 DIAGNOSIS — M54.2 NECK PAIN: ICD-10-CM

## 2025-02-27 DIAGNOSIS — R51.9 NONINTRACTABLE HEADACHE, UNSPECIFIED CHRONICITY PATTERN, UNSPECIFIED HEADACHE TYPE: ICD-10-CM

## 2025-02-27 DIAGNOSIS — M35.7 HYPERMOBILITY SYNDROME: Primary | ICD-10-CM

## 2025-02-27 LAB
ANION GAP SERPL CALCULATED.3IONS-SCNC: 8 MMOL/L (ref 7–15)
BASOPHILS # BLD AUTO: 0 10E3/UL (ref 0–0.2)
BASOPHILS NFR BLD AUTO: 1 %
BUN SERPL-MCNC: 7 MG/DL (ref 6–20)
CALCIUM SERPL-MCNC: 9.2 MG/DL (ref 8.8–10.4)
CHLORIDE SERPL-SCNC: 104 MMOL/L (ref 98–107)
CREAT SERPL-MCNC: 0.8 MG/DL (ref 0.51–0.95)
EGFRCR SERPLBLD CKD-EPI 2021: >90 ML/MIN/1.73M2
EOSINOPHIL # BLD AUTO: 0.2 10E3/UL (ref 0–0.7)
EOSINOPHIL NFR BLD AUTO: 3 %
ERYTHROCYTE [DISTWIDTH] IN BLOOD BY AUTOMATED COUNT: 13.4 % (ref 10–15)
FLUAV RNA SPEC QL NAA+PROBE: NEGATIVE
FLUBV RNA RESP QL NAA+PROBE: NEGATIVE
GLUCOSE SERPL-MCNC: 84 MG/DL (ref 70–99)
HCG SERPL QL: NEGATIVE
HCO3 SERPL-SCNC: 26 MMOL/L (ref 22–29)
HCT VFR BLD AUTO: 40.7 % (ref 35–47)
HGB BLD-MCNC: 13.1 G/DL (ref 11.7–15.7)
IMM GRANULOCYTES # BLD: 0 10E3/UL
IMM GRANULOCYTES NFR BLD: 0 %
LYMPHOCYTES # BLD AUTO: 1.5 10E3/UL (ref 0.8–5.3)
LYMPHOCYTES NFR BLD AUTO: 27 %
MCH RBC QN AUTO: 27.1 PG (ref 26.5–33)
MCHC RBC AUTO-ENTMCNC: 32.2 G/DL (ref 31.5–36.5)
MCV RBC AUTO: 84 FL (ref 78–100)
MONOCYTES # BLD AUTO: 0.4 10E3/UL (ref 0–1.3)
MONOCYTES NFR BLD AUTO: 7 %
NEUTROPHILS # BLD AUTO: 3.6 10E3/UL (ref 1.6–8.3)
NEUTROPHILS NFR BLD AUTO: 63 %
NRBC # BLD AUTO: 0 10E3/UL
NRBC BLD AUTO-RTO: 0 /100
PLATELET # BLD AUTO: 329 10E3/UL (ref 150–450)
POTASSIUM SERPL-SCNC: 3.9 MMOL/L (ref 3.4–5.3)
RBC # BLD AUTO: 4.83 10E6/UL (ref 3.8–5.2)
RSV RNA SPEC NAA+PROBE: NEGATIVE
SARS-COV-2 RNA RESP QL NAA+PROBE: NEGATIVE
SODIUM SERPL-SCNC: 138 MMOL/L (ref 135–145)
WBC # BLD AUTO: 5.8 10E3/UL (ref 4–11)

## 2025-02-27 PROCEDURE — 36415 COLL VENOUS BLD VENIPUNCTURE: CPT | Performed by: EMERGENCY MEDICINE

## 2025-02-27 PROCEDURE — 96374 THER/PROPH/DIAG INJ IV PUSH: CPT | Mod: 59

## 2025-02-27 PROCEDURE — 250N000011 HC RX IP 250 OP 636: Performed by: EMERGENCY MEDICINE

## 2025-02-27 PROCEDURE — 82565 ASSAY OF CREATININE: CPT | Performed by: EMERGENCY MEDICINE

## 2025-02-27 PROCEDURE — 85025 COMPLETE CBC W/AUTO DIFF WBC: CPT | Performed by: EMERGENCY MEDICINE

## 2025-02-27 PROCEDURE — 99285 EMERGENCY DEPT VISIT HI MDM: CPT | Mod: 25

## 2025-02-27 PROCEDURE — 80048 BASIC METABOLIC PNL TOTAL CA: CPT | Performed by: EMERGENCY MEDICINE

## 2025-02-27 PROCEDURE — 250N000009 HC RX 250: Performed by: EMERGENCY MEDICINE

## 2025-02-27 PROCEDURE — 97530 THERAPEUTIC ACTIVITIES: CPT | Mod: GP | Performed by: PHYSICAL THERAPIST

## 2025-02-27 PROCEDURE — 70450 CT HEAD/BRAIN W/O DYE: CPT | Mod: XS

## 2025-02-27 PROCEDURE — 87637 SARSCOV2&INF A&B&RSV AMP PRB: CPT | Performed by: EMERGENCY MEDICINE

## 2025-02-27 PROCEDURE — 70496 CT ANGIOGRAPHY HEAD: CPT

## 2025-02-27 PROCEDURE — 84703 CHORIONIC GONADOTROPIN ASSAY: CPT | Performed by: EMERGENCY MEDICINE

## 2025-02-27 RX ORDER — IOPAMIDOL 755 MG/ML
67 INJECTION, SOLUTION INTRAVASCULAR ONCE
Status: COMPLETED | OUTPATIENT
Start: 2025-02-27 | End: 2025-02-27

## 2025-02-27 RX ORDER — KETOROLAC TROMETHAMINE 15 MG/ML
15 INJECTION, SOLUTION INTRAMUSCULAR; INTRAVENOUS ONCE
Status: COMPLETED | OUTPATIENT
Start: 2025-02-27 | End: 2025-02-27

## 2025-02-27 RX ORDER — CYCLOBENZAPRINE HCL 10 MG
10 TABLET ORAL 3 TIMES DAILY PRN
Qty: 12 TABLET | Refills: 0 | Status: SHIPPED | OUTPATIENT
Start: 2025-02-27 | End: 2025-03-05

## 2025-02-27 RX ADMIN — IOPAMIDOL 67 ML: 755 INJECTION, SOLUTION INTRAVENOUS at 11:27

## 2025-02-27 RX ADMIN — KETOROLAC TROMETHAMINE 15 MG: 15 INJECTION, SOLUTION INTRAMUSCULAR; INTRAVENOUS at 12:39

## 2025-02-27 RX ADMIN — SODIUM CHLORIDE 90 ML: 9 INJECTION, SOLUTION INTRAVENOUS at 11:27

## 2025-02-27 ASSESSMENT — ACTIVITIES OF DAILY LIVING (ADL)
ADLS_ACUITY_SCORE: 41

## 2025-02-27 ASSESSMENT — COLUMBIA-SUICIDE SEVERITY RATING SCALE - C-SSRS
6. HAVE YOU EVER DONE ANYTHING, STARTED TO DO ANYTHING, OR PREPARED TO DO ANYTHING TO END YOUR LIFE?: NO
1. IN THE PAST MONTH, HAVE YOU WISHED YOU WERE DEAD OR WISHED YOU COULD GO TO SLEEP AND NOT WAKE UP?: NO
2. HAVE YOU ACTUALLY HAD ANY THOUGHTS OF KILLING YOURSELF IN THE PAST MONTH?: NO

## 2025-02-27 NOTE — PATIENT INSTRUCTIONS
Recommend you go to the ER today to get imaging to rule out vertebral artery dissection on the right. Symptoms you reported to me today were:   New worst neck pain since waking up yesterday 9/10 pain  Migraine/ very bad headache yesterday with extreme light sensitivity.   (Otherwise no change to vision, correct?)  Neck pain is not really changed by position (maybe a little better if you lie down, but then gets worse again)  Tried using pain patches over it and that did not touch the pain      In clinic today:  light headed with returning to neutral after turning head to the Right and back,  worse and instant headache turning head to the Left and back

## 2025-02-27 NOTE — ED TRIAGE NOTES
Pt complains of severe headache and neck pain that started last night.  Was old to come to Ed by primary MD to rule out vertebral artery dissection.     Triage Assessment (Adult)       Row Name 02/27/25 0947          Triage Assessment    Airway WDL WDL        Respiratory WDL    Respiratory WDL WDL        Skin Circulation/Temperature WDL    Skin Circulation/Temperature WDL WDL        Cardiac WDL    Cardiac WDL WDL        Cognitive/Neuro/Behavioral WDL    Cognitive/Neuro/Behavioral WDL WDL

## 2025-02-27 NOTE — ED PROVIDER NOTES
Emergency Department Note      History of Present Illness     Chief Complaint   Neck Pain and Headache      HPI   Maria Alejandra Betts is a 25 year old female with a history of migraine, generalized anxiety disorder and borderline personality disorder who presents for evaluation of a headache and right side back of the neck pain. She states that she started experiencing mild headache and neck pain on Tuesday evening however she was able to sleep through the pain. She woke up yesterday morning earlier than her usual time due to right sided neck pain and head ache. She denies any fall or trauma/ injury to the head or neck. Her pain worsens on side to side neck movement as well as on flexion of the neck. She also reports pain behind her right eye as well as pain to the right arm. She denies double or blurry vision. She reports a TIA and right sided minor deficits from that. She states that she experienced flu symptoms 2 weeks before and tested positive on 02/16. She has a residual cough from the flu and states she has been coughing pretty hard. So she was sent here with the concern of a vertebral artery dissection. She denies any fever, cough, cold, sore throat or runny nose symptoms now.  She uses nicotine vape daily. She denies any concern for pregnancy. She has been managing her pain with heating pad and tiger balm at home, did not take Tylenol. She deneis any past medical history of stroke, type 2 diabetes mellitus and hypertension.     Independent Historian   None    Review of External Notes   none    Past Medical History     Medical History and Problem List   Seasonal allergies  Migraine   Borderline personality syndrome   Generalized anxiety disorder   Major depressive disorder     Medications   Albuterol inhaler   Cyclobenzaprine   Ibuprofen   Methylprednisolone   Prochlorperazine  Propranolol   Rizatriptan   Sumatriptan    Physical Exam     Patient Vitals for the past 24 hrs:   BP Temp Temp src Pulse Resp SpO2    02/27/25 0946 (!) 134/95 96.9  F (36.1  C) Temporal 57 18 100 %     Physical Exam  General:  Sitting on bed.  HENT:  No obvious trauma to head  Right Ear:  External ear normal.   Left Ear:  External ear normal.   Nose:  Nose normal.   Eyes:  Conjunctivae and EOM are normal. Pupils are equal, round, and reactive.   Neck: Normal range of motion. Neck supple. No tracheal deviation present.   CV:  Normal heart sounds. No murmur heard.  Pulm/Chest: Effort normal and breath sounds normal.   Abd: Soft. No distension. There is no tenderness. There is no rigidity, no rebound and no guarding.   M/S: Normal range of motion.   Neuro: Awake and alert. CN II-XII Grossly intact, no pronator drift, normal finger-nose-finger, visual fields intact by confrontation. Muscle strength is +5 proximal and distal in the bilateral upper and lower extremities. No dysarthria. Normal palm up, palm down.  No meningeal signs.  Skin: Skin is warm and dry. No rash noted. Not diaphoretic.   Psych: Normal mood and affect. Behavior is normal.    Diagnostics     Lab Results   Labs Ordered and Resulted from Time of ED Arrival to Time of ED Departure   INFLUENZA A/B, RSV AND SARS-COV2 PCR - Normal       Result Value    Influenza A PCR Negative      Influenza B PCR Negative      RSV PCR Negative      SARS CoV2 PCR Negative     BASIC METABOLIC PANEL - Normal    Sodium 138      Potassium 3.9      Chloride 104      Carbon Dioxide (CO2) 26      Anion Gap 8      Urea Nitrogen 7.0      Creatinine 0.80      GFR Estimate >90      Calcium 9.2      Glucose 84     HCG QUALITATIVE PREGNANCY - Normal    hCG Serum Qualitative Negative     CBC WITH PLATELETS AND DIFFERENTIAL    WBC Count 5.8      RBC Count 4.83      Hemoglobin 13.1      Hematocrit 40.7      MCV 84      MCH 27.1      MCHC 32.2      RDW 13.4      Platelet Count 329      % Neutrophils 63      % Lymphocytes 27      % Monocytes 7      % Eosinophils 3      % Basophils 1      % Immature Granulocytes 0       NRBCs per 100 WBC 0      Absolute Neutrophils 3.6      Absolute Lymphocytes 1.5      Absolute Monocytes 0.4      Absolute Eosinophils 0.2      Absolute Basophils 0.0      Absolute Immature Granulocytes 0.0      Absolute NRBCs 0.0         Imaging   CTA Head Neck with Contrast   Preliminary Result   IMPRESSION:    HEAD CT:   1.  No acute intracranial process.      HEAD CTA:    1.  Normal CTA [place].      NECK CTA:   1.  Normal neck CTA.            Head CT w/o contrast   Preliminary Result   IMPRESSION:    HEAD CT:   1.  No acute intracranial process.      HEAD CTA:    1.  Normal CTA [place].      NECK CTA:   1.  Normal neck CTA.              Independent Interpretation   None    ED Course      Medications Administered   Medications   ketorolac (TORADOL) injection 15 mg (has no administration in time range)   iopamidol (ISOVUE-370) solution 67 mL (67 mLs Intravenous $Given 2/27/25 1127)   Saline Flush - CT (90 mLs Intravenous $Given 2/27/25 1127)       Procedures   Procedures     Discussion of Management   None    ED Course   ED Course as of 02/27/25 1215   u Feb 27, 2025   0956 I obtained the history and examined the patient as above.    1208 I rechecked and updated the patient.     1212 The patient was examined here in the Emergency Department by myself, findings above.  I discussed the plan of treatment with the patient and she is agreeable to this. I answered all questions.   Patient discharged home, status improved, with instructions regarding supportive care, medications, and reasons to return as well as the importance of close follow-up was reviewed.         Additional Documentation  None    Medical Decision Making / Diagnosis     CMS Diagnoses: None    MIPS       None    MDM   Maria Alejandra Betts is a very pleasant 25 year old female who presents with concern of right-sided neck pain.  The patient had influenza a few weeks ago but that is better.  She had been coughing during that time.  She  reports pain is more to the right side of her neck.  She had a call with her PCP who recommended she come to the ED to be assessed for vertebral artery dissection.  The patient does not have any focal neurologic deficit.  No loss of vision or double vision.  Reviewed the risk and benefit of performing advanced imaging to definitively assess for this and the patient strongly desired this.  Labs are unremarkable.  I performed a viral swab which shows no signs of COVID.  No fever or leukocytosis to suggest meningitis nor does she have any evidence of meningitis on clinical exam.  I do not believe LP is necessary and she agrees after reviewing this with her.  This CT was performed and fortunately CT angiogram is negative for vertebral artery dissection or other acute process or intracranial hemorrhage.  I suspect her pain is musculoskeletal from having had influenza and coughing significantly recently.  She was provided Toradol and Flexeril prescribed at bedtime as needed for any additional muscle spasm.  She agrees.    The treatment plan was discussed with the patient and they expressed understanding of this plan and consented to the plan.  In addition, the patient will return to the emergency department if their symptoms persist, worsen, if new symptoms arise or if there is any concern as other pathology may be present that is not evident at this time. They also understand the importance of close follow up in the clinic and if unable to do so will return to the emergency department for a reevaluation. All questions were answered.    Disposition   The patient was discharged.     Diagnosis     ICD-10-CM    1. Nonintractable headache, unspecified chronicity pattern, unspecified headache type  R51.9       2. Neck pain  M54.2            Discharge Medications   New Prescriptions    CYCLOBENZAPRINE (FLEXERIL) 10 MG TABLET    Take 1 tablet (10 mg) by mouth 3 times daily as needed for muscle spasms.         Scribe  Disclosure:  I, Romulo Macdonald, am serving as a scribe at 10:33 AM on 2/27/2025 to document services personally performed by Heraclio Yancey DO based on my observations and the provider's statements to me.        Heraclio Yancey DO  02/27/25 1915

## 2025-03-18 ENCOUNTER — THERAPY VISIT (OUTPATIENT)
Dept: PHYSICAL THERAPY | Facility: CLINIC | Age: 25
End: 2025-03-18
Attending: INTERNAL MEDICINE
Payer: COMMERCIAL

## 2025-03-18 DIAGNOSIS — M35.7 HYPERMOBILITY SYNDROME: Primary | ICD-10-CM

## 2025-03-18 PROCEDURE — 97110 THERAPEUTIC EXERCISES: CPT | Mod: GP | Performed by: PHYSICAL THERAPIST

## 2025-03-18 NOTE — PATIENT INSTRUCTIONS
Dr Magdi Kelly  They do need a 60mn visit for these evaluations, so I recommend they call our clinic directly to get this set up (as they won't be able to schedule for 60mn on mychart). My clinic number is [phone].   [place] [address] [facility] [address], [place] [address]

## 2025-04-02 ENCOUNTER — OFFICE VISIT (OUTPATIENT)
Dept: OTHER | Facility: CLINIC | Age: 25
End: 2025-04-02
Attending: INTERNAL MEDICINE
Payer: COMMERCIAL

## 2025-04-02 VITALS
WEIGHT: 151 LBS | BODY MASS INDEX: 22.3 KG/M2 | DIASTOLIC BLOOD PRESSURE: 77 MMHG | SYSTOLIC BLOOD PRESSURE: 111 MMHG | HEART RATE: 77 BPM | OXYGEN SATURATION: 79 %

## 2025-04-02 DIAGNOSIS — G54.0 NEUROGENIC THORACIC OUTLET SYNDROME: Primary | ICD-10-CM

## 2025-04-02 PROCEDURE — G0463 HOSPITAL OUTPT CLINIC VISIT: HCPCS | Performed by: INTERNAL MEDICINE

## 2025-04-02 PROCEDURE — 3078F DIAST BP <80 MM HG: CPT | Performed by: INTERNAL MEDICINE

## 2025-04-02 PROCEDURE — G2211 COMPLEX E/M VISIT ADD ON: HCPCS | Performed by: INTERNAL MEDICINE

## 2025-04-02 PROCEDURE — 3074F SYST BP LT 130 MM HG: CPT | Performed by: INTERNAL MEDICINE

## 2025-04-02 PROCEDURE — 99214 OFFICE O/P EST MOD 30 MIN: CPT | Performed by: INTERNAL MEDICINE

## 2025-04-02 NOTE — PROGRESS NOTES
[facility]        Patient is here for a  follow up.    Pt is currently taking no meds that would impact our treatment plan.    /77 (BP Location: Right arm, Patient Position: Chair, Cuff Size: Adult Regular)   Pulse 77   Wt 151 lb (68.5 kg)   SpO2 (!) 79%   BMI 22.30 kg/m      The provider has been notified that the patient has no concerns.     Questions patient would like addressed today are: N/A.    Refills are needed: N/A    Has homecare services and agency name:  Raya Jimenez MA
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
## 2025-04-07 ENCOUNTER — TELEPHONE (OUTPATIENT)
Dept: OTHER | Facility: CLINIC | Age: 25
End: 2025-04-07
Payer: COMMERCIAL

## 2025-04-07 NOTE — TELEPHONE ENCOUNTER
Routing to scheduling to coordinate the following:    NEW VASCULAR PATIENT consult with Dr. Sunshine  Please schedule this at next available    Appt note:  Referral from Dr Blunt for FLOR GALANN, RN    [facility]  Office: [phone]  Fax: [phone]

## 2025-04-09 ENCOUNTER — TELEPHONE (OUTPATIENT)
Dept: FAMILY MEDICINE | Facility: CLINIC | Age: 25
End: 2025-04-09

## 2025-04-09 ENCOUNTER — OFFICE VISIT (OUTPATIENT)
Dept: FAMILY MEDICINE | Facility: CLINIC | Age: 25
End: 2025-04-09
Payer: COMMERCIAL

## 2025-04-09 VITALS
WEIGHT: 149.6 LBS | HEART RATE: 82 BPM | RESPIRATION RATE: 18 BRPM | DIASTOLIC BLOOD PRESSURE: 87 MMHG | SYSTOLIC BLOOD PRESSURE: 122 MMHG | OXYGEN SATURATION: 100 % | BODY MASS INDEX: 22.16 KG/M2 | TEMPERATURE: 98.2 F | HEIGHT: 69 IN

## 2025-04-09 DIAGNOSIS — Z12.4 CERVICAL CANCER SCREENING: ICD-10-CM

## 2025-04-09 DIAGNOSIS — Z00.00 ROUTINE GENERAL MEDICAL EXAMINATION AT A HEALTH CARE FACILITY: Primary | ICD-10-CM

## 2025-04-09 DIAGNOSIS — G90.A POTS (POSTURAL ORTHOSTATIC TACHYCARDIA SYNDROME): ICD-10-CM

## 2025-04-09 DIAGNOSIS — G54.0 NEUROGENIC THORACIC OUTLET SYNDROME: ICD-10-CM

## 2025-04-09 DIAGNOSIS — N92.1 MENORRHAGIA WITH IRREGULAR CYCLE: ICD-10-CM

## 2025-04-09 DIAGNOSIS — G43.909 MIGRAINE WITHOUT STATUS MIGRAINOSUS, NOT INTRACTABLE, UNSPECIFIED MIGRAINE TYPE: ICD-10-CM

## 2025-04-09 DIAGNOSIS — M35.7 HYPERMOBILITY SYNDROME: Primary | ICD-10-CM

## 2025-04-09 DIAGNOSIS — G43.109 MIGRAINE WITH AURA AND WITHOUT STATUS MIGRAINOSUS, NOT INTRACTABLE: ICD-10-CM

## 2025-04-09 PROCEDURE — 1125F AMNT PAIN NOTED PAIN PRSNT: CPT | Performed by: FAMILY MEDICINE

## 2025-04-09 PROCEDURE — G0124 SCREEN C/V THIN LAYER BY MD: HCPCS | Performed by: PATHOLOGY

## 2025-04-09 PROCEDURE — 87624 HPV HI-RISK TYP POOLED RSLT: CPT | Performed by: FAMILY MEDICINE

## 2025-04-09 PROCEDURE — 90480 ADMN SARSCOV2 VAC 1/ONLY CMP: CPT | Performed by: FAMILY MEDICINE

## 2025-04-09 PROCEDURE — 3074F SYST BP LT 130 MM HG: CPT | Performed by: FAMILY MEDICINE

## 2025-04-09 PROCEDURE — 3079F DIAST BP 80-89 MM HG: CPT | Performed by: FAMILY MEDICINE

## 2025-04-09 PROCEDURE — 99395 PREV VISIT EST AGE 18-39: CPT | Mod: 25 | Performed by: FAMILY MEDICINE

## 2025-04-09 PROCEDURE — 99214 OFFICE O/P EST MOD 30 MIN: CPT | Mod: 25 | Performed by: FAMILY MEDICINE

## 2025-04-09 PROCEDURE — G0145 SCR C/V CYTO,THINLAYER,RESCR: HCPCS | Performed by: FAMILY MEDICINE

## 2025-04-09 PROCEDURE — 91320 SARSCV2 VAC 30MCG TRS-SUC IM: CPT | Performed by: FAMILY MEDICINE

## 2025-04-09 RX ORDER — SUMATRIPTAN 50 MG/1
50-100 TABLET, FILM COATED ORAL
Qty: 18 TABLET | Refills: 3 | Status: SHIPPED | OUTPATIENT
Start: 2025-04-09

## 2025-04-09 RX ORDER — ACETAMINOPHEN AND CODEINE PHOSPHATE 120; 12 MG/5ML; MG/5ML
0.35 SOLUTION ORAL DAILY
Qty: 90 TABLET | Refills: 3 | Status: SHIPPED | OUTPATIENT
Start: 2025-04-09

## 2025-04-09 RX ORDER — PROPRANOLOL HYDROCHLORIDE 40 MG/1
40 TABLET ORAL 3 TIMES DAILY
Qty: 90 TABLET | Refills: 0 | Status: SHIPPED | OUTPATIENT
Start: 2025-04-09

## 2025-04-09 RX ORDER — PROCHLORPERAZINE MALEATE 10 MG
10 TABLET ORAL EVERY 8 HOURS PRN
Qty: 20 TABLET | Refills: 3 | Status: SHIPPED | OUTPATIENT
Start: 2025-04-09

## 2025-04-09 SDOH — HEALTH STABILITY: PHYSICAL HEALTH: ON AVERAGE, HOW MANY DAYS PER WEEK DO YOU ENGAGE IN MODERATE TO STRENUOUS EXERCISE (LIKE A BRISK WALK)?: 3 DAYS

## 2025-04-09 SDOH — HEALTH STABILITY: PHYSICAL HEALTH: ON AVERAGE, HOW MANY MINUTES DO YOU ENGAGE IN EXERCISE AT THIS LEVEL?: PATIENT DECLINED

## 2025-04-09 ASSESSMENT — PATIENT HEALTH QUESTIONNAIRE - PHQ9
SUM OF ALL RESPONSES TO PHQ QUESTIONS 1-9: 17
10. IF YOU CHECKED OFF ANY PROBLEMS, HOW DIFFICULT HAVE THESE PROBLEMS MADE IT FOR YOU TO DO YOUR WORK, TAKE CARE OF THINGS AT HOME, OR GET ALONG WITH OTHER PEOPLE: VERY DIFFICULT
SUM OF ALL RESPONSES TO PHQ QUESTIONS 1-9: 17

## 2025-04-09 ASSESSMENT — ANXIETY QUESTIONNAIRES
8. IF YOU CHECKED OFF ANY PROBLEMS, HOW DIFFICULT HAVE THESE MADE IT FOR YOU TO DO YOUR WORK, TAKE CARE OF THINGS AT HOME, OR GET ALONG WITH OTHER PEOPLE?: EXTREMELY DIFFICULT
GAD7 TOTAL SCORE: 15
4. TROUBLE RELAXING: MORE THAN HALF THE DAYS
2. NOT BEING ABLE TO STOP OR CONTROL WORRYING: NEARLY EVERY DAY
5. BEING SO RESTLESS THAT IT IS HARD TO SIT STILL: SEVERAL DAYS
7. FEELING AFRAID AS IF SOMETHING AWFUL MIGHT HAPPEN: SEVERAL DAYS
IF YOU CHECKED OFF ANY PROBLEMS ON THIS QUESTIONNAIRE, HOW DIFFICULT HAVE THESE PROBLEMS MADE IT FOR YOU TO DO YOUR WORK, TAKE CARE OF THINGS AT HOME, OR GET ALONG WITH OTHER PEOPLE: EXTREMELY DIFFICULT
GAD7 TOTAL SCORE: 15
GAD7 TOTAL SCORE: 15
7. FEELING AFRAID AS IF SOMETHING AWFUL MIGHT HAPPEN: SEVERAL DAYS
6. BECOMING EASILY ANNOYED OR IRRITABLE: MORE THAN HALF THE DAYS
1. FEELING NERVOUS, ANXIOUS, OR ON EDGE: NEARLY EVERY DAY
3. WORRYING TOO MUCH ABOUT DIFFERENT THINGS: NEARLY EVERY DAY

## 2025-04-09 ASSESSMENT — PAIN SCALES - GENERAL: PAINLEVEL_OUTOF10: MODERATE PAIN (5)

## 2025-04-09 ASSESSMENT — SOCIAL DETERMINANTS OF HEALTH (SDOH): HOW OFTEN DO YOU GET TOGETHER WITH FRIENDS OR RELATIVES?: ONCE A WEEK

## 2025-04-09 NOTE — LETTER
April 9, 2025      Maria Alejandra M Alpesh  [address]  [place] [facility] [place] [address]        To Whom It May Concern,     This patient has recently been diagnosed with a few chronic conditions that affect their ability to stand for prolonged periods of time and limits their ability to use upper arms with weight lifting.  They would benefit from limited heavy lifting of no more than 5#.  Repetitive actions also cause flares.  They would benefit from limited repetitive actions/movements.          Sincerely,        Kenzie Walker MD    Electronically signed

## 2025-04-09 NOTE — PROGRESS NOTES
Preventive Care Visit  [facility]  Kenzie Walker MD, Family Medicine  Apr 9, 2025  {Provider  Link to [facility] :939776}    {PROVIDER CHARTING PREFERENCE:551309}    Kiesha Bess is a 25 year old, presenting for the following:  Physical        4/9/2025     1:24 PM   Additional Questions   Roomed by Marjan MCDANIEL          HPI      Still getting periods with the micronor - lots of mood lability  Has been on this since June last year heavy clots passed  Periods have not improved      {MA/LPN/RN Pre-Provider Visit Orders- hCG/UA/Strep (Optional):516904}  {SUPERLIST (Optional):173891}  {additonal problems for provider to add (Optional):139794}  Advance Care Planning  Patient does not have a Health Care Directive: Discussed advance care planning with patient; information given to patient to review.      4/9/2025   General Health   How would you rate your overall physical health? (!) FAIR   Feel stress (tense, anxious, or unable to sleep) Rather much   (!) STRESS CONCERN      4/9/2025   Nutrition   Three or more servings of calcium each day? (!) NO   Diet: Vegetarian/vegan    Breakfast skipped   How many servings of fruit and vegetables per day? (!) 0-1   How many sweetened beverages each day? (!) 2       Multiple values from one day are sorted in reverse-chronological order         4/9/2025   Exercise   Days per week of moderate/strenous exercise 3 days   Average minutes spent exercising at this level Patient declined         4/9/2025   Social Factors   Frequency of gathering with friends or relatives Once a week   Worry food won't last until get money to buy more Yes   Food not last or not have enough money for food? Yes   Do you have housing? (Housing is defined as stable permanent housing and does not include staying ouside in a car, in a tent, in an abandoned building, in an overnight shelter, or couch-surfing.) Yes   Are you worried about losing your housing? No   Lack of  transportation? Yes   Unable to get utilities (heat,electricity)? No   (!) FOOD SECURITY CONCERN PRESENT (!) TRANSPORTATION CONCERN PRESENT      4/9/2025   Dental   Dentist two times every year? (!) NO           6/10/2024   TB Screening   Were you born outside of the [place]? No         Today's PHQ-9 Score:       4/9/2025    12:55 PM   PHQ-9 SCORE   PHQ-9 Total Score MyChart 17 (Moderately severe depression)   PHQ-9 Total Score 17        Patient-reported         4/9/2025   Substance Use   If I could quit smoking, I would Completely agree   I want to quit somking, worry about health affects Somewhat agree   Willing to make a plan to quit smoking Neutral   Willing to cut down before quitting Completely disagree   Alcohol more than 3/day or more than 7/wk No   Do you use any other substances recreationally? (!) CANNABIS PRODUCTS     Social History     Tobacco Use    Smoking status: Every Day     Types: Vaping Device    Smokeless tobacco: Never   Vaping Use    Vaping status: Every Day    Substances: Nicotine    Devices: Disposable, Pre-filled or refillable cartridge   Substance Use Topics    Alcohol use: Yes     Comment: occ    Drug use: Yes     Types: Marijuana     {Provider  If there are gaps in the social history shown above, please follow the link to update and then refresh the note Link to Social and Substance History :341143}     {Mammogram Decision Support (Optional):748466}        4/9/2025   STI Screening   New sexual partner(s) since last STI/HIV test? No     History of abnormal Pap smear: { :465240}        7/12/2021     9:50 AM   PAP / HPV   PAP Negative for Intraepithelial Lesion or Malignancy (NILM)            4/9/2025   Contraception/Family Planning   Questions about contraception or family planning (!) YES ***     {Provider  REQUIRED FOR AWV Use the storyboard to review patient history, after sections have been marked as reviewed, refresh note to capture documentation:193870}   Reviewed and updated as needed  "this visit by Provider                    {HISTORY OPTIONS (Optional):476599}    {ROS Picklists (Optional):771520}     Objective    Exam  There were no vitals taken for this visit.   Estimated body mass index is 22.3 kg/m  as calculated from the following:    Height as of 12/14/24: 1.753 m (5' 9\").    Weight as of 4/2/25: 68.5 kg (151 lb).    Physical Exam  {Exam Choices (Optional):933336}        Signed Electronically by: Kenzie Walker MD  {Email feedback regarding this note to [email or web address]   :772563}  Answers submitted by the patient for this visit:  Patient Health Questionnaire (Submitted on 4/9/2025)  If you checked off any problems, how difficult have these problems made it for you to do your work, take care of things at home, or get along with other people?: Very difficult  PHQ9 TOTAL SCORE: 17  Patient Health Questionnaire (G7) (Submitted on 4/9/2025)  BRUCE 7 TOTAL SCORE: 15    " "following:    Height as of this encounter: 1.74 m (5' 8.5\").    Weight as of this encounter: 67.9 kg (149 lb 9.6 oz).    Physical Exam  GENERAL: alert and no distress  EYES: Eyes grossly normal to inspection, PERRL and conjunctivae and sclerae normal  HENT: ear canals and TM's normal, nose and mouth without ulcers or lesions  NECK: no adenopathy, no asymmetry, masses, or scars  RESP: lungs clear to auscultation - no rales, rhonchi or wheezes  CV: regular rate and rhythm, normal S1 S2, no S3 or S4, no murmur, click or rub, no peripheral edema  ABDOMEN: soft, nontender, no hepatosplenomegaly, no masses and bowel sounds normal  MS: no gross musculoskeletal defects noted, no edema  SKIN: no suspicious lesions or rashes  NEURO: Normal strength and tone, mentation intact and speech normal  PSYCH: mentation appears normal, affect normal/bright        Signed Electronically by: Kenzie Walker MD    Answers submitted by the patient for this visit:  Patient Health Questionnaire (Submitted on 4/9/2025)  If you checked off any problems, how difficult have these problems made it for you to do your work, take care of things at home, or get along with other people?: Very difficult  PHQ9 TOTAL SCORE: 17  Patient Health Questionnaire (G7) (Submitted on 4/9/2025)  BRUCE 7 TOTAL SCORE: 15    "

## 2025-04-09 NOTE — TELEPHONE ENCOUNTER
----- Message from Carmen Floyd sent at 2025  9:02 AM CDT -----  Regarding: Order request  Dr. Walker,   I have been working with Maria Alejandra in physical therapy and we were hoping that you'd be able to assist in placing some orders for her for the followin) DME order for sacroiliac belt - to assist with pelvic stability and pain, especially helpful for work   2) Orthotics order for right shoulder brace - She continues to experience a lot of right shoulder pain and instability despite manual work/strengthening - think this could help be a tool for her to get through her work day.     Please message me with any questions or concerns    Thank you!   HEAVENLY Hess

## 2025-04-09 NOTE — TELEPHONE ENCOUNTER
Please let pt know that her PT reached out to me for additional braces and I placed orders for them - OK to mail to her or have her   Will place  in tower  Thanks  SN

## 2025-04-09 NOTE — TELEPHONE ENCOUNTER
Spoke w/ patient.   Relayed message below.   faxed medical supplies order to [facility] [phone].     Li CORRIGAN

## 2025-04-13 DIAGNOSIS — N92.1 MENORRHAGIA WITH IRREGULAR CYCLE: ICD-10-CM

## 2025-04-14 ENCOUNTER — OFFICE VISIT (OUTPATIENT)
Dept: OTHER | Facility: CLINIC | Age: 25
End: 2025-04-14
Attending: SURGERY
Payer: COMMERCIAL

## 2025-04-14 VITALS — SYSTOLIC BLOOD PRESSURE: 117 MMHG | DIASTOLIC BLOOD PRESSURE: 76 MMHG | HEART RATE: 61 BPM

## 2025-04-14 DIAGNOSIS — G54.0 NEUROGENIC THORACIC OUTLET SYNDROME OF RIGHT BRACHIAL PLEXUS: Primary | ICD-10-CM

## 2025-04-14 DIAGNOSIS — G54.0 THORACIC OUTLET SYNDROME OF RIGHT THORACIC OUTLET: Primary | ICD-10-CM

## 2025-04-14 PROCEDURE — G0463 HOSPITAL OUTPT CLINIC VISIT: HCPCS | Performed by: SURGERY

## 2025-04-14 PROCEDURE — 3078F DIAST BP <80 MM HG: CPT | Performed by: SURGERY

## 2025-04-14 PROCEDURE — 99204 OFFICE O/P NEW MOD 45 MIN: CPT | Performed by: SURGERY

## 2025-04-14 PROCEDURE — 3074F SYST BP LT 130 MM HG: CPT | Performed by: SURGERY

## 2025-04-14 RX ORDER — NORETHINDRONE 0.35 MG/1
0.35 TABLET ORAL
Qty: 84 TABLET | Refills: 2 | OUTPATIENT
Start: 2025-04-14
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
## 2025-04-14 NOTE — PATIENT INSTRUCTIONS
Thank you for allowing [facility] to provide your medical care.      Please see below for the follow up instructions pertaining to your medical [facility].    Follow up plan:   1) Referral to [facility] placed.  2) Schedule follow up with Dr. Sunshine one to two weeks after Nestor visit.        Our office will send you a letter by mail, closer to your follow up time frame, with a reminder to call to schedule appointment(s).    Please call our office with any concerns or questions [phone].

## 2025-04-14 NOTE — PROGRESS NOTES
[facility]        Patient is here for a consult to discuss TOS.    Pt is currently taking no meds that would impact our treatment plan.    /76 (BP Location: Left arm, Patient Position: Chair, Cuff Size: Adult Regular)   Pulse 61   LMP 03/31/2025 (Within Days)     The provider has been notified that the patient has no concerns.     Questions patient would like addressed today are: N/A.    Refills are needed: N/A    Has homecare services and agency name:  Raya Jimenez MA

## 2025-04-17 LAB
BKR LAB AP GYN ADEQUACY: ABNORMAL
BKR LAB AP GYN INTERPRETATION: ABNORMAL
BKR LAB AP HPV REFLEX: ABNORMAL
BKR LAB AP PREVIOUS ABNORMAL: ABNORMAL
PATH REPORT.COMMENTS IMP SPEC: ABNORMAL
PATH REPORT.COMMENTS IMP SPEC: ABNORMAL
PATH REPORT.RELEVANT HX SPEC: ABNORMAL

## 2025-04-21 ENCOUNTER — TELEPHONE (OUTPATIENT)
Dept: FAMILY MEDICINE | Facility: CLINIC | Age: 25
End: 2025-04-21
Payer: COMMERCIAL

## 2025-04-21 ENCOUNTER — PATIENT OUTREACH (OUTPATIENT)
Dept: FAMILY MEDICINE | Facility: CLINIC | Age: 25
End: 2025-04-21
Payer: COMMERCIAL

## 2025-04-21 PROBLEM — R87.810 ASCUS WITH POSITIVE HIGH RISK HPV CERVICAL: Status: ACTIVE | Noted: 2025-04-09

## 2025-04-21 PROBLEM — R87.610 ASCUS WITH POSITIVE HIGH RISK HPV CERVICAL: Status: ACTIVE | Noted: 2025-04-09

## 2025-04-21 LAB
HPV HR 12 DNA CVX QL NAA+PROBE: POSITIVE
HPV16 DNA CVX QL NAA+PROBE: NEGATIVE
HPV18 DNA CVX QL NAA+PROBE: NEGATIVE
HUMAN PAPILLOMA VIRUS FINAL DIAGNOSIS: ABNORMAL

## 2025-04-21 NOTE — PATIENT INSTRUCTIONS
Patient Education   Preventive Care Advice   This is general advice given by our system to help you stay healthy. However, your care team may have specific advice just for you. Please talk to your care team about your preventive care needs.  Nutrition  Eat 5 or more servings of fruits and vegetables each day.  Try wheat bread, brown rice and whole grain pasta (instead of white bread, rice, and pasta).  Get enough calcium and vitamin D. Check the label on foods and aim for 100% of the RDA (recommended daily allowance).  Lifestyle  Exercise at least 150 minutes each week  (30 minutes a day, 5 days a week).  Do muscle strengthening activities 2 days a week. These help control your weight and prevent disease.  No smoking.  Wear sunscreen to prevent skin cancer.  Have a dental exam and cleaning every 6 months.  Yearly exams  See your health care team every year to talk about:  Any changes in your health.  Any medicines your care team has prescribed.  Preventive care, family planning, and ways to prevent chronic diseases.  Shots (vaccines)   HPV shots (up to age 26), if you've never had them before.  Hepatitis B shots (up to age 59), if you've never had them before.  COVID-19 shot: Get this shot when it's due.  Flu shot: Get a flu shot every year.  Tetanus shot: Get a tetanus shot every 10 years.  Pneumococcal, hepatitis A, and RSV shots: Ask your care team if you need these based on your risk.  Shingles shot (for age 50 and up)  General health tests  Diabetes screening:  Starting at age 35, Get screened for diabetes at least every 3 years.  If you are younger than age 35, ask your care team if you should be screened for diabetes.  Cholesterol test: At age 39, start having a cholesterol test every 5 years, or more often if advised.  Bone density scan (DEXA): At age 50, ask your care team if you should have this scan for osteoporosis (brittle bones).  Hepatitis C: Get tested at least once in your life.  STIs (sexually  transmitted infections)  Before age 24: Ask your care team if you should be screened for STIs.  After age 24: Get screened for STIs if you're at risk. You are at risk for STIs (including HIV) if:  You are sexually active with more than one person.  You don't use condoms every time.  You or a partner was diagnosed with a sexually transmitted infection.  If you are at risk for HIV, ask about PrEP medicine to prevent HIV.  Get tested for HIV at least once in your life, whether you are at risk for HIV or not.  Cancer screening tests  Cervical cancer screening: If you have a cervix, begin getting regular cervical cancer screening tests starting at age 21.  Breast cancer scan (mammogram): If you've ever had breasts, begin having regular mammograms starting at age 40. This is a scan to check for breast cancer.  Colon cancer screening: It is important to start screening for colon cancer at age 45.  Have a colonoscopy test every 10 years (or more often if you're at risk) Or, ask your provider about stool tests like a FIT test every year or Cologuard test every 3 years.  To learn more about your testing options, visit:   .  For help making a decision, visit:   https://bit.ly/vi10908.  Prostate cancer screening test: If you have a prostate, ask your care team if a prostate cancer screening test (PSA) at age 55 is right for you.  Lung cancer screening: If you are a current or former smoker ages 50 to 80, ask your care team if ongoing lung cancer screenings are right for you.  For informational purposes only. Not to replace the advice of your health care provider. Copyright   2023 [facility]. All rights reserved. Clinically reviewed by the [facility]. TeamLIN[employer] 832135 - REV 01/24.  Learning About Depression Screening  What is depression screening?  Depression screening is a way to see if you have depression symptoms. It may be done by a doctor or counselor. It's often part of a routine  "checkup. That's because your mental health is just as important as your physical health.  Depression is a mental health condition that affects how you feel, think, and act. You may:  Have less energy.  Lose interest in your daily activities.  Feel sad and grouchy for a long time.  Depression is very common. It affects people of all ages.  Many things can lead to depression. Some people become depressed after they have a stroke or find out they have a major illness like cancer or heart disease. The death of a loved one or a breakup may lead to depression. It can run in families. Most experts believe that a combination of inherited genes and stressful life events can cause it.  What happens during screening?  You may be asked to fill out a form about your depression symptoms. You and the doctor will discuss your answers. The doctor may ask you more questions to learn more about how you think, act, and feel.  What happens after screening?  If you have symptoms of depression, your doctor will talk to you about your options.  Doctors usually treat depression with medicines or counseling. Often, combining the two works best. Many people don't get help because they think that they'll get over the depression on their own. But people with depression may not get better unless they get treatment.  The cause of depression is not well understood. There may be many factors involved. But if you have depression, it's not your fault.  A serious symptom of depression is thinking about death or suicide. If you or someone you care about talks about this or about feeling hopeless, get help right away.  It's important to know that depression can be treated. Medicine, counseling, and self-care may help.  Where can you learn more?  Go to https://www.healthwise.net/patiented  Enter T185 in the search box to learn more about \"Learning About Depression Screening.\"  Current as of: July 31, 2024  Content Version: 14.4    4689-6580 [employer] " [employer].   Care instructions adapted under license by your healthcare professional. If you have questions about a medical condition or this instruction, always ask your healthcare professional. [facility] [employer] disclaims any warranty or liability for your use of this information.

## 2025-04-21 NOTE — TELEPHONE ENCOUNTER
[facility] Primary Care scheduling-    Please reach out to patient to schedule Colposcopy with one of the providers at the clinic who perform this procedure.    Thank you!  Lynette Nissen RN  Cervical Cancer Resulting RN (CCR-RN)

## 2025-05-05 ENCOUNTER — THERAPY VISIT (OUTPATIENT)
Dept: PHYSICAL THERAPY | Facility: CLINIC | Age: 25
End: 2025-05-05
Attending: INTERNAL MEDICINE

## 2025-05-05 DIAGNOSIS — M35.7 HYPERMOBILITY SYNDROME: Primary | ICD-10-CM

## 2025-05-05 PROCEDURE — 97110 THERAPEUTIC EXERCISES: CPT | Mod: GP | Performed by: PHYSICAL THERAPIST

## 2025-05-05 PROCEDURE — 97140 MANUAL THERAPY 1/> REGIONS: CPT | Mod: GP | Performed by: PHYSICAL THERAPIST

## 2025-05-06 NOTE — PROGRESS NOTES
05/05/25 0500   Appointment Info   Signing clinician's name / credentials Carmen Floyd DPT   Total/Authorized Visits [facility]   Visits Used 8/10   Medical Diagnosis neruogenic thoracic outlet   PT Tx Diagnosis Force production deficit, Hypermobility   Progress Note/Certification   Start of Care Date 01/23/25   Onset of illness/injury or Date of Surgery 01/06/25   Therapy Frequency 1x/2 weeks   Predicted Duration 90 days   Certification date from 04/23/25   Certification date to 07/22/25   GOALS   PT Goals 2;3;4   PT Goal 1   Goal Identifier BIoH Questionnaire   Goal Description Pt will demonstrate a 10% improvement in their BIoH score to indicate improved QOL and function.   Goal Progress 5/5/25: 244/360 = 68%: 1/29/25 - 249/360= 69%   Target Date 07/22/25   PT Goal 2   Goal Identifier Work   Goal Description Pt will be able to lift 30lbs in both hands with no increased pain in neck or R UE during or after in order to fully participate in her work duties.   Goal Progress 5/5 - About the same, some work restrictions in place but trying hard to stick to them. Challenging.   Target Date 07/22/25   PT Goal 3   Goal Identifier Home management   Goal Description Pt will report able to carry the laundry or take out the trash with no  increased pain in neck or R UE.   Goal Progress 5/5 - reports about the same. overdid it and cleaned the house after my trip.   Target Date 07/22/25   PT Goal 4   Goal Identifier HEP   Goal Description Pt will be indep with HEP for upper quater stability and strength as well as whole body stabilization program to prevent recurrance due to hypermobility   Goal Progress 5/5/25 - inconsistent with HEP, using tennis ball to release areas. Ice/heat helpful.   Target Date 07/22/25   Subjective Report   Subjective Report Been doing okay. Arm is bruised pretty bad. Want me to schedule a scalene block, need to do that still. SO i'm in pain when I go in. Need to schedule appt for follow up for  AFO/inserts. Work is going okay. Was with babies, had to pick them up all morning. Otherwise mostly doing . Still in pain but not as bad as with infants. Trying to work on them with my schedule.   Objective Measure 1   Details 244/360 = 68%   Objective Measure [place] Impact of Hypermobility   Therapeutic Procedure/Exercise   Therapeutic Procedures: strength, endurance, ROM, flexibility minutes (15929) 25   Ther Proc 1 Goal review   Ther Proc 1 - Details Reviewed all PT goals as noted above to access progress. Patient making very limited gains in PT currently likely due to inconsistency with HEP. Additionally, she is working on getting her AFOs andrequested orders for custom compression top.   Ther Proc 2 Review of HEP   Ther Proc 2 - Details verbal review of top 3 shoulder/neck exercises to resume. Educ on the importance of consistency with HEP to builld up strength/stability to support her joints.   Ther Proc 3 K tape applicaton   Ther Proc 3 - Details Applied k tape to right shoulder from anterior humeral head to inferior border of scapula. Patient notes significant improvement in sypmtoms/stability. Will teach partner if interested.   Manual Therapy   Manual Therapy: Mobilization, MFR, MLD, friction massage minutes (11830) 20   Manual Therapy 1 Manual techniques   Manual Therapy 1 - Details In supine position, performed 1st rib inferior mobilizations R slightly higher than L with good correction. Then performed shoulder PA mobilization grade 3-4 to improve alignment of humeral head in GH joint. Right side much looser overall and required more mobs to achieve alignment.   Education   Learner/Method Patient   Education Comments above.   Plan   Home program ptrx   Plan for next session Follow up on orthotics appointment   Comments   Comments cert completed   Total Session Time   Timed Code Treatment Minutes 45   Total Treatment Time (sum of timed and untimed services) 45           [facility]  Rehabilitation Services                                                                                   OUTPATIENT PHYSICAL THERAPY    PLAN OF TREATMENT FOR OUTPATIENT REHABILITATION   Patient's Last Name, First Name, Maria Alejandra Jaime YOB: 2000   Provider's Name   [facility]   Medical Record No.  [id number]     Onset Date: 01/06/25  Start of Care Date: 01/23/25     Medical Diagnosis:  neruogenic thoracic outlet      PT Treatment Diagnosis:  Force production deficit, Hypermobility Plan of Treatment  Frequency/Duration: 1x/2 weeks/ 90 days    Certification date from 04/23/25 to 07/22/25         See note for plan of treatment details and functional goals     Carmen Floyd, PT                         I CERTIFY THE NEED FOR THESE SERVICES FURNISHED UNDER        THIS PLAN OF TREATMENT AND WHILE UNDER MY CARE .             Physician Signature               Date    X_____________________________________________________                  Referring Provider:  Niko Blunt    Initial Assessment  See Epic Evaluation- Start of Care Date: 01/23/25

## 2025-05-22 ENCOUNTER — TELEPHONE (OUTPATIENT)
Dept: FAMILY MEDICINE | Facility: CLINIC | Age: 25
End: 2025-05-22

## 2025-06-20 NOTE — PATIENT INSTRUCTIONS
5/5/25   - Try using your squishmallows to place behind your back - this way you can lean back to 1/2 sidelying to increase support and comfort.   - Use a towel roll and place in pillowcase    DC instructions